# Patient Record
Sex: MALE | Race: BLACK OR AFRICAN AMERICAN | NOT HISPANIC OR LATINO | URBAN - METROPOLITAN AREA
[De-identification: names, ages, dates, MRNs, and addresses within clinical notes are randomized per-mention and may not be internally consistent; named-entity substitution may affect disease eponyms.]

---

## 2019-05-08 ENCOUNTER — INPATIENT (INPATIENT)
Facility: HOSPITAL | Age: 54
LOS: 22 days | Discharge: LEFT BEFORE DISCHARGE | End: 2019-05-31
Attending: INTERNAL MEDICINE | Admitting: INTERNAL MEDICINE
Payer: MEDICAID

## 2019-05-08 VITALS
RESPIRATION RATE: 18 BRPM | SYSTOLIC BLOOD PRESSURE: 116 MMHG | DIASTOLIC BLOOD PRESSURE: 50 MMHG | WEIGHT: 225.09 LBS | HEIGHT: 73 IN | HEART RATE: 106 BPM

## 2019-05-08 DIAGNOSIS — D68.4 ACQUIRED COAGULATION FACTOR DEFICIENCY: ICD-10-CM

## 2019-05-08 DIAGNOSIS — D69.59 OTHER SECONDARY THROMBOCYTOPENIA: ICD-10-CM
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
## 2019-05-08 DIAGNOSIS — R09.89 OTHER SPECIFIED SYMPTOMS AND SIGNS INVOLVING THE CIRCULATORY AND RESPIRATORY SYSTEMS: ICD-10-CM

## 2019-05-08 DIAGNOSIS — G31.2 DEGENERATION OF NERVOUS SYSTEM DUE TO ALCOHOL: ICD-10-CM
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
## 2019-05-08 LAB
ABO RH CONFIRMATION: SIGNIFICANT CHANGE UP
ALBUMIN SERPL ELPH-MCNC: 1.9 G/DL — LOW (ref 3.3–5)
ALP SERPL-CCNC: 55 U/L — SIGNIFICANT CHANGE UP (ref 40–120)
ALT FLD-CCNC: 13 U/L — SIGNIFICANT CHANGE UP (ref 12–78)
AMMONIA BLD-MCNC: 36 UMOL/L — HIGH (ref 11–32)
ANION GAP SERPL CALC-SCNC: 18 MMOL/L — HIGH (ref 5–17)
ANISOCYTOSIS BLD QL: SLIGHT — SIGNIFICANT CHANGE UP
APAP SERPL-MCNC: < 2 UG/ML (ref 10–30)
APTT BLD: 31.4 SEC — SIGNIFICANT CHANGE UP (ref 28.5–37)
AST SERPL-CCNC: 77 U/L — HIGH (ref 15–37)
BASOPHILS # BLD AUTO: 0.12 K/UL — SIGNIFICANT CHANGE UP (ref 0–0.2)
BASOPHILS NFR BLD AUTO: 1 % — SIGNIFICANT CHANGE UP (ref 0–2)
BILIRUB SERPL-MCNC: 6.3 MG/DL — HIGH (ref 0.2–1.2)
BLD GP AB SCN SERPL QL: SIGNIFICANT CHANGE UP
BUN SERPL-MCNC: 25 MG/DL — HIGH (ref 7–23)
BURR CELLS BLD QL SMEAR: SIGNIFICANT CHANGE UP
CALCIUM SERPL-MCNC: 8.2 MG/DL — LOW (ref 8.5–10.1)
CHLORIDE SERPL-SCNC: 105 MMOL/L — SIGNIFICANT CHANGE UP (ref 96–108)
CK SERPL-CCNC: 56 U/L — SIGNIFICANT CHANGE UP (ref 26–308)
CO2 SERPL-SCNC: 15 MMOL/L — LOW (ref 22–31)
CREAT SERPL-MCNC: 2.57 MG/DL — HIGH (ref 0.5–1.3)
EOSINOPHIL # BLD AUTO: 0 K/UL — SIGNIFICANT CHANGE UP (ref 0–0.5)
EOSINOPHIL NFR BLD AUTO: 0 % — SIGNIFICANT CHANGE UP (ref 0–6)
ETHANOL SERPL-MCNC: <10 MG/DL — SIGNIFICANT CHANGE UP (ref 0–10)
GLUCOSE SERPL-MCNC: 118 MG/DL — HIGH (ref 70–99)
HCT VFR BLD CALC: 10.1 % — CRITICAL LOW (ref 39–50)
HGB BLD-MCNC: 2.9 G/DL — CRITICAL LOW (ref 13–17)
HYPOCHROMIA BLD QL: SIGNIFICANT CHANGE UP
INR BLD: 1.97 RATIO — HIGH (ref 0.88–1.16)
LACTATE SERPL-SCNC: 8.8 MMOL/L — CRITICAL HIGH (ref 0.7–2)
LIDOCAIN IGE QN: 180 U/L — SIGNIFICANT CHANGE UP (ref 73–393)
LYMPHOCYTES # BLD AUTO: 2.97 K/UL — SIGNIFICANT CHANGE UP (ref 1–3.3)
LYMPHOCYTES # BLD AUTO: 24 % — SIGNIFICANT CHANGE UP (ref 13–44)
MACROCYTES BLD QL: SIGNIFICANT CHANGE UP
MANUAL SMEAR VERIFICATION: SIGNIFICANT CHANGE UP
MCHC RBC-ENTMCNC: 28.7 GM/DL — LOW (ref 32–36)
MCHC RBC-ENTMCNC: 36.3 PG — HIGH (ref 27–34)
MCV RBC AUTO: 126.3 FL — HIGH (ref 80–100)
MICROCYTES BLD QL: SLIGHT — SIGNIFICANT CHANGE UP
MONOCYTES # BLD AUTO: 0.25 K/UL — SIGNIFICANT CHANGE UP (ref 0–0.9)
MONOCYTES NFR BLD AUTO: 2 % — SIGNIFICANT CHANGE UP (ref 2–14)
MYELOCYTES NFR BLD: 1 % — HIGH (ref 0–0)
NEUTROPHILS # BLD AUTO: 8.91 K/UL — HIGH (ref 1.8–7.4)
NEUTROPHILS NFR BLD AUTO: 72 % — SIGNIFICANT CHANGE UP (ref 43–77)
NRBC # BLD: 1 /100 — HIGH (ref 0–0)
NRBC # BLD: SIGNIFICANT CHANGE UP /100 WBCS (ref 0–0)
PLAT MORPH BLD: NORMAL — SIGNIFICANT CHANGE UP
PLATELET # BLD AUTO: 115 K/UL — LOW (ref 150–400)
POLYCHROMASIA BLD QL SMEAR: SLIGHT — SIGNIFICANT CHANGE UP
POTASSIUM SERPL-MCNC: 3.8 MMOL/L — SIGNIFICANT CHANGE UP (ref 3.5–5.3)
POTASSIUM SERPL-SCNC: 3.8 MMOL/L — SIGNIFICANT CHANGE UP (ref 3.5–5.3)
PROT SERPL-MCNC: 7.4 GM/DL — SIGNIFICANT CHANGE UP (ref 6–8.3)
PROTHROM AB SERPL-ACNC: 22.5 SEC — HIGH (ref 10–12.9)
RBC # BLD: 0.8 M/UL — LOW (ref 4.2–5.8)
RBC # FLD: 20.3 % — HIGH (ref 10.3–14.5)
RBC BLD AUTO: ABNORMAL
SALICYLATES SERPL-MCNC: <1.7 MG/DL (ref 2.8–20)
SODIUM SERPL-SCNC: 138 MMOL/L — SIGNIFICANT CHANGE UP (ref 135–145)
TROPONIN I SERPL-MCNC: <.015 NG/ML — SIGNIFICANT CHANGE UP (ref 0.01–0.04)
WBC # BLD: 12.38 K/UL — HIGH (ref 3.8–10.5)
WBC # FLD AUTO: 12.38 K/UL — HIGH (ref 3.8–10.5)

## 2019-05-08 PROCEDURE — 93010 ELECTROCARDIOGRAM REPORT: CPT

## 2019-05-08 PROCEDURE — 71045 X-RAY EXAM CHEST 1 VIEW: CPT | Mod: 26

## 2019-05-08 PROCEDURE — 99291 CRITICAL CARE FIRST HOUR: CPT

## 2019-05-08 RX ORDER — VANCOMYCIN HCL 1 G
1000 VIAL (EA) INTRAVENOUS ONCE
Qty: 0 | Refills: 0 | Status: COMPLETED | OUTPATIENT
Start: 2019-05-08 | End: 2019-05-08

## 2019-05-08 RX ORDER — SODIUM CHLORIDE 9 MG/ML
1000 INJECTION INTRAMUSCULAR; INTRAVENOUS; SUBCUTANEOUS ONCE
Qty: 0 | Refills: 0 | Status: COMPLETED | OUTPATIENT
Start: 2019-05-08 | End: 2019-05-08

## 2019-05-08 RX ORDER — CEFTRIAXONE 500 MG/1
1 INJECTION, POWDER, FOR SOLUTION INTRAMUSCULAR; INTRAVENOUS ONCE
Qty: 0 | Refills: 0 | Status: COMPLETED | OUTPATIENT
Start: 2019-05-08 | End: 2019-05-08

## 2019-05-08 RX ADMIN — Medication 250 MILLIGRAM(S): at 22:14

## 2019-05-08 RX ADMIN — CEFTRIAXONE 100 GRAM(S): 500 INJECTION, POWDER, FOR SOLUTION INTRAMUSCULAR; INTRAVENOUS at 21:54

## 2019-05-08 RX ADMIN — SODIUM CHLORIDE 500 MILLILITER(S): 9 INJECTION INTRAMUSCULAR; INTRAVENOUS; SUBCUTANEOUS at 21:54

## 2019-05-08 RX ADMIN — CEFTRIAXONE 1 GRAM(S): 500 INJECTION, POWDER, FOR SOLUTION INTRAMUSCULAR; INTRAVENOUS at 22:15

## 2019-05-08 NOTE — ED PROVIDER NOTE - CARE PLAN
Principal Discharge DX:	Anemia, unspecified type  Secondary Diagnosis:	Acute renal failure, unspecified acute renal failure type

## 2019-05-08 NOTE — H&P ADULT - ATTENDING COMMENTS
54 year old M HTN and ETOH abuse brought in by wife for generalized weakness and dark urine found to have lactic acidosis with h/h 2.9/10.1 with Tbili 6.3, possibly hemolytic anemia vs nutritional deficiency as  in the setting of ETOH abuse.    GEN - NAD; well appearing; A+O x3; non-toxic appearing, diffusely jaundiced; HEENT: noted to have multiple teeth missing, no palatal petechiae  CARD -s1s2, RRR, no M,G,R; PULM - CTA b/l, symmetric breath sounds; ABD:  +BS, ND, NT, soft, no guarding, no rebound, no masses;   BACK: no CVA tenderness, Normal  spine; EXT: symmetric pulses, 2+ dp, capillary refill < 2 seconds, no clubbing, no cyanosis, no edema   NEURO: alert, CN 2-12 intact, reflexes nl, sensation nl, coordination nl, finger to nose nl, romberg negative, motor 5/5 RUE/LUE/RLE/LLE/EHL/Plantar flexion, no pronator drift, gait nl; slight upper extremity tremor; SKIN: Bruise to right anterior chest and left posterior thigh and calf.    Plan  Neuro: No acute neuro deficits, noted to have slight tremor, concern for possible ETOH withdrawal given history of abuse.  Will start CIWA, with trigger dose ativan.  Monitor for signs of withdrawal  CV: Grossly hemodynamically stable, will check trops, ck and BNP to evaluate for signs of heart failure, possibly high output given anemia  Pulm: No acute issues  GI: Possible gastritis, will start protonix, concern for possible cirrhosis, abdomen slightly distended, soft, possible ascites, will check hepatitis panel.  Noted to have MELD score of 30, however given unknown etiology of bili, could be related to hemolysis rather than hepatic dysfunction.  Follow up CT for acute abnormalities  Renal/Metabolic: RICHAR on ?CKD unknown, last seen by PMD in 9/2018 according to wife. Will trend creatinine closely.  Will obtain nephrology consult in AM.  ID: Afebrile, with lactic acidosis, unclear if sepsis related or related to hypoperfusion 2/2 anemia.  Received ceftriaxone and vancomycin empirically in the ED.  Will continue to monitor, trend lactate after 1st PRBC  Heme: Anemia likely chronic, unclear etiology noted to be macrocytic, with associated protein gap of 5.5.  No schistocytes noted on differential, awaiting for official blood smear, to rule out TTP given anemia, acute renal failure, with mild thrombocytopenia.  Follow up LDH, haptoglobin, iron studies, ferritin.  Heme consult in AM.  No petechiae noted, but bruising noted over chest and posterior lower extremity.  Follow up lower extremity US of LLE.    Endo: Check TSH  Dispo: Admit to ICU for transfusion, fluid management.    Attending Critical Care Time Spent: 60 Minutes 54 year old M HTN and ETOH abuse brought in by wife for generalized weakness and dark urine found to have lactic acidosis with h/h 2.9/10.1 with Tbili 6.3, possibly hemolytic anemia vs nutritional deficiency as  in the setting of ETOH abuse.    GEN - NAD; well appearing; A+O x3; non-toxic appearing, diffusely jaundiced; HEENT: noted to have multiple teeth missing, no palatal petechiae  CARD -s1s2, RRR, no M,G,R; PULM - CTA b/l, symmetric breath sounds; ABD:  +BS, soft distended with fluid wave, no guarding, no rebound, no masses; BACK: no CVA tenderness, Normal  spine; EXT: symmetric pulses, 2+ dp, capillary refill < 2 seconds, no clubbing, no cyanosis, no edema   NEURO: alert, CN 2-12 intact, reflexes nl, sensation nl, coordination nl, finger to nose nl, romberg negative, motor 5/5 RUE/LUE/RLE/LLE/EHL/Plantar flexion, no pronator drift, gait nl; slight upper extremity tremor; SKIN: Bruise to right anterior chest and left posterior thigh and calf.    Plan  Neuro: No acute neuro deficits, noted to have slight tremor, concern for possible ETOH withdrawal given history of abuse.  Will start CIWA, with trigger dose ativan.  Monitor for signs of withdrawal  CV: Grossly hemodynamically stable, will check trops, ck and BNP to evaluate for signs of heart failure, possibly high output given anemia  Pulm: No acute issues  GI: Possible gastritis, will start protonix, concern for possible cirrhosis, abdomen slightly distended, soft, possible ascites, will check hepatitis panel.  Noted to have MELD score of 30, however given unknown etiology of bili, could be related to hemolysis rather than hepatic dysfunction.  Follow up CT for acute abnormalities  Renal/Metabolic: RICHAR on ?CKD unknown, last seen by PMD in 9/2018 according to wife. Will trend creatinine closely.  Will obtain nephrology consult in AM.  ID: Afebrile, with lactic acidosis, unclear if sepsis related or related to hypoperfusion 2/2 anemia.  Received ceftriaxone and vancomycin empirically in the ED.  Will continue to monitor, trend lactate after 1st PRBC  Heme: Anemia likely chronic, unclear etiology noted to be macrocytic, with associated protein gap of 5.5.  No schistocytes noted on differential, awaiting for official blood smear, to rule out TTP given anemia, acute renal failure, with mild thrombocytopenia.  Follow up LDH, haptoglobin, iron studies, ferritin.  Heme consult in AM.  No petechiae noted, but bruising noted over chest and posterior lower extremity.  Follow up lower extremity US of LLE.    Endo: Check TSH  Dispo: Admit to ICU for transfusion, fluid management.    Attending Critical Care Time Spent: 60 Minutes

## 2019-05-08 NOTE — ED ADULT NURSE NOTE - NSIMPLEMENTINTERV_GEN_ALL_ED
Implemented All Universal Safety Interventions:  [place] to call system. Call bell, personal items and telephone within reach. Instruct patient to call for assistance. Room bathroom lighting operational. Non-slip footwear when patient is off stretcher. Physically safe environment: no spills, clutter or unnecessary equipment. Stretcher in lowest position, wheels locked, appropriate side rails in place.

## 2019-05-08 NOTE — ED CLERICAL - BED REQUESTED
08-May-2019 23:56 Crisis worker spoke to pt about placement and transfer time      Kaera Wilkins  08/12/18 [id number]

## 2019-05-08 NOTE — ED ADULT NURSE NOTE - OBJECTIVE STATEMENT
54M aaox3 ambulatory with assistance,  bibems from home activated by wife secondary to generalized weakness, inabuility to ambulate steadily and tremors. Patient with h/o HTN, and alcohol abuse. Notes paleness all over the body, bruises on the rt shoulder, rt chest wall and left thigh, denies any trauma or fall. Last drink was few days ago, denies any h/o alcohol withdrawal. 2 large bore IV access inserted, labss ent pending reslts.

## 2019-05-08 NOTE — H&P ADULT - NSHPREVIEWOFSYSTEMS_GEN_ALL_CORE
REVIEW OF SYSTEMS:    CONSTITUTIONAL:+ fatigue, weakness  EYES: No eye pain, visual disturbances, or discharge  ENMT:  No difficulty hearing, tinnitus, vertigo; No sinus or throat pain  NECK: No pain or stiffness  BREASTS: No pain, masses, or nipple discharge  RESPIRATORY: No cough, wheezing, chills or hemoptysis; No shortness of breath  CARDIOVASCULAR: No chest pain, palpitations, dizziness, or leg swelling  GASTROINTESTINAL: No abdominal or epigastric pain. No nausea, vomiting, or hematemesis; No diarrhea or constipation. No melena or hematochezia.  GENITOURINARY: No dysuria, frequency, hematuria, or incontinence  NEUROLOGICAL: No headaches, memory loss, loss of strength, numbness, or tremors  SKIN: No itching, burning, rashes, or lesions   LYMPH NODES: No enlarged glands  ENDOCRINE: No heat or cold intolerance; No hair loss  MUSCULOSKELETAL: +left lower extremity pain  PSYCHIATRIC: No depression, anxiety, mood swings, or difficulty sleeping  HEME/LYMPH: + easy bruising, + bleeding gums  ALLERGY AND IMMUNOLOGIC: No hives or eczema REVIEW OF SYSTEMS:    CONSTITUTIONAL:+ fatigue, weakness  EYES: No eye pain, visual disturbances, or discharge  ENMT:  No difficulty hearing, tinnitus, vertigo; No sinus or throat pain  NECK: No pain or stiffness  BREASTS: No pain, masses, or nipple discharge  RESPIRATORY: No cough, wheezing, chills or hemoptysis; No shortness of breath  CARDIOVASCULAR: No chest pain, palpitations, dizziness, or leg swelling  GASTROINTESTINAL: No abdominal or epigastric pain. No nausea, vomiting, or hematemesis; No diarrhea or constipation. No melena or hematochezia.  GENITOURINARY: No dysuria, frequency, hematuria, or incontinence  NEUROLOGICAL: No headaches, memory loss, loss of strength, numbness, or tremors  SKIN: + Jaundice; + bruisingNo itching, burning, rashes, or lesions   LYMPH NODES: No enlarged glands  ENDOCRINE: No heat or cold intolerance; No hair loss  MUSCULOSKELETAL: +left lower extremity pain  PSYCHIATRIC: No depression, anxiety, mood swings, or difficulty sleeping  HEME/LYMPH: + easy bruising, + bleeding gums  ALLERGY AND IMMUNOLOGIC: No hives or eczema

## 2019-05-08 NOTE — ED PROVIDER NOTE - CONSTITUTIONAL, MLM
normal... jaundice, awake, alert, oriented to person, place, time/situation and in no apparent distress.

## 2019-05-08 NOTE — H&P ADULT - HISTORY OF PRESENT ILLNESS
Patient is a 53 yo m with pmh of alcohol abuse and HTN BIB wife for generalized weakness for the last week associated with dark urine today. Patient reports nbnb vomitus last week and states his last drink (alcohol) was Friday. He also c/o bilateral leg swelling and easy bruising. No aggravating or relieving factors 54 year old M HTN and ETOH abuse brought in by wife for generalized weakness and dark urine.  Wife reports patient has been having non-bloody non-bilious vomiting last week which stopped his drinking on Friday 5/3.  Wife also notes he has been having easy bruising and L leg swelling over the same amount of time.  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  Wife subsequently added that he has been having weakness and fatigue since early March with episodes of gum bleeding going back to early January.  Of note, wife noted that his skin color has changed over the past week.  Pt denies fevers, chills, eye pain vision changes,

## 2019-05-08 NOTE — ED PROVIDER NOTE - OBJECTIVE STATEMENT
Pertinent PMH/PSH/FHx/SHx and Review of Systems contained within:  55 yo m with pmh of alcohol abuse and htn BIB wife for generalized weakness for the last week associated with dark urine today. patient reports nbnb vomitus last week and states his last drink was friday. he also c/o bilateral leg swelling and easy bruising. No aggravating or relieving factors, No fever/chills, No photophobia/eye pain/changes in vision, No ear pain/sore throat/dysphagia, No chest pain/palpitations, no SOB/cough/wheeze/stridor, No abdominal pain, No D, no dysuria/frequency/discharge, No neck/back pain, no rash, no changes in neurological status/function.

## 2019-05-08 NOTE — H&P ADULT - ASSESSMENT
54 year old M HTN and ETOH abuse brought in by wife for generalized weakness and dark urine found to have lactic acidosis with h/h 2.9/10.1 with Tbili 6.3, possibly hemolytic anemia vs nutritional deficiency as  in the setting of ETOH abuse.      Neuro: No acute neuro deficits, noted to have slight tremor, concern for possible ETOH withdrawal given history of abuse.  Will start CIWA, with trigger dose ativan.  Monitor for signs of withdrawal  CV: Grossly hemodynamically stable, will check trops, ck and BNP to evaluate for signs of heart failure, possibly high output given anemia  Pulm: No acute issues  GI: Possible gastritis, will start protonix, concern for possible cirrhosis, abdomen slightly distended, soft, possible ascites, will check hepatitis panel.  Noted to have MELD score of 30, however given unknown etiology of bili, could be related to hemolysis rather than hepatic dysfunction.  Follow up CT for acute abnormalities  Renal/Metabolic: RICHAR on ?CKD unknown, last seen by PMD in 9/2018 according to wife. Will trend creatinine closely.  Will obtain nephrology consult in AM.  ID: Afebrile, with lactic acidosis, unclear if sepsis related or related to hypoperfusion 2/2 anemia.  Received ceftriaxone and vancomycin empirically in the ED.  Will continue to monitor, trend lactate after 1st PRBC  Heme: Anemia likely chronic, unclear etiology noted to be macrocytic, with associated protein gap of 5.5.  No schistocytes noted on differential, awaiting for official blood smear, to rule out TTP given anemia, acute renal failure, with mild thrombocytopenia.  Follow up LDH, haptoglobin, iron studies, ferritin.  Heme consult in AM.  No petechiae noted, but bruising noted over chest and posterior lower extremity.  Follow up lower extremity US of LLE.    Endo: Check TSH  Dispo: Admit to ICU for transfusion, fluid management.    Attending Critical Care Time Spent: 60 Minutes

## 2019-05-08 NOTE — H&P ADULT - NSHPPHYSICALEXAM_GEN_ALL_CORE
GEN - NAD; well appearing; A+O x3; non-toxic appearing, diffusely jaundiced  HEENT: noted to have multiple teeth missing, no palatal petechiae  CARD -s1s2, RRR, no M,G,R;   PULM - CTA b/l, symmetric breath sounds;   ABD:  +BS, ND, NT, soft, no guarding, no rebound, no masses;   BACK: no CVA tenderness, Normal  spine;   EXT: symmetric pulses, 2+ dp, capillary refill < 2 seconds, no clubbing, no cyanosis, no edema   NEURO: alert, CN 2-12 intact, reflexes nl, sensation nl, coordination nl, finger to nose nl, romberg negative, motor 5/5 RUE/LUE/RLE/LLE/EHL/Plantar flexion, no pronator drift, gait nl; slight upper extremity tremor  SKIN: Bruise to right anterior chest and left posterior thigh and calf. GEN - NAD; well appearing; A+O x3; non-toxic appearing, diffusely jaundiced  HEENT: noted to have multiple teeth missing, no palatal petechiae  CARD -s1s2, RRR, no M,G,R;   PULM - CTA b/l, symmetric breath sounds;   ABD:  +BS, NT, distended with fluid wave; soft, no guarding, no rebound, no masses;   BACK: no CVA tenderness, Normal  spine;   EXT: symmetric pulses, 2+ dp, capillary refill < 2 seconds, no clubbing, no cyanosis, no edema   NEURO: alert, CN 2-12 intact, reflexes nl, sensation nl, coordination nl, finger to nose nl, romberg negative, motor 5/5 RUE/LUE/RLE/LLE/EHL/Plantar flexion, no pronator drift, gait nl; slight upper extremity tremor  SKIN: Bruise to right anterior chest and left posterior thigh and calf.

## 2019-05-08 NOTE — ED PROVIDER NOTE - CRITICAL CARE PROVIDED
interpretation of diagnostic studies/direct patient care (not related to procedure)/documentation/additional history taking/consult w/ pt's family directly relating to pts condition/consultation with other physicians

## 2019-05-08 NOTE — ED ADULT NURSE REASSESSMENT NOTE - NS ED NURSE REASSESS COMMENT FT1
Patient started on first unit PRBC transfusion as ordered. Seen and evaluated by MICU. VS wnl, will continue to monitor.

## 2019-05-08 NOTE — H&P ADULT - NSHPLABSRESULTS_GEN_ALL_CORE
.  LABS:                         2.9    12.38 )-----------( 115      ( 08 May 2019 21:32 )             10.1     05-08    138  |  105  |  25<H>  ----------------------------<  118<H>  3.8   |  15<L>  |  2.57<H>    Ca    8.2<L>      08 May 2019 21:32    TPro  7.4  /  Alb  1.9<L>  /  TBili  6.3<H>  /  DBili  x   /  AST  77<H>  /  ALT  13  /  AlkPhos  55  05-08    PT/INR - ( 08 May 2019 21:32 )   PT: 22.5 sec;   INR: 1.97 ratio         PTT - ( 08 May 2019 21:32 )  PTT:31.4 sec      Lactate, Blood: 8.8 mmol/L (05-08 @ 21:32)      RADIOLOGY, EKG & ADDITIONAL TESTS: Reviewed. .  LABS:                         2.9    12.38 )-----------( 115      ( 08 May 2019 21:32 )             10.1     05-08  138  |  105  |  25<H>  ----------------------------<  118<H>  3.8   |  15<L>  |  2.57<H>    Ca    8.2<L>      08 May 2019 21:32    TPro  7.4  /  Alb  1.9<L>  /  TBili  6.3<H>  /  DBili  x   /  AST  77<H>  /  ALT  13  /  AlkPhos  55  05-08    PT/INR - ( 08 May 2019 21:32 )   PT: 22.5 sec;   INR: 1.97 ratio       PTT - ( 08 May 2019 21:32 )  PTT:31.4 sec    Lactate, Blood: 8.8 mmol/L (05-08 @ 21:32)    RADIOLOGY, EKG & ADDITIONAL TESTS: Reviewed.

## 2019-05-08 NOTE — ED ADULT TRIAGE NOTE - CHIEF COMPLAINT QUOTE
Pt BIBA for generalized weakness and muscle cramping x 3 days. Of note, pt appears jaundiced and has a large abd and dark urine. Used to drink EtOH excessively, last drank three days ago. No known liver hx. Denies pain. Pt is slow to answer questions.

## 2019-05-09 LAB
% ALBUMIN: 33.6 % — SIGNIFICANT CHANGE UP
% ALPHA 1: 3.3 % — SIGNIFICANT CHANGE UP
% ALPHA 2: 3.6 % — SIGNIFICANT CHANGE UP
% BETA: 35.6 % — SIGNIFICANT CHANGE UP
% GAMMA: 23.9 % — SIGNIFICANT CHANGE UP
% M SPIKE: SIGNIFICANT CHANGE UP
ALBUMIN SERPL ELPH-MCNC: 1.6 G/DL — LOW (ref 3.3–5)
ALBUMIN SERPL ELPH-MCNC: 2.1 G/DL — LOW (ref 3.6–5.5)
ALBUMIN/GLOB SERPL ELPH: 0.5 RATIO — SIGNIFICANT CHANGE UP
ALP SERPL-CCNC: 42 U/L — SIGNIFICANT CHANGE UP (ref 40–120)
ALPHA1 GLOB SERPL ELPH-MCNC: 0.2 G/DL — SIGNIFICANT CHANGE UP (ref 0.1–0.4)
ALPHA2 GLOB SERPL ELPH-MCNC: 0.2 G/DL — LOW (ref 0.5–1)
ALT FLD-CCNC: 13 U/L — SIGNIFICANT CHANGE UP (ref 12–78)
AMPHET UR-MCNC: NEGATIVE — SIGNIFICANT CHANGE UP
ANION GAP SERPL CALC-SCNC: 9 MMOL/L — SIGNIFICANT CHANGE UP (ref 5–17)
APPEARANCE UR: CLEAR — SIGNIFICANT CHANGE UP
APTT BLD: 32 SEC — SIGNIFICANT CHANGE UP (ref 28.5–37)
AST SERPL-CCNC: 65 U/L — HIGH (ref 15–37)
B-GLOBULIN SERPL ELPH-MCNC: 2.2 G/DL — HIGH (ref 0.5–1)
BACTERIA # UR AUTO: ABNORMAL
BARBITURATES UR SCN-MCNC: NEGATIVE — SIGNIFICANT CHANGE UP
BASOPHILS # BLD AUTO: 0 K/UL — SIGNIFICANT CHANGE UP (ref 0–0.2)
BASOPHILS # BLD AUTO: 0 K/UL — SIGNIFICANT CHANGE UP (ref 0–0.2)
BASOPHILS NFR BLD AUTO: 0 % — SIGNIFICANT CHANGE UP (ref 0–2)
BASOPHILS NFR BLD AUTO: 0 % — SIGNIFICANT CHANGE UP (ref 0–2)
BENZODIAZ UR-MCNC: NEGATIVE — SIGNIFICANT CHANGE UP
BILIRUB SERPL-MCNC: 5.8 MG/DL — HIGH (ref 0.2–1.2)
BILIRUB UR-MCNC: ABNORMAL
BUN SERPL-MCNC: 28 MG/DL — HIGH (ref 7–23)
CALCIUM SERPL-MCNC: 7.6 MG/DL — LOW (ref 8.5–10.1)
CHLORIDE SERPL-SCNC: 107 MMOL/L — SIGNIFICANT CHANGE UP (ref 96–108)
CK MB BLD-MCNC: 2.9 % — SIGNIFICANT CHANGE UP (ref 0–3.5)
CK MB CFR SERPL CALC: 1.8 NG/ML — SIGNIFICANT CHANGE UP (ref 0.5–3.6)
CK SERPL-CCNC: 63 U/L — SIGNIFICANT CHANGE UP (ref 26–308)
CO2 SERPL-SCNC: 21 MMOL/L — LOW (ref 22–31)
COCAINE METAB.OTHER UR-MCNC: NEGATIVE — SIGNIFICANT CHANGE UP
COLOR SPEC: YELLOW — SIGNIFICANT CHANGE UP
CREAT SERPL-MCNC: 2.34 MG/DL — HIGH (ref 0.5–1.3)
DIFF PNL FLD: ABNORMAL
EOSINOPHIL # BLD AUTO: 0.01 K/UL — SIGNIFICANT CHANGE UP (ref 0–0.5)
EOSINOPHIL # BLD AUTO: 0.02 K/UL — SIGNIFICANT CHANGE UP (ref 0–0.5)
EOSINOPHIL NFR BLD AUTO: 0.1 % — SIGNIFICANT CHANGE UP (ref 0–6)
EOSINOPHIL NFR BLD AUTO: 0.2 % — SIGNIFICANT CHANGE UP (ref 0–6)
EPI CELLS # UR: ABNORMAL
FERRITIN SERPL-MCNC: 112 NG/ML — SIGNIFICANT CHANGE UP (ref 30–400)
FOLATE SERPL-MCNC: 4.7 NG/ML — SIGNIFICANT CHANGE UP
GAMMA GLOBULIN: 1.5 G/DL — SIGNIFICANT CHANGE UP (ref 0.6–1.6)
GLUCOSE SERPL-MCNC: 118 MG/DL — HIGH (ref 70–99)
GLUCOSE UR QL: NEGATIVE MG/DL — SIGNIFICANT CHANGE UP
GRAN CASTS # UR COMP ASSIST: ABNORMAL /LPF
HAPTOGLOB SERPL-MCNC: <20 MG/DL — LOW (ref 34–200)
HAV IGM SER-ACNC: SIGNIFICANT CHANGE UP
HAV IGM SER-ACNC: SIGNIFICANT CHANGE UP
HBV CORE IGM SER-ACNC: SIGNIFICANT CHANGE UP
HBV CORE IGM SER-ACNC: SIGNIFICANT CHANGE UP
HBV SURFACE AG SER-ACNC: SIGNIFICANT CHANGE UP
HBV SURFACE AG SER-ACNC: SIGNIFICANT CHANGE UP
HCT VFR BLD CALC: 13.4 % — CRITICAL LOW (ref 39–50)
HCT VFR BLD CALC: 18.5 % — CRITICAL LOW (ref 39–50)
HCT VFR BLD CALC: 8.6 % — CRITICAL LOW (ref 39–50)
HCV AB S/CO SERPL IA: 0.19 S/CO — SIGNIFICANT CHANGE UP (ref 0–0.99)
HCV AB S/CO SERPL IA: 0.25 S/CO — SIGNIFICANT CHANGE UP (ref 0–0.99)
HCV AB SERPL-IMP: SIGNIFICANT CHANGE UP
HCV AB SERPL-IMP: SIGNIFICANT CHANGE UP
HGB BLD-MCNC: 2.4 G/DL — CRITICAL LOW (ref 13–17)
HGB BLD-MCNC: 4.3 G/DL — CRITICAL LOW (ref 13–17)
HGB BLD-MCNC: 6.3 G/DL — CRITICAL LOW (ref 13–17)
HIV 1+2 AB+HIV1 P24 AG SERPL QL IA: SIGNIFICANT CHANGE UP
HYALINE CASTS # UR AUTO: ABNORMAL /LPF
IMM GRANULOCYTES NFR BLD AUTO: 1.3 % — SIGNIFICANT CHANGE UP (ref 0–1.5)
IMM GRANULOCYTES NFR BLD AUTO: 1.8 % — HIGH (ref 0–1.5)
INR BLD: 2.15 RATIO — HIGH (ref 0.88–1.16)
IRON SATN MFR SERPL: 16 % — SIGNIFICANT CHANGE UP (ref 16–55)
IRON SATN MFR SERPL: 20 UG/DL — LOW (ref 45–165)
KETONES UR-MCNC: ABNORMAL
LACTATE SERPL-SCNC: 2.3 MMOL/L — HIGH (ref 0.7–2)
LDH SERPL L TO P-CCNC: 363 U/L — HIGH (ref 50–242)
LEUKOCYTE ESTERASE UR-ACNC: ABNORMAL
LYMPHOCYTES # BLD AUTO: 1.86 K/UL — SIGNIFICANT CHANGE UP (ref 1–3.3)
LYMPHOCYTES # BLD AUTO: 1.89 K/UL — SIGNIFICANT CHANGE UP (ref 1–3.3)
LYMPHOCYTES # BLD AUTO: 16.7 % — SIGNIFICANT CHANGE UP (ref 13–44)
LYMPHOCYTES # BLD AUTO: 21.3 % — SIGNIFICANT CHANGE UP (ref 13–44)
M-SPIKE: SIGNIFICANT CHANGE UP (ref 0–0)
MAGNESIUM SERPL-MCNC: 1.7 MG/DL — SIGNIFICANT CHANGE UP (ref 1.6–2.6)
MCHC RBC-ENTMCNC: 27.9 GM/DL — LOW (ref 32–36)
MCHC RBC-ENTMCNC: 32.1 GM/DL — SIGNIFICANT CHANGE UP (ref 32–36)
MCHC RBC-ENTMCNC: 33.3 PG — SIGNIFICANT CHANGE UP (ref 27–34)
MCHC RBC-ENTMCNC: 34.1 GM/DL — SIGNIFICANT CHANGE UP (ref 32–36)
MCHC RBC-ENTMCNC: 34.1 PG — HIGH (ref 27–34)
MCHC RBC-ENTMCNC: 35.3 PG — HIGH (ref 27–34)
MCV RBC AUTO: 106.3 FL — HIGH (ref 80–100)
MCV RBC AUTO: 126.5 FL — HIGH (ref 80–100)
MCV RBC AUTO: 97.9 FL — SIGNIFICANT CHANGE UP (ref 80–100)
METHADONE UR-MCNC: NEGATIVE — SIGNIFICANT CHANGE UP
MONOCYTES # BLD AUTO: 1.51 K/UL — HIGH (ref 0–0.9)
MONOCYTES # BLD AUTO: 1.73 K/UL — HIGH (ref 0–0.9)
MONOCYTES NFR BLD AUTO: 15.5 % — HIGH (ref 2–14)
MONOCYTES NFR BLD AUTO: 17 % — HIGH (ref 2–14)
NEUTROPHILS # BLD AUTO: 5.35 K/UL — SIGNIFICANT CHANGE UP (ref 1.8–7.4)
NEUTROPHILS # BLD AUTO: 7.36 K/UL — SIGNIFICANT CHANGE UP (ref 1.8–7.4)
NEUTROPHILS NFR BLD AUTO: 60.2 % — SIGNIFICANT CHANGE UP (ref 43–77)
NEUTROPHILS NFR BLD AUTO: 65.9 % — SIGNIFICANT CHANGE UP (ref 43–77)
NITRITE UR-MCNC: NEGATIVE — SIGNIFICANT CHANGE UP
NRBC # BLD: 5 /100 WBCS — HIGH (ref 0–0)
NRBC # BLD: 6 /100 WBCS — HIGH (ref 0–0)
NRBC # BLD: 9 /100 WBCS — HIGH (ref 0–0)
NT-PROBNP SERPL-SCNC: 1035 PG/ML — HIGH (ref 0–125)
OPIATES UR-MCNC: NEGATIVE — SIGNIFICANT CHANGE UP
PCP SPEC-MCNC: SIGNIFICANT CHANGE UP
PCP UR-MCNC: NEGATIVE — SIGNIFICANT CHANGE UP
PH UR: 5 — SIGNIFICANT CHANGE UP (ref 5–8)
PHOSPHATE SERPL-MCNC: 3.4 MG/DL — SIGNIFICANT CHANGE UP (ref 2.5–4.5)
PLATELET # BLD AUTO: 77 K/UL — LOW (ref 150–400)
PLATELET # BLD AUTO: 88 K/UL — LOW (ref 150–400)
PLATELET # BLD AUTO: 89 K/UL — LOW (ref 150–400)
POTASSIUM SERPL-MCNC: 4.2 MMOL/L — SIGNIFICANT CHANGE UP (ref 3.5–5.3)
POTASSIUM SERPL-SCNC: 4.2 MMOL/L — SIGNIFICANT CHANGE UP (ref 3.5–5.3)
PROT PATTERN SERPL ELPH-IMP: SIGNIFICANT CHANGE UP
PROT SERPL-MCNC: 6.2 G/DL — SIGNIFICANT CHANGE UP (ref 6–8.3)
PROT SERPL-MCNC: 6.2 G/DL — SIGNIFICANT CHANGE UP (ref 6–8.3)
PROT SERPL-MCNC: 6.4 GM/DL — SIGNIFICANT CHANGE UP (ref 6–8.3)
PROT UR-MCNC: 15 MG/DL
PROTHROM AB SERPL-ACNC: 24.7 SEC — HIGH (ref 10–12.9)
RBC # BLD: 0.68 M/UL — LOW (ref 4.2–5.8)
RBC # BLD: 0.68 M/UL — LOW (ref 4.2–5.8)
RBC # BLD: 1.26 M/UL — LOW (ref 4.2–5.8)
RBC # BLD: 1.89 M/UL — LOW (ref 4.2–5.8)
RBC # FLD: 19.9 % — HIGH (ref 10.3–14.5)
RBC # FLD: 21.5 % — HIGH (ref 10.3–14.5)
RBC # FLD: 21.9 % — HIGH (ref 10.3–14.5)
RBC CASTS # UR COMP ASSIST: SIGNIFICANT CHANGE UP /HPF (ref 0–4)
RETICS #: 106.4 K/UL — SIGNIFICANT CHANGE UP (ref 25–125)
RETICS/RBC NFR: 15.6 % — HIGH (ref 0.5–2.5)
SODIUM SERPL-SCNC: 137 MMOL/L — SIGNIFICANT CHANGE UP (ref 135–145)
SP GR SPEC: 1.01 — SIGNIFICANT CHANGE UP (ref 1.01–1.02)
THC UR QL: NEGATIVE — SIGNIFICANT CHANGE UP
TIBC SERPL-MCNC: 128 UG/DL — LOW (ref 220–430)
TROPONIN I SERPL-MCNC: 0.02 NG/ML — SIGNIFICANT CHANGE UP (ref 0.01–0.04)
TSH SERPL-MCNC: 0.97 UIU/ML — SIGNIFICANT CHANGE UP (ref 0.36–3.74)
UIBC SERPL-MCNC: 108 UG/DL — LOW (ref 110–370)
UROBILINOGEN FLD QL: 8 MG/DL
VANCOMYCIN FLD-MCNC: 12.1 UG/ML — SIGNIFICANT CHANGE UP
VIT B12 SERPL-MCNC: 943 PG/ML — SIGNIFICANT CHANGE UP (ref 232–1245)
WBC # BLD: 11.16 K/UL — HIGH (ref 3.8–10.5)
WBC # BLD: 8.89 K/UL — SIGNIFICANT CHANGE UP (ref 3.8–10.5)
WBC # BLD: 9.4 K/UL — SIGNIFICANT CHANGE UP (ref 3.8–10.5)
WBC # FLD AUTO: 11.16 K/UL — HIGH (ref 3.8–10.5)
WBC # FLD AUTO: 8.89 K/UL — SIGNIFICANT CHANGE UP (ref 3.8–10.5)
WBC # FLD AUTO: 9.4 K/UL — SIGNIFICANT CHANGE UP (ref 3.8–10.5)
WBC UR QL: SIGNIFICANT CHANGE UP

## 2019-05-09 PROCEDURE — 74176 CT ABD & PELVIS W/O CONTRAST: CPT | Mod: 26

## 2019-05-09 PROCEDURE — 93970 EXTREMITY STUDY: CPT | Mod: 26

## 2019-05-09 PROCEDURE — 93306 TTE W/DOPPLER COMPLETE: CPT | Mod: 26

## 2019-05-09 PROCEDURE — 99233 SBSQ HOSP IP/OBS HIGH 50: CPT

## 2019-05-09 PROCEDURE — 76700 US EXAM ABDOM COMPLETE: CPT | Mod: 26

## 2019-05-09 PROCEDURE — 73700 CT LOWER EXTREMITY W/O DYE: CPT | Mod: 26,LT

## 2019-05-09 RX ORDER — CEFTRIAXONE 500 MG/1
1 INJECTION, POWDER, FOR SOLUTION INTRAMUSCULAR; INTRAVENOUS EVERY 24 HOURS
Refills: 0 | Status: DISCONTINUED | OUTPATIENT
Start: 2019-05-09 | End: 2019-05-10

## 2019-05-09 RX ORDER — CHLORHEXIDINE GLUCONATE 213 G/1000ML
1 SOLUTION TOPICAL
Refills: 0 | Status: DISCONTINUED | OUTPATIENT
Start: 2019-05-09 | End: 2019-05-31

## 2019-05-09 RX ORDER — FOLIC ACID 0.8 MG
1 TABLET ORAL DAILY
Refills: 0 | Status: DISCONTINUED | OUTPATIENT
Start: 2019-05-09 | End: 2019-05-31

## 2019-05-09 RX ORDER — CHLORHEXIDINE GLUCONATE 213 G/1000ML
1 SOLUTION TOPICAL DAILY
Refills: 0 | Status: DISCONTINUED | OUTPATIENT
Start: 2019-05-09 | End: 2019-05-28

## 2019-05-09 RX ORDER — THIAMINE MONONITRATE (VIT B1) 100 MG
100 TABLET ORAL DAILY
Refills: 0 | Status: COMPLETED | OUTPATIENT
Start: 2019-05-09 | End: 2019-05-11

## 2019-05-09 RX ORDER — PANTOPRAZOLE SODIUM 20 MG/1
40 TABLET, DELAYED RELEASE ORAL EVERY 12 HOURS
Refills: 0 | Status: DISCONTINUED | OUTPATIENT
Start: 2019-05-09 | End: 2019-05-15

## 2019-05-09 RX ORDER — SODIUM CHLORIDE 9 MG/ML
1000 INJECTION, SOLUTION INTRAVENOUS
Refills: 0 | Status: DISCONTINUED | OUTPATIENT
Start: 2019-05-09 | End: 2019-05-09

## 2019-05-09 RX ORDER — MAGNESIUM SULFATE 500 MG/ML
1 VIAL (ML) INJECTION ONCE
Refills: 0 | Status: COMPLETED | OUTPATIENT
Start: 2019-05-09 | End: 2019-05-09

## 2019-05-09 RX ADMIN — Medication 25 MILLIGRAM(S): at 13:28

## 2019-05-09 RX ADMIN — Medication 1 TABLET(S): at 13:28

## 2019-05-09 RX ADMIN — Medication 1 MILLIGRAM(S): at 13:28

## 2019-05-09 RX ADMIN — CEFTRIAXONE 100 GRAM(S): 500 INJECTION, POWDER, FOR SOLUTION INTRAMUSCULAR; INTRAVENOUS at 03:50

## 2019-05-09 RX ADMIN — CHLORHEXIDINE GLUCONATE 1 APPLICATION(S): 213 SOLUTION TOPICAL at 05:46

## 2019-05-09 RX ADMIN — Medication 100 MILLIGRAM(S): at 13:28

## 2019-05-09 RX ADMIN — Medication 100 GRAM(S): at 08:44

## 2019-05-09 RX ADMIN — PANTOPRAZOLE SODIUM 40 MILLIGRAM(S): 20 TABLET, DELAYED RELEASE ORAL at 05:45

## 2019-05-09 RX ADMIN — Medication 25 MILLIGRAM(S): at 17:37

## 2019-05-09 RX ADMIN — PANTOPRAZOLE SODIUM 40 MILLIGRAM(S): 20 TABLET, DELAYED RELEASE ORAL at 17:38

## 2019-05-09 NOTE — PROGRESS NOTE ADULT - SUBJECTIVE AND OBJECTIVE BOX
HPI:  Pt is 55 yo BM with h/o HTN and ETOH abuse brought in by wife for generalized weakness and dark urine; pt  found to have lactic acidosis (8.8), possible RICHAR with Hb 2.9 and repeat 2.4. In the ER pt was hemodynamically stable:      ## Labs:  CBC:                        6.3    9.40  )-----------( 89       ( 09 May 2019 18:10 )             18.5     Chem:      137  |  107  |  28<H>  ----------------------------<  118<H>  4.2   |  21<L>  |  2.34<H>    Ca    7.6<L>      09 May 2019 06:11  Phos  3.4       Mg     1.7         TPro  6.2  /  Alb  2.1<L>  /  TBili  x   /  DBili  x   /  AST  x   /  ALT  x   /  AlkPhos  x       Coags:  PT/INR - ( 09 May 2019 06:05 )   PT: 24.7 sec;   INR: 2.15 ratio         PTT - ( 09 May 2019 06:05 )  PTT:32.0 sec        ## Imaging:    ## Medications:  cefTRIAXone   IVPB 1 Gram(s) IV Intermittent every 24 hours            pantoprazole  Injectable 40 milliGRAM(s) IV Push every 12 hours    chlordiazePOXIDE 25 milliGRAM(s) Oral every 6 hours  LORazepam   Injectable 2 milliGRAM(s) IV Push every 2 hours PRN      ## Vitals:  T(C): 36.8 (19 @ 16:00), Max: 37.1 (19 @ 11:12)  HR: 90 (19 @ 18:00) (81 - 106)  BP: 128/65 (19 @ 18:00) (89/53 - 132/52)  BP(mean): 80 (19 @ 18:00) (57 - 82)  RR: 20 (19 @ 18:00) (15 - 29)  SpO2: 100% (19 @ 18:00) (91% - 100%)  Wt(kg): --  Vent:   AB-08 @ 07:01  -   @ 07:00  --------------------------------------------------------  IN: 334 mL / OUT: 200 mL / NET: 134 mL     @ 07:01   @ 19:09  --------------------------------------------------------  IN: 1308 mL / OUT: 400 mL / NET: 908 mL          ## P/E:  Gen: lying comfortably in bed in no apparent distress  Lungs: CTA  Heart: RRR  Abd: Soft/+BS  Ext: L LE edema with L thigh hematoma  Neuro: Awake/alert    CENTRAL LINE: [ ] YES [ ] NO  LOCATION:   DATE INSERTED:  REMOVE: [ ] YES [ ] NO      HANNAH: [ ] YES [ ] NO    DATE INSERTED:  REMOVE:  [ ] YES [ ] NO      A-LINE:  [ ] YES [ ] NO  LOCATION:   DATE INSERTED:  REMOVE:  [ ] YES [ ] NO  EXPLAIN:    CODE STATUS: [x ] full code  [ ] DNR  [ ] DNI  [ ] JOHANNE  Goals of care discussion: [ ] yes

## 2019-05-09 NOTE — CHART NOTE - NSCHARTNOTEFT_GEN_A_CORE
Pt is a 54yr old man with PMhx including HTN and heavy alcohol use who presented to the ER on 5/8 with chief complaint of weakness/fatigue and was found to be anemic with a hemoglobin of 2.4. Pt now s/p 4 units of packed RBC with appropriate hemoglobin increase to 6.3 without any obvious sign of active bleed. SBP in 120s, HR 80s. Discussed with Dr. Hook -- pt stable for transfer to the floor but will require a hematology consult. Endorsed to Dr. Garcia.

## 2019-05-09 NOTE — PROGRESS NOTE ADULT - ASSESSMENT
Pt is 53 yo BM with h/o HTN and ETOH abuse brought in by wife for generalized weakness and dark urine; pt  found to have lactic acidosis (8.8), possible RICHAR with Hb 2.9 and repeat 2.4. In the ER pt was hemodynamically stable    ID: If Cx remain neg, would dc Abx  CVS: Lactate has normalized  HEME: Transfuse a total of 4 units PRBC/ Will need anemia work up  FEN: Po diet/ Daily Thiamine, MVI and Folate   GI: Eventual GI work up for anemia  Renal: Follow BUN/Cr and UO  Neuro/Psych: Standing Librium with CIWA Benzo coverage  L LE: CT findings: 1. Moderate soft tissue swelling about the left thigh and calf is   nonspecific and may represent an inflammatory or infectious process in the   appropriate clinical setting.   2. Heterogeneous ovoid mass within the posterolateral musculature the   distal thigh at the level of the knee. Limited without intravenous contrast.   Differential considerations include hemorrhagic/necrotic mass, complex   hematoma, and infection.  Need to follow L LE exam  Social:  Plan discussed with the pt/ If pt has an appropriate  increase in Hb (6 range) may transfer out of the ICU

## 2019-05-10 DIAGNOSIS — D64.9 ANEMIA, UNSPECIFIED: ICD-10-CM

## 2019-05-10 LAB
ALBUMIN SERPL ELPH-MCNC: 1.6 G/DL — LOW (ref 3.3–5)
ALP SERPL-CCNC: 72 U/L — SIGNIFICANT CHANGE UP (ref 40–120)
ALT FLD-CCNC: 13 U/L — SIGNIFICANT CHANGE UP (ref 12–78)
ANION GAP SERPL CALC-SCNC: 11 MMOL/L — SIGNIFICANT CHANGE UP (ref 5–17)
AST SERPL-CCNC: 67 U/L — HIGH (ref 15–37)
BILIRUB DIRECT SERPL-MCNC: 3.17 MG/DL — HIGH (ref 0.05–0.2)
BILIRUB INDIRECT FLD-MCNC: 2.9 MG/DL — HIGH (ref 0.2–1)
BILIRUB SERPL-MCNC: 6.1 MG/DL — HIGH (ref 0.2–1.2)
BUN SERPL-MCNC: 36 MG/DL — HIGH (ref 7–23)
CALCIUM SERPL-MCNC: 7.5 MG/DL — LOW (ref 8.5–10.1)
CHLORIDE SERPL-SCNC: 108 MMOL/L — SIGNIFICANT CHANGE UP (ref 96–108)
CO2 SERPL-SCNC: 22 MMOL/L — SIGNIFICANT CHANGE UP (ref 22–31)
CREAT SERPL-MCNC: 2.45 MG/DL — HIGH (ref 0.5–1.3)
CREATININE, URINE RESULT: 298 MG/DL — SIGNIFICANT CHANGE UP
CULTURE RESULTS: NO GROWTH — SIGNIFICANT CHANGE UP
GLUCOSE SERPL-MCNC: 107 MG/DL — HIGH (ref 70–99)
HCT VFR BLD CALC: 15.1 % — CRITICAL LOW (ref 39–50)
HGB BLD-MCNC: 5.1 G/DL — CRITICAL LOW (ref 13–17)
MAGNESIUM SERPL-MCNC: 1.8 MG/DL — SIGNIFICANT CHANGE UP (ref 1.6–2.6)
MCHC RBC-ENTMCNC: 32.7 PG — SIGNIFICANT CHANGE UP (ref 27–34)
MCHC RBC-ENTMCNC: 33.8 GM/DL — SIGNIFICANT CHANGE UP (ref 32–36)
MCV RBC AUTO: 96.8 FL — SIGNIFICANT CHANGE UP (ref 80–100)
NRBC # BLD: 6 /100 WBCS — HIGH (ref 0–0)
PHOSPHATE SERPL-MCNC: 2.9 MG/DL — SIGNIFICANT CHANGE UP (ref 2.5–4.5)
PLATELET # BLD AUTO: 80 K/UL — LOW (ref 150–400)
POTASSIUM SERPL-MCNC: 3.6 MMOL/L — SIGNIFICANT CHANGE UP (ref 3.5–5.3)
POTASSIUM SERPL-SCNC: 3.6 MMOL/L — SIGNIFICANT CHANGE UP (ref 3.5–5.3)
PROT ?TM UR-MCNC: 28 MG/DL — HIGH (ref 0–12)
PROT SERPL-MCNC: 6.1 GM/DL — SIGNIFICANT CHANGE UP (ref 6–8.3)
RBC # BLD: 1.56 M/UL — LOW (ref 4.2–5.8)
RBC # FLD: 23 % — HIGH (ref 10.3–14.5)
SODIUM SERPL-SCNC: 141 MMOL/L — SIGNIFICANT CHANGE UP (ref 135–145)
SPECIMEN SOURCE: SIGNIFICANT CHANGE UP
WBC # BLD: 7.25 K/UL — SIGNIFICANT CHANGE UP (ref 3.8–10.5)
WBC # FLD AUTO: 7.25 K/UL — SIGNIFICANT CHANGE UP (ref 3.8–10.5)

## 2019-05-10 PROCEDURE — 99233 SBSQ HOSP IP/OBS HIGH 50: CPT

## 2019-05-10 RX ORDER — POLYETHYLENE GLYCOL 3350 17 G/17G
17 POWDER, FOR SOLUTION ORAL DAILY
Refills: 0 | Status: DISCONTINUED | OUTPATIENT
Start: 2019-05-10 | End: 2019-05-31

## 2019-05-10 RX ADMIN — Medication 100 MILLIGRAM(S): at 18:14

## 2019-05-10 RX ADMIN — Medication 100 MILLIGRAM(S): at 12:15

## 2019-05-10 RX ADMIN — Medication 25 MILLIGRAM(S): at 00:25

## 2019-05-10 RX ADMIN — PANTOPRAZOLE SODIUM 40 MILLIGRAM(S): 20 TABLET, DELAYED RELEASE ORAL at 06:42

## 2019-05-10 RX ADMIN — Medication 1 MILLIGRAM(S): at 12:14

## 2019-05-10 RX ADMIN — Medication 25 MILLIGRAM(S): at 12:19

## 2019-05-10 RX ADMIN — Medication 25 MILLIGRAM(S): at 06:42

## 2019-05-10 RX ADMIN — Medication 1 TABLET(S): at 12:15

## 2019-05-10 RX ADMIN — PANTOPRAZOLE SODIUM 40 MILLIGRAM(S): 20 TABLET, DELAYED RELEASE ORAL at 18:14

## 2019-05-10 RX ADMIN — CEFTRIAXONE 100 GRAM(S): 500 INJECTION, POWDER, FOR SOLUTION INTRAMUSCULAR; INTRAVENOUS at 03:50

## 2019-05-10 RX ADMIN — Medication 25 MILLIGRAM(S): at 18:14

## 2019-05-10 RX ADMIN — Medication 25 MILLIGRAM(S): at 23:42

## 2019-05-10 RX ADMIN — CHLORHEXIDINE GLUCONATE 1 APPLICATION(S): 213 SOLUTION TOPICAL at 08:43

## 2019-05-10 NOTE — PROGRESS NOTE ADULT - ASSESSMENT
5 yo BM with h/o HTN and ETOH abuse brought in by wife for generalized weakness and dark urine; pt  found to have lactic acidosis (8.8), possible RICHAR with Hb 2.9 and repeat 2.4. In the ER pt was hemodynamically stable    ID: No obvious infection, stop abx     CVS: Lactate has normalized    HEME: Transfuse a total of 4 units PRBC, PT with Anemia likely multifactorial, hemolysis, alcohol induced, will r/o gi cause.   Transfuse another 2unit today  Heme/onc Eval     FEN: Po diet/ Daily Thiamine, MVI and Folate      Renal: Follow BUN/Cr and UO  Suspect CKD 3-4, unable to r/o RICHAR yet as no baseline    Neuro/Psych: Standing Librium with CIWA Benzo coverage  L LE: CT findings: 1. Moderate soft tissue swelling about the left thigh and calf is   nonspecific and may represent an inflammatory or infectious process in the   appropriate clinical setting.   2. Heterogeneous ovoid mass within the posterolateral musculature the   distal thigh at the level of the knee. Limited without intravenous contrast.   Differential considerations include hemorrhagic/necrotic mass, complex   hematoma, and infection.  Need to follow L LE exam, no signs of compartment syndrome

## 2019-05-10 NOTE — CONSULT NOTE ADULT - PROBLEM SELECTOR RECOMMENDATION 9
- Periperhal Smear Reviewed - 5/10 - No Schistocytes, No Blasts; discussed with Hai Mckee for initial Slide from 5/8, spent 20 minutes with him searching for it not available   - Peripheral Smear ordered for tomorrow  - Direct Simon Test - Screen was negative  - Will start Emperic Steroids Solumedrol IV   - Folic Acid  - Follow Retic count, LDH, Bilirubin  - Cold Aggluttin Titre - Periperhal Smear Reviewed - 5/10 - No Schistocytes, No Blasts; discussed with Hai Mckee for initial Slide from 5/8, spent 20 minutes with him searching for it not available   - Peripheral Smear ordered for tomorrow  - Direct Simon Test - Screen was negative  - Will start Emperic Steroids Solumedrol IV   - Folic Acid  - Follow Retic count, LDH, Bilirubin  - Cold Aggluttin Titre  - PT/PTT/Fibrinogen  - AdamTS 13

## 2019-05-10 NOTE — CONSULT NOTE ADULT - SUBJECTIVE AND OBJECTIVE BOX
HPI:  54 year old M HTN and ETOH abuse brought in by wife for generalized weakness and dark urine.  Wife reports patient has been having non-bloody non-bilious vomiting last week which stopped his drinking on Friday 5/3.  Wife also notes he has been having easy bruising and L leg swelling over the same amount of time.  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  Wife subsequently added that he has been having weakness and fatigue since early March with episodes of gum bleeding going back to early January.  Of note, wife noted that his skin color has changed over the past week.  Pt denies fevers, chills, eye pain vision changes, (08 May 2019 23:01)  -------------------- As Above ----------------------------------------------------------------------------  The patient presented with L leg pain and weakness. Patient does state that over the past few weeks he was passing dark colored urin.  IN ER, HGB was 2.4  The patient denies melena, hematochezia, hematemesis, nausea, vomiting, abdominal pain, constipation, diarrhea, or change in bowel movements No new meds. Never had a colonoscopy.       PAST MEDICAL & SURGICAL HISTORY:  Benign essential HTN      MEDICATIONS  (STANDING):  chlordiazePOXIDE 25 milliGRAM(s) Oral every 6 hours  chlorhexidine 2% Cloths 1 Application(s) Topical daily  chlorhexidine 4% Liquid 1 Application(s) Topical <User Schedule>  folic acid 1 milliGRAM(s) Oral daily  methylPREDNISolone sodium succinate Injectable 100 milliGRAM(s) IV Push once  multivitamin 1 Tablet(s) Oral daily  pantoprazole  Injectable 40 milliGRAM(s) IV Push every 12 hours  thiamine 100 milliGRAM(s) Oral daily    MEDICATIONS  (PRN):  LORazepam   Injectable 2 milliGRAM(s) IV Push every 2 hours PRN CIWA-Ar score 8 or greater  polyethylene glycol 3350 17 Gram(s) Oral daily PRN Constipation      Allergies    No Known Allergies    Intolerances        FAMILY HISTORY:      REVIEW OF SYSTEMS:    CONSTITUTIONAL: No fever, weight loss, or fatigue  EYES: No eye pain, visual disturbances, or discharge  ENMT:  No difficulty hearing, tinnitus, vertigo; No sinus or throat pain  NECK: No pain or stiffness  BREASTS: No pain, masses, or nipple discharge  RESPIRATORY: No cough, wheezing, chills or hemoptysis; No shortness of breath  CARDIOVASCULAR: No chest pain, palpitations, dizziness, or leg swelling  GASTROINTESTINAL: See above  GENITOURINARY: No dysuria, frequency, hematuria, or incontinence  NEUROLOGICAL: No headaches, memory loss, loss of strength, numbness, or tremors  SKIN: No itching, burning, rashes, or lesions   LYMPH NODES: No enlarged glands  ENDOCRINE: No heat or cold intolerance; No hair loss  MUSCULOSKELETAL: No joint pain or swelling; No muscle, back, or extremity pain  PSYCHIATRIC: No depression, anxiety, mood swings, or difficulty sleeping  HEME/LYMPH: No easy bruising, or bleeding gums  ALLERGY AND IMMUNOLOGIC: No hives or eczema          SOCIAL HISTORY:    FAMILY HISTORY:      Vital Signs Last 24 Hrs  T(C): 36.6 (10 May 2019 11:05), Max: 36.9 (09 May 2019 19:12)  T(F): 97.9 (10 May 2019 11:05), Max: 98.4 (09 May 2019 19:12)  HR: 85 (10 May 2019 11:05) (83 - 90)  BP: 109/71 (10 May 2019 11:05) (90/53 - 128/65)  BP(mean): 76 (09 May 2019 22:30) (58 - 80)  RR: 18 (10 May 2019 11:05) (10 - 33)  SpO2: 100% (10 May 2019 11:05) (98% - 100%)    PHYSICAL EXAM:    GENERAL: NAD, well-groomed, well-developed  HEAD:  Atraumatic, Normocephalic  EYES: EOMI, PERRLA, conjunctiva and sclera clear  NECK: Supple, No JVD, Normal thyroid  NERVOUS SYSTEM:  Alert & Oriented X3, Good concentration;   CHEST/LUNG: Clear to percussion bilaterally; No rales, rhonchi, wheezing, or rubs  HEART: Regular rate and rhythm; No murmurs, rubs, or gallops  ABDOMEN: Soft, Nontender, Nondistended; Bowel sounds present  EXTREMITIES:  2+ Peripheral Pulses, No clubbing, cyanosis, or edema  LYMPH: No lymphadenopathy noted   RECTAL: deferred  SKIN: No rashes or lesions    LABS:                        5.1    7.25  )-----------( 80       ( 10 May 2019 07:17 )             15.1       CBC:  05-10 @ 07:17  WBC  7.25  HGB 5.1  HCT 15.1 Plate 80  MCV 96.8   @ 18:10  WBC  9.40  HGB 6.3  HCT 18.5 Plate 89  MCV 97.9   @ 06:11  WBC  8.89  HGB 4.3  HCT 13.4 Plate 77  .3   @ 22:33  WBC  11.16  HGB 2.4  HCT 8.6 Plate 88  .5   @ 21:32  WBC  12.38  HGB 2.9  HCT 10.1 Plate 115  .3           10 May 2019 07:17    141    |  108    |  36     ----------------------------<  107    3.6     |  22     |  2.45   09 May 2019 06:11    137    |  107    |  28     ----------------------------<  118    4.2     |  21     |  2.34   08 May 2019 21:32    138    |  105    |  25     ----------------------------<  118    3.8     |  15     |  2.57     Ca    7.5        10 May 2019 07:17  Ca    7.6        09 May 2019 06:11  Ca    8.2        08 May 2019 21:32  Phos  2.9       10 May 2019 07:17  Phos  3.4       09 May 2019 06:11  Mg     1.8       10 May 2019 07:17  Mg     1.7       09 May 2019 06:11    TPro  6.1    /  Alb  1.6    /  TBili  6.1    /  DBili  3.17   /  AST  67     /  ALT  13     /  AlkPhos  72     10 May 2019 07:17  TPro  6.2    /  Alb  2.1    /  TBili  x      /  DBili  x      /  AST  x      /  ALT  x      /  AlkPhos  x      09 May 2019 09:16  TPro  6.4    /  Alb  1.6    /  TBili  5.8    /  DBili  x      /  AST  65     /  ALT  13     /  AlkPhos  42     09 May 2019 06:11  TPro  7.4    /  Alb  1.9    /  TBili  6.3    /  DBili  x      /  AST  77     /  ALT  13     /  AlkPhos  55     08 May 2019 21:32    PT/INR - ( 09 May 2019 06:05 )   PT: 24.7 sec;   INR: 2.15 ratio         PTT - ( 09 May 2019 06:05 )  PTT:32.0 sec  Urinalysis Basic - ( 09 May 2019 05:13 )    Color: Yellow / Appearance: Clear / S.015 / pH: x  Gluc: x / Ketone: Trace  / Bili: Moderate / Urobili: 8 mg/dL   Blood: x / Protein: 15 mg/dL / Nitrite: Negative   Leuk Esterase: Trace / RBC: 0-2 /HPF / WBC 3-5   Sq Epi: x / Non Sq Epi: Moderate / Bacteria: Occasional          RADIOLOGY & ADDITIONAL STUDIES:  < from: CT Abdomen and Pelvis No Cont (19 @ 00:36) >    EXAM:  CT ABDOMEN AND PELVIS                            PROCEDURE DATE:  2019          INTERPRETATION:    ---------------------------------------------------------------------------  ---------------------------------------------    PRELIMINARY REPORT:    [facility] RADIOLOGIST PRELIMINARY REPORT    EXAM:    CT Abdomen and Pelvis Without Contrast     EXAM DATE/TIME:    2019 12:07 AM     CLINICAL HISTORY:    54 years old, male; Signs and symptoms; Other: Swelling to left lower   ext     TECHNIQUE:    Imaging protocol: Axial computed tomography images of the abdomen and   pelvis   without contrast. Coronal and sagittal reformatted images were created   and   reviewed.    Radiation optimization: All CT scans at this facility use at least one   of   these dose optimization techniques: automated exposure control; mA and/or   kV   adjustment per patient size (includes targeted exams where dose is   matched to   clinical indication); or iterative reconstruction.     COMPARISON:    No relevant prior studies available.     FINDINGS:   ABDOMEN:    Liver: Normal. No mass.    Gallbladder and bile ducts: Mild layering hyperdensity within distended   gallbladder lumen.    Pancreas: Pancreas appears normal.    Spleen: Spleen appears normal.    Adrenals: Right adrenal gland appears normal, let not seen well.    Kidneys and ureters: Bilateral kidneys appear lobulated and heterogenous   density.    Stomach and bowel: Small and large bowel loops appear normal in caliber.    Appendix: Appendix -not identified. No pericecal inflammatory changes.     PELVIS:    Bladder: Bladder appears within normal limits.    Reproductive: Prostate appears within normal limits.     ABDOMEN and PELVIS:    Intraperitoneal space: Moderate -marked diffuse intraabdominal and   pelvic free   fluid.    Bones/joints: Chronic degenerative changes of the lumbar spine.    Soft tissues: Unremarkable.    Vasculature: Suspected upper abdominal collateral vessels. Chronic   atherosclerotic calcification of the vasculature.    Lymph nodes: Normal. No enlarged lymph nodes.     IMPRESSION:   1. Moderate -marked diffuse intraabdominal and pelvic free fluid.   2. Gallstones in distended gallbladder.   3. Bilateral kidneys appear lobulated and heterogenous density.   Underlying   cysts or lesions cannot be excluded.      TIA MILLIGAN M.D.;[facility] RADIOLOGIST       ---------------------------------------------------------------------------  ---------------------------------------------    FINAL REPORT:    CLINICAL HISTORY: 54 years  Male with distension/jaundice.    COMPARISON: None.    PROCEDURE:   CT of the Abdomen and Pelvis was performed without intravenous contrast.   Intravenous contrast: None.  Oral contrast: None.  Sagittal and coronal reformats were performed.    LIMITATIONS: Evaluation of the solid organs and bowels limited without   oral and IV contrast.    FINDINGS:    LOWER CHEST: Within normal limits.    LIVER: Within normal limits.  BILE DUCTS: Normal caliber.  GALLBLADDER: Small calcified gallstones layering in the gallbladder.  SPLEEN: Within normal limits.  PANCREAS: Within normal limits.  ADRENALS: The adrenal glands are thickened bilaterally, greater on the   left.  KIDNEYS/URETERS: Lobulated heterogeneous. No hydronephrosis or calculi..    BLADDER: Within normal limits.  REPRODUCTIVE ORGANS: Small prostate. Symmetrical seminal vesicles.    BOWEL: Thickened ascending and transverse colon. No bowel obstruction.   The appendix is not visualized and cannot be assessed.diffuse nonspecific   gastric wall thickening.  PERITONEUM: Moderate perihepatic and perisplenic ascites tracking down   the paracolic gutters and into the pelvis. No pneumoperitoneum.  VESSELS:  Within normal limits.  RETROPERITONEUM: No lymphadenopathy.    ABDOMINAL WALL: Within normal limits.  BONES: Mild degenerative changes of the lumbar spine.    IMPRESSION:    Evaluation of the solid organs and bowel is limited without oral and IV   contrast. For better evaluation of jaundice, recommend contrast-enhanced   CT or MRI.    Thickened ascending and transverse colon consistent with colitis.    Diffuse nonspecific gastric wall thickening. Consider endoscopy.    Moderate ascites.    Thickened adrenal glands.    Cholelithiasis.    Lobulated heterogeneous kidneys. Cannot rule out underlying mass.                      AMARA WHITE M.D., ATTENDING RADIOLOGIST  This document has been electronically signed. May  9 2019 10:15AM                < end of copied text >

## 2019-05-10 NOTE — PROGRESS NOTE ADULT - SUBJECTIVE AND OBJECTIVE BOX
Patient is a 54y old  Male who presents with a chief complaint of anemia/ (09 May 2019 19:08)      INTERVAL HPI/OVERNIGHT EVENTS: no events     MEDICATIONS  (STANDING):  cefTRIAXone   IVPB 1 Gram(s) IV Intermittent every 24 hours  chlordiazePOXIDE 25 milliGRAM(s) Oral every 6 hours  chlorhexidine 2% Cloths 1 Application(s) Topical daily  chlorhexidine 4% Liquid 1 Application(s) Topical <User Schedule>  folic acid 1 milliGRAM(s) Oral daily  multivitamin 1 Tablet(s) Oral daily  pantoprazole  Injectable 40 milliGRAM(s) IV Push every 12 hours  thiamine 100 milliGRAM(s) Oral daily    MEDICATIONS  (PRN):  LORazepam   Injectable 2 milliGRAM(s) IV Push every 2 hours PRN CIWA-Ar score 8 or greater  polyethylene glycol 3350 17 Gram(s) Oral daily PRN Constipation      Allergies    No Known Allergies    Intolerances       Vital Signs Last 24 Hrs  T(C): 36.6 (10 May 2019 11:05), Max: 36.9 (09 May 2019 19:12)  T(F): 97.9 (10 May 2019 11:05), Max: 98.4 (09 May 2019 19:12)  HR: 85 (10 May 2019 11:05) (83 - 93)  BP: 109/71 (10 May 2019 11:05) (90/53 - 128/65)  BP(mean): 76 (09 May 2019 22:30) (58 - 82)  RR: 18 (10 May 2019 11:05) (10 - 33)  SpO2: 100% (10 May 2019 11:05) (98% - 100%)    PHYSICAL EXAM:  GENERAL: NAD, well-groomed, well-developed  HEAD:  Atraumatic, Normocephalic  EYES: EOMI, PERRLA, conjunctiva and sclera clear  ENMT: No tonsillar erythema, exudates, or enlargement; Moist mucous membranes, Good dentition, No lesions  NECK: Supple, No JVD, Normal thyroid  NERVOUS SYSTEM:  Alert & Oriented X3, Good concentration; Motor Strength 5/5 B/L upper and lower extremities; DTRs 2+ intact and symmetric  CHEST/LUNG: Clear to percussion bilaterally; No rales, rhonchi, wheezing, or rubs  HEART: Regular rate and rhythm; No murmurs, rubs, or gallops  ABDOMEN: Soft, Nontender, Nondistended; Bowel sounds present  EXTREMITIES:  2+ Peripheral Pulses, No clubbing, cyanosis, or edema, L leg bruising   LYMPH: No lymphadenopathy noted  SKIN: No rashes or lesions    LABS:                        5.1    7.25  )-----------( 80       ( 10 May 2019 07:17 )             15.1     05-10    141  |  108  |  36<H>  ----------------------------<  107<H>  3.6   |  22  |  2.45<H>    Ca    7.5<L>      10 May 2019 07:17  Phos  2.9     05-10  Mg     1.8     -10    TPro  6.1  /  Alb  1.6<L>  /  TBili  6.1<H>  /  DBili  3.17<H>  /  AST  67<H>  /  ALT  13  /  AlkPhos  72  05-10    PT/INR - ( 09 May 2019 06:05 )   PT: 24.7 sec;   INR: 2.15 ratio         PTT - ( 09 May 2019 06:05 )  PTT:32.0 sec  Urinalysis Basic - ( 09 May 2019 05:13 )    Color: Yellow / Appearance: Clear / S.015 / pH: x  Gluc: x / Ketone: Trace  / Bili: Moderate / Urobili: 8 mg/dL   Blood: x / Protein: 15 mg/dL / Nitrite: Negative   Leuk Esterase: Trace / RBC: 0-2 /HPF / WBC 3-5   Sq Epi: x / Non Sq Epi: Moderate / Bacteria: Occasional      CAPILLARY BLOOD GLUCOSE          RADIOLOGY & ADDITIONAL TESTS:    Imaging Personally Reviewed:  [ X] YES  [ ] NO    Consultant(s) Notes Reviewed:  [ X] YES  [ ] NO    Care Discussed with Consultants/Other Providers [X ] YES  [ ] NO

## 2019-05-10 NOTE — CONSULT NOTE ADULT - ASSESSMENT
HPI:  54 year old M HTN and ETOH abuse brought in by wife for generalized weakness and dark urine.  Wife reports patient has been having non-bloody non-bilious vomiting last week which stopped his drinking on Friday 5/3.  Wife also notes he has been having easy bruising and L leg swelling over the same amount of time.  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  Wife subsequently added that he has been having weakness and fatigue since early March with episodes of gum bleeding going back to early January.  Of note, wife noted that his skin color has changed over the past week.  Pt denies fevers, chills, eye pain vision changes, (08 May 2019 23:01)  -------------------- As Above ----------------------------------------------------------------------------  The patient presented with L leg pain and weakness. Patient does state that over the past few weeks he was passing dark colored urin.  IN ER, HGB was 2.4  The patient denies melena, hematochezia, hematemesis, nausea, vomiting, abdominal pain, constipation, diarrhea, or change in bowel movements No new meds. Never had a colonoscopy.     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 Will hold off on EGD / colonoscopy at present time. Patient on steroids.  Continue as per hematology  2) Elevated LFTs - bilirubin out of proportion to other results. Probably secondary to hemolysis. Patient may have underlying liver disease ( ETOH abuse ). Will need paracentesis  3) Abnormal CT scan  - stomach / colon - will need EGD / colon in near future

## 2019-05-10 NOTE — CONSULT NOTE ADULT - SUBJECTIVE AND OBJECTIVE BOX
Reason for Consultation: Anemia    HPI: Patient is a 54y Male seen on consultatioin for the evaluation and management of Anemia    54 year old M HTN and ETOH abuse brought in by wife for generalized weakness and dark urine.  Wife reports patient has been having non-bloody non-bilious vomiting last week which stopped his drinking on Friday 5/3.  Wife also notes he has been having easy bruising and L leg swelling over the same amount of time.  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  Wife subsequently added that he has been having weakness and fatigue since early March  No new Medications, Not happened in past    PAST MEDICAL & SURGICAL HISTORY:  Benign essential HTN      MEDICATIONS  (STANDING):  chlordiazePOXIDE 25 milliGRAM(s) Oral every 6 hours  chlorhexidine 2% Cloths 1 Application(s) Topical daily  chlorhexidine 4% Liquid 1 Application(s) Topical <User Schedule>  folic acid 1 milliGRAM(s) Oral daily  methylPREDNISolone sodium succinate Injectable 100 milliGRAM(s) IV Push once  multivitamin 1 Tablet(s) Oral daily  pantoprazole  Injectable 40 milliGRAM(s) IV Push every 12 hours  thiamine 100 milliGRAM(s) Oral daily    MEDICATIONS  (PRN):  LORazepam   Injectable 2 milliGRAM(s) IV Push every 2 hours PRN CIWA-Ar score 8 or greater  polyethylene glycol 3350 17 Gram(s) Oral daily PRN Constipation      Allergies    No Known Allergies    Intolerances        SOCIAL HISTORY:    Smoking Status: denies  Alcohol: Yes  Marital Status: yes  Occupation: yes    FAMILY HISTORY:      Vital Signs Last 24 Hrs  T(C): 36.6 (10 May 2019 11:05), Max: 36.9 (09 May 2019 19:12)  T(F): 97.9 (10 May 2019 11:05), Max: 98.4 (09 May 2019 19:12)  HR: 85 (10 May 2019 11:05) (83 - 90)  BP: 109/71 (10 May 2019 11:05) (90/53 - 128/65)  BP(mean): 76 (09 May 2019 22:30) (58 - 80)  RR: 18 (10 May 2019 11:05) (10 - 33)  SpO2: 100% (10 May 2019 11:05) (98% - 100%)    PHYSICAL EXAM:    general - AAO x 3  HEENT - Icterus +  CVS - RRR  RS - AE B/L  Abd - soft, NT  Ext - Pulses +        LABS:                        5.1    7.25  )-----------( 80       ( 10 May 2019 07:17 )             15.1     05-10    141  |  108  |  36<H>  ----------------------------<  107<H>  3.6   |  22  |  2.45<H>    Ca    7.5<L>      10 May 2019 07:17  Phos  2.9     0510  Mg     1.8     05-10    TPro  6.1  /  Alb  1.6<L>  /  TBili  6.1<H>  /  DBili  3.17<H>  /  AST  67<H>  /  ALT  13  /  AlkPhos  72  05-10    PT/INR - ( 09 May 2019 06:05 )   PT: 24.7 sec;   INR: 2.15 ratio         PTT - ( 09 May 2019 06:05 )  PTT:32.0 sec  Urinalysis Basic - ( 09 May 2019 05:13 )    Color: Yellow / Appearance: Clear / S.015 / pH: x  Gluc: x / Ketone: Trace  / Bili: Moderate / Urobili: 8 mg/dL   Blood: x / Protein: 15 mg/dL / Nitrite: Negative   Leuk Esterase: Trace / RBC: 0-2 /HPF / WBC 3-5   Sq Epi: x / Non Sq Epi: Moderate / Bacteria: Occasional        Culture - Urine (collected 09 May 2019 10:52)  Source: .Urine  Final Report (10 May 2019 15:26):    No growth    Culture - Blood (collected 09 May 2019 01:03)  Source: .Blood  Preliminary Report (10 May 2019 02:01):    No growth to date.    Culture - Blood (collected 09 May 2019 01:03)  Source: .Blood  Preliminary Report (10 May 2019 02:01):    No growth to date.        RADIOLOGY & ADDITIONAL STUDIES:

## 2019-05-11 LAB
ALBUMIN SERPL ELPH-MCNC: 1.8 G/DL — LOW (ref 3.3–5)
ALP SERPL-CCNC: 96 U/L — SIGNIFICANT CHANGE UP (ref 40–120)
ALT FLD-CCNC: 12 U/L — SIGNIFICANT CHANGE UP (ref 12–78)
ANION GAP SERPL CALC-SCNC: 12 MMOL/L — SIGNIFICANT CHANGE UP (ref 5–17)
APTT BLD: 32.4 SEC — SIGNIFICANT CHANGE UP (ref 27.5–36.3)
AST SERPL-CCNC: 76 U/L — HIGH (ref 15–37)
BILIRUB SERPL-MCNC: 8.8 MG/DL — HIGH (ref 0.2–1.2)
BLD GP AB SCN SERPL QL: SIGNIFICANT CHANGE UP
BUN SERPL-MCNC: 35 MG/DL — HIGH (ref 7–23)
CALCIUM SERPL-MCNC: 7.9 MG/DL — LOW (ref 8.5–10.1)
CHLORIDE SERPL-SCNC: 104 MMOL/L — SIGNIFICANT CHANGE UP (ref 96–108)
CO2 SERPL-SCNC: 21 MMOL/L — LOW (ref 22–31)
CREAT SERPL-MCNC: 2.08 MG/DL — HIGH (ref 0.5–1.3)
FERRITIN SERPL-MCNC: 129 NG/ML — SIGNIFICANT CHANGE UP (ref 30–400)
FIBRINOGEN PPP-MCNC: 153 MG/DL — LOW (ref 350–510)
FOLATE SERPL-MCNC: 11.4 NG/ML — SIGNIFICANT CHANGE UP
GLUCOSE SERPL-MCNC: 170 MG/DL — HIGH (ref 70–99)
HAV IGM SER-ACNC: SIGNIFICANT CHANGE UP
HBV CORE IGM SER-ACNC: SIGNIFICANT CHANGE UP
HBV SURFACE AG SER-ACNC: SIGNIFICANT CHANGE UP
HCT VFR BLD CALC: 19.5 % — CRITICAL LOW (ref 39–50)
HCT VFR BLD CALC: 21.8 % — LOW (ref 39–50)
HGB BLD-MCNC: 6.6 G/DL — CRITICAL LOW (ref 13–17)
HGB BLD-MCNC: 7.3 G/DL — LOW (ref 13–17)
INR BLD: 1.99 RATIO — HIGH (ref 0.88–1.16)
MCHC RBC-ENTMCNC: 30.9 PG — SIGNIFICANT CHANGE UP (ref 27–34)
MCHC RBC-ENTMCNC: 31.7 PG — SIGNIFICANT CHANGE UP (ref 27–34)
MCHC RBC-ENTMCNC: 33.5 GM/DL — SIGNIFICANT CHANGE UP (ref 32–36)
MCHC RBC-ENTMCNC: 33.8 GM/DL — SIGNIFICANT CHANGE UP (ref 32–36)
MCV RBC AUTO: 92.4 FL — SIGNIFICANT CHANGE UP (ref 80–100)
MCV RBC AUTO: 93.8 FL — SIGNIFICANT CHANGE UP (ref 80–100)
NRBC # BLD: 3 /100 WBCS — HIGH (ref 0–0)
NRBC # BLD: 4 /100 WBCS — HIGH (ref 0–0)
PLATELET # BLD AUTO: 82 K/UL — LOW (ref 150–400)
PLATELET # BLD AUTO: 90 K/UL — LOW (ref 150–400)
POTASSIUM SERPL-MCNC: 3.8 MMOL/L — SIGNIFICANT CHANGE UP (ref 3.5–5.3)
POTASSIUM SERPL-SCNC: 3.8 MMOL/L — SIGNIFICANT CHANGE UP (ref 3.5–5.3)
PROT SERPL-MCNC: 6.9 GM/DL — SIGNIFICANT CHANGE UP (ref 6–8.3)
PROTHROM AB SERPL-ACNC: 22.8 SEC — HIGH (ref 10–12.9)
RBC # BLD: 2.08 M/UL — LOW (ref 4.2–5.8)
RBC # BLD: 2.36 M/UL — LOW (ref 4.2–5.8)
RBC # BLD: 2.36 M/UL — LOW (ref 4.2–5.8)
RBC # FLD: 20.8 % — HIGH (ref 10.3–14.5)
RBC # FLD: 21.3 % — HIGH (ref 10.3–14.5)
RETICS #: 160.7 K/UL — HIGH (ref 25–125)
RETICS/RBC NFR: 6.8 % — HIGH (ref 0.5–2.5)
SODIUM SERPL-SCNC: 137 MMOL/L — SIGNIFICANT CHANGE UP (ref 135–145)
VIT B12 SERPL-MCNC: 882 PG/ML — SIGNIFICANT CHANGE UP (ref 232–1245)
WBC # BLD: 4.81 K/UL — SIGNIFICANT CHANGE UP (ref 3.8–10.5)
WBC # BLD: 6.45 K/UL — SIGNIFICANT CHANGE UP (ref 3.8–10.5)
WBC # FLD AUTO: 4.81 K/UL — SIGNIFICANT CHANGE UP (ref 3.8–10.5)
WBC # FLD AUTO: 6.45 K/UL — SIGNIFICANT CHANGE UP (ref 3.8–10.5)

## 2019-05-11 PROCEDURE — 73552 X-RAY EXAM OF FEMUR 2/>: CPT | Mod: 26,LT

## 2019-05-11 PROCEDURE — 99221 1ST HOSP IP/OBS SF/LOW 40: CPT

## 2019-05-11 PROCEDURE — 99233 SBSQ HOSP IP/OBS HIGH 50: CPT

## 2019-05-11 PROCEDURE — 73590 X-RAY EXAM OF LOWER LEG: CPT | Mod: 26,LT

## 2019-05-11 PROCEDURE — 73562 X-RAY EXAM OF KNEE 3: CPT | Mod: 26,LT

## 2019-05-11 RX ORDER — SODIUM CHLORIDE 9 MG/ML
1000 INJECTION INTRAMUSCULAR; INTRAVENOUS; SUBCUTANEOUS
Refills: 0 | Status: DISCONTINUED | OUTPATIENT
Start: 2019-05-11 | End: 2019-05-12

## 2019-05-11 RX ADMIN — CHLORHEXIDINE GLUCONATE 1 APPLICATION(S): 213 SOLUTION TOPICAL at 07:00

## 2019-05-11 RX ADMIN — Medication 25 MILLIGRAM(S): at 23:39

## 2019-05-11 RX ADMIN — Medication 100 MILLIGRAM(S): at 05:04

## 2019-05-11 RX ADMIN — Medication 1 TABLET(S): at 11:24

## 2019-05-11 RX ADMIN — Medication 25 MILLIGRAM(S): at 17:48

## 2019-05-11 RX ADMIN — Medication 100 MILLIGRAM(S): at 11:24

## 2019-05-11 RX ADMIN — PANTOPRAZOLE SODIUM 40 MILLIGRAM(S): 20 TABLET, DELAYED RELEASE ORAL at 05:04

## 2019-05-11 RX ADMIN — SODIUM CHLORIDE 75 MILLILITER(S): 9 INJECTION INTRAMUSCULAR; INTRAVENOUS; SUBCUTANEOUS at 11:20

## 2019-05-11 RX ADMIN — Medication 25 MILLIGRAM(S): at 11:27

## 2019-05-11 RX ADMIN — PANTOPRAZOLE SODIUM 40 MILLIGRAM(S): 20 TABLET, DELAYED RELEASE ORAL at 17:50

## 2019-05-11 RX ADMIN — Medication 25 MILLIGRAM(S): at 05:04

## 2019-05-11 RX ADMIN — Medication 1 MILLIGRAM(S): at 11:24

## 2019-05-11 NOTE — PROGRESS NOTE ADULT - SUBJECTIVE AND OBJECTIVE BOX
Patient feel better    Vital Signs Last 24 Hrs  T(C): 37 (11 May 2019 08:25), Max: 37.3 (10 May 2019 23:10)  T(F): 98.6 (11 May 2019 08:25), Max: 99.2 (10 May 2019 23:10)  HR: 82 (11 May 2019 08:25) (82 - 91)  BP: 100/55 (11 May 2019 08:25) (100/55 - 130/62)  BP(mean): --  RR: 18 (11 May 2019 08:25) (17 - 18)  SpO2: 99% (11 May 2019 08:25) (99% - 100%)    PHYSICAL EXAM:    general - AAO x 3  HEENT - Icterus +  CVS - RRR  RS - AE B/L  Abd - soft, NT  Ext - Pulses +        LABS:                        6.6    6.45  )-----------( 82       ( 11 May 2019 00:28 )             19.5     05-10    141  |  108  |  36<H>  ----------------------------<  107<H>  3.6   |  22  |  2.45<H>    Ca    7.5<L>      10 May 2019 07:17  Phos  2.9     05-10  Mg     1.8     05-10    TPro  6.1  /  Alb  1.6<L>  /  TBili  6.1<H>  /  DBili  3.17<H>  /  AST  67<H>  /  ALT  13  /  AlkPhos  72  05-10          Culture - Urine (collected 09 May 2019 10:52)  Source: .Urine  Final Report (10 May 2019 15:26):    No growth    Culture - Blood (collected 09 May 2019 01:03)  Source: .Blood  Preliminary Report (10 May 2019 02:01):    No growth to date.    Culture - Blood (collected 09 May 2019 01:03)  Source: .Blood  Preliminary Report (10 May 2019 02:01):    No growth to date.        RADIOLOGY & ADDITIONAL STUDIES:

## 2019-05-11 NOTE — CONSULT NOTE ADULT - SUBJECTIVE AND OBJECTIVE BOX
NEPHROLOGY CONSULTATION    CHIEF COMPLAINT:  Azotemia      HPI:  Admitted after a fall with profound weakness and dark urine.  Diagnosed and treated for hemolytic anemia;  he was transfused 6 units PRBC and is on steroids as well.  His serum Cr has improved from 2.6 to 2.1 and he has no baseline Cr or knowledge of CKD.    ROS:  denies SOB, nausea, vomiting, abdominal pain    PAST MEDICAL & SURGICAL HISTORY:  Benign essential HTN      SOCIAL HISTORY:  non smoker;  drinks 1 pint EtOH every 4 days    FAMILY HISTORY:  no CKD      MEDICATIONS  (STANDING):  chlordiazePOXIDE 25 milliGRAM(s) Oral every 6 hours  chlorhexidine 2% Cloths 1 Application(s) Topical daily  chlorhexidine 4% Liquid 1 Application(s) Topical <User Schedule>  folic acid 1 milliGRAM(s) Oral daily  methylPREDNISolone sodium succinate Injectable 100 milliGRAM(s) IV Push daily  multivitamin 1 Tablet(s) Oral daily  pantoprazole  Injectable 40 milliGRAM(s) IV Push every 12 hours  sodium chloride 0.9%. 1000 milliLiter(s) (75 mL/Hr) IV Continuous <Continuous>      PHYSICAL EXAMINATION:  T(F): 98.6 (05-11-19 @ 08:25)  HR: 82 (05-11-19 @ 08:25)  BP: 100/55 (05-11-19 @ 08:25)  RR: 18 (05-11-19 @ 08:25)  SpO2: 99% (05-11-19 @ 08:25)  Conversant, no apparent distress  PERRLA, pink conjunctivae, no ptosis  Good dentition, no pharyngeal erythema  Neck non tender, no mass, no thyromegaly or nodules  Normal respiratory effort, lungs clear to auscultation  Heart with RRR, no murmurs or rubs, 2+ LLE edema  Abdomen distended softly, no masses, no organomegaly  Skin no rashes, ulcers or lesions, normal turgor and temperature  Appropriate affect, AO x 3    LABS:                        7.3    4.81  )-----------( 90       ( 11 May 2019 10:36 )             21.8     05-11    137  |  104  |  35<H>  ----------------------------<  170<H>  3.8   |  21<L>  |  2.08<H>    Ca    7.9<L>      11 May 2019 10:36  Phos  2.9     05-10  Mg     1.8     05-10    TPro  6.9  /  Alb  1.8<L>  /  TBili  8.8<H>  /  DBili  4.47<H>  /  AST  76<H>  /  ALT  12  /  AlkPhos  96  05-11    RENAL SONO shows 11-12 cm kidneys    CXR NAPD    CT A/P shows moderate ascites among other findings    ASSESSMENT:  1.  Renal failure, presumption is acute given preserved renal sizes, working dx is hemoglobinuric ATN  2.  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

## 2019-05-11 NOTE — PHYSICAL THERAPY INITIAL EVALUATION ADULT - GAIT DEVIATIONS NOTED, PT EVAL
increased time in double stance/decreased stride length/decreased step length/decreased weight-shifting ability

## 2019-05-11 NOTE — CONSULT NOTE ADULT - SUBJECTIVE AND OBJECTIVE BOX
HPI:  53 yo M w/ PMHx of HTN who was admitted for severe anemia, in the ED pt was found to have severely low H/H and was admitted for further workup, orthopedics consulted for left lower extremity swelling and hematoma. Pt states he banged his left leg against the railing of his stairwell as he was trying to put out the trash. He states he was able to walk but noticed bruising and swelling around his entire left lower leg. Denies any other injuries, head trauma, LOC. Pt has been able to ambulate while in the hospital, but says he's been limping since two weeks ago. States he does not have pain but feels stiff. Denies numbness or tingling. No fevers, chills, sob, cp, n/v. No prior ortho surgeries. No AC.     PAST MEDICAL & SURGICAL HISTORY:  Benign essential HTN    Home Medications:  See Med Rec    Allergies    No Known Allergies    Intolerances    Vital Signs Last 24 Hrs  T(C): 36.3 (11 May 2019 12:53), Max: 37.3 (10 May 2019 23:10)  T(F): 97.3 (11 May 2019 12:53), Max: 99.2 (10 May 2019 23:10)  HR: 86 (11 May 2019 12:53) (82 - 91)  BP: 116/70 (11 May 2019 12:53) (100/55 - 130/62)  BP(mean): --  RR: 18 (11 May 2019 12:53) (17 - 18)  SpO2: 97% (11 May 2019 12:53) (97% - 100%)    PE:  Gen: NAD  LLE:  Skin intact, no erythema  Moderate to severe swelling of thigh/calf/foot  Compartments soft and compressible  No calf ttp  AROM knee intact 0-90, without tenderness  PROM knee intact 0-90, without tenderness  Able to SLR  Neg log roll/axial loading  +EHL/FHL/Gsc/TA  SILT L2-S1  2+ DP  Brisk cap refill    Secondary Survey:  No head trauma  No ttp along c/t/l/s spine  No ttp along bony prominences diffusely, other than mentioned above  +A/PROM intact in all joints diffusely, other than mentioned above  SILT/NVI diffusely  Compartments soft and compressible diffusely    CT LLE: demonstrating large hematoma in posterior distal thigh of LLE HPI:  55 yo M w/ PMHx of HTN who was admitted for severe anemia, in the ED pt was found to have severely low H/H and was admitted for further workup, orthopedics consulted for left lower extremity swelling and hematoma. Pt states he banged his left leg against the railing of his stairwell as he was trying to put out the trash. He states he was able to walk but noticed bruising and swelling around his entire left lower leg. Denies any other injuries, head trauma, LOC. Pt has been able to ambulate while in the hospital, but says he's been limping since two weeks ago. States he does not have pain but feels stiff. Of note, patient has history of EtOH abuse. Denies numbness or tingling. No fevers, chills, sob, cp, n/v. No prior ortho surgeries. No AC.     PAST MEDICAL & SURGICAL HISTORY:  Benign essential HTN    Home Medications:  See Med Rec    Allergies    No Known Allergies    Intolerances    Vital Signs Last 24 Hrs  T(C): 36.3 (11 May 2019 12:53), Max: 37.3 (10 May 2019 23:10)  T(F): 97.3 (11 May 2019 12:53), Max: 99.2 (10 May 2019 23:10)  HR: 86 (11 May 2019 12:53) (82 - 91)  BP: 116/70 (11 May 2019 12:53) (100/55 - 130/62)  BP(mean): --  RR: 18 (11 May 2019 12:53) (17 - 18)  SpO2: 97% (11 May 2019 12:53) (97% - 100%)    PE:  Gen: NAD, jaundiced  LLE:  Skin intact, no erythema  Moderate to severe swelling of thigh/calf/foot  Compartments soft and compressible  No calf ttp  AROM knee intact 0-90, without tenderness  PROM knee intact 0-90, without tenderness  Able to SLR  Neg log roll/axial loading  +EHL/FHL/Gsc/TA  SILT L2-S1  2+ DP  Brisk cap refill    Secondary Survey:  No head trauma  No ttp along c/t/l/s spine  No ttp along bony prominences diffusely, other than mentioned above  +A/PROM intact in all joints diffusely, other than mentioned above  SILT/NVI diffusely  Compartments soft and compressible diffusely    CT LLE: demonstrating large hematoma in posterior distal thigh of LLE HPI:  53 yo M w/ PMHx of HTN who was admitted for severe anemia, in the ED pt was found to have severely low H/H and was admitted for further workup, orthopedics consulted for left lower extremity swelling and hematoma. Pt states he banged his left leg against the railing of his stairwell as he was trying to put out the trash. He states he was able to walk but noticed bruising and swelling around his entire left lower leg. Denies any other injuries, head trauma, LOC. Pt has been able to ambulate while in the hospital, but says he's been limping since two weeks ago. States he does not have pain but feels stiff. Of note, patient has history of heavy EtOH abuse. Denies numbness or tingling. No fevers, chills, sob, cp, n/v. No prior ortho surgeries. No AC.     PAST MEDICAL & SURGICAL HISTORY:  Benign essential HTN    Home Medications:  See Med Rec    Allergies    No Known Allergies    Intolerances    Vital Signs Last 24 Hrs  T(C): 36.3 (11 May 2019 12:53), Max: 37.3 (10 May 2019 23:10)  T(F): 97.3 (11 May 2019 12:53), Max: 99.2 (10 May 2019 23:10)  HR: 86 (11 May 2019 12:53) (82 - 91)  BP: 116/70 (11 May 2019 12:53) (100/55 - 130/62)  BP(mean): --  RR: 18 (11 May 2019 12:53) (17 - 18)  SpO2: 97% (11 May 2019 12:53) (97% - 100%)    PE:  Gen: NAD, jaundiced  LLE:  Skin intact, no erythema, chronic skin changes noted  Moderate swelling of thigh/calf/foot pitting  Compartments soft and compressible  No calf ttp  AROM knee intact 0-90, without tenderness  PROM knee intact 0-110, without tenderness  Able to SLR  Neg log roll/axial loading  +EHL/FHL/Gsc/TA  SILT L2-S1  2+ DP  Brisk cap refill    Secondary Survey:  No head trauma  No ttp along c/t/l/s spine  No ttp along bony prominences diffusely, other than mentioned above  +A/PROM intact in all joints diffusely, other than mentioned above  SILT/NVI diffusely  Compartments soft and compressible diffusely    CT LLE: demonstrating large hematoma versus mass in posterior distal thigh of LLE

## 2019-05-11 NOTE — PHYSICAL THERAPY INITIAL EVALUATION ADULT - DISCHARGE DISPOSITION, PT EVAL
rehabilitation facility/Subacute rehab for supervised functional strengthening and mobility training.

## 2019-05-11 NOTE — PROGRESS NOTE ADULT - PROBLEM SELECTOR PLAN 1
- Morning Labs not drawn this morning yet, d/w nurse and phlebotomy aware to send bloods as soon as possible  - would avoid excess blood transfusions, and transfuse if patient symptomatic  - continue steroids, Folic Acid  - watch renal function - consider Renal Evaluation  - Suggest IVF - Morning Labs not drawn this morning yet, d/w nurse and phlebotomy aware to send bloods as soon as possible  - would avoid excess blood transfusions, and transfuse if patient symptomatic  - continue steroids, Folic Acid  - watch renal function - consider Renal Evaluation  - Suggest IVF    Addendum 5/11/2019 12:54 pm Labs reviewed, Platelets have come up, Hb better, low Fibringogen possible DIC, Simon Pending  - repeat coags and fibrinogen tomorrow    - Mass seen on CT of the Leg, Suggest Orthopeadic Evaluation ? infection, tumor, hemorrhage

## 2019-05-11 NOTE — PHYSICAL THERAPY INITIAL EVALUATION ADULT - PLANNED THERAPY INTERVENTIONS, PT EVAL
neuromuscular re-education/postural re-education/stretching/balance training/bed mobility training/gait training/strengthening/transfer training/ROM

## 2019-05-11 NOTE — PROGRESS NOTE ADULT - ASSESSMENT
5 yo BM with h/o HTN and ETOH abuse brought in by wife for generalized weakness and dark urine; pt  found to have lactic acidosis (8.8), possible RICHAR with Hb 2.9 and repeat 2.4. In the ER pt was hemodynamically stable    ID: No obvious infection, stop abx     CVS: Lactate has normalized    HEME: Transfuse a total of 6 units PRBC, PT with Anemia likely multifactorial, hemolysis, alcohol induced, will r/o gi cause.   Transfuse another unit today  Heme/onc Eval appreciated, on steroids     FEN: Po diet/ Daily Thiamine, MVI and Folate      Renal: Follow BUN/Cr and UO  Suspect CKD 3-4, unable to r/o RCIHAR yet as no baseline    Neuro/Psych: Standing Librium with CIWA Benzo coverage  L LE: CT findings: 1. Moderate soft tissue swelling about the left thigh and calf is   nonspecific and may represent an inflammatory or infectious process in the   appropriate clinical setting.   2. Heterogeneous ovoid mass within the posterolateral musculature the   distal thigh at the level of the knee. Limited without intravenous contrast.   Differential considerations include hemorrhagic/necrotic mass, complex   hematoma, and infection.  Need to follow L LE exam, no signs of compartment syndrome , seems to be improving, will have PT evaluate as patient feels unsteady

## 2019-05-11 NOTE — PHYSICAL THERAPY INITIAL EVALUATION ADULT - LEVEL OF INDEPENDENCE: STAIR NEGOTIATION, REHAB EVAL
no
Pt does not display sufficient hip strength or foot clearance required for safe stair negotiation at this time

## 2019-05-11 NOTE — PROGRESS NOTE ADULT - SUBJECTIVE AND OBJECTIVE BOX
Patient is a 54y old  Male who presents with a chief complaint of anemia/ (10 May 2019 17:27)      INTERVAL HPI/OVERNIGHT EVENTS: no events     MEDICATIONS  (STANDING):  chlordiazePOXIDE 25 milliGRAM(s) Oral every 6 hours  chlorhexidine 2% Cloths 1 Application(s) Topical daily  chlorhexidine 4% Liquid 1 Application(s) Topical <User Schedule>  folic acid 1 milliGRAM(s) Oral daily  methylPREDNISolone sodium succinate Injectable 100 milliGRAM(s) IV Push daily  multivitamin 1 Tablet(s) Oral daily  pantoprazole  Injectable 40 milliGRAM(s) IV Push every 12 hours  thiamine 100 milliGRAM(s) Oral daily    MEDICATIONS  (PRN):  LORazepam   Injectable 2 milliGRAM(s) IV Push every 2 hours PRN CIWA-Ar score 8 or greater  polyethylene glycol 3350 17 Gram(s) Oral daily PRN Constipation      Allergies    No Known Allergies    Intolerances           Vital Signs Last 24 Hrs  T(C): 37 (11 May 2019 08:25), Max: 37.3 (10 May 2019 23:10)  T(F): 98.6 (11 May 2019 08:25), Max: 99.2 (10 May 2019 23:10)  HR: 82 (11 May 2019 08:25) (82 - 91)  BP: 100/55 (11 May 2019 08:25) (100/55 - 130/62)  BP(mean): --  RR: 18 (11 May 2019 08:25) (17 - 18)  SpO2: 99% (11 May 2019 08:25) (99% - 100%)    PHYSICAL EXAM:  GENERAL: NAD, well-groomed, well-developed  HEAD:  Atraumatic, Normocephalic  EYES: EOMI, PERRLA, conjunctiva and sclera clear  ENMT: No tonsillar erythema, exudates, or enlargement; Moist mucous membranes, Good dentition, No lesions  NECK: Supple, No JVD, Normal thyroid  NERVOUS SYSTEM:  Alert & Oriented X3, Good concentration; Motor Strength 5/5 B/L upper and lower extremities; DTRs 2+ intact and symmetric  CHEST/LUNG: Clear to percussion bilaterally; No rales, rhonchi, wheezing, or rubs  HEART: Regular rate and rhythm; No murmurs, rubs, or gallops  ABDOMEN: Soft, Nontender, Nondistended; Bowel sounds present  EXTREMITIES:  2+ Peripheral Pulses, No clubbing, cyanosis, or edema, L leg bruising improving   LYMPH: No lymphadenopathy noted  SKIN: No rashes or lesions      LABS:                        6.6    6.45  )-----------( 82       ( 11 May 2019 00:28 )             19.5     05-10    141  |  108  |  36<H>  ----------------------------<  107<H>  3.6   |  22  |  2.45<H>    Ca    7.5<L>      10 May 2019 07:17  Phos  2.9     05-10  Mg     1.8     05-10    TPro  6.1  /  Alb  1.6<L>  /  TBili  6.1<H>  /  DBili  3.17<H>  /  AST  67<H>  /  ALT  13  /  AlkPhos  72  05-10        CAPILLARY BLOOD GLUCOSE          RADIOLOGY & ADDITIONAL TESTS:    Imaging Personally Reviewed:  [ X] YES  [ ] NO    Consultant(s) Notes Reviewed:  [ X] YES  [ ] NO    Care Discussed with Consultants/Other Providers [X ] YES  [ ] NO

## 2019-05-11 NOTE — PHYSICAL THERAPY INITIAL EVALUATION ADULT - ADDITIONAL COMMENTS
Pt lives in 2nd floor apartment of private house with his wife and son who is wheel-chair bound, 1 step to front door (-) railing, 5 steps to 1st floor (+) railing, and about 13 steps to 2nd floor (+) railing. Pt reports he takes the city bus to appointments as needed. Pt works part-time as superintendant for a landlord, and spends a lot of time taking care of his son who is wheel-chair bound. Pt reports he was independent with all functional mobility prior to admission. Bathroom (+) for tub/shower combo, no grab bars but pt was planning to install some to assist his son.

## 2019-05-11 NOTE — PHYSICAL THERAPY INITIAL EVALUATION ADULT - IMPAIRMENTS FOUND, PT EVAL
joint integrity and mobility/neuromotor development and sensory integration/poor safety awareness/gait, locomotion, and balance/muscle strength/gross motor/aerobic capacity/endurance

## 2019-05-11 NOTE — CONSULT NOTE ADULT - ASSESSMENT
A/P:  53 yo M w/ LLE hematoma in distal posterior thigh:  -compressive ace  -WBAT/PT/OT  -recommend gen surg consult   -FU Heme Onc recs for workup of patient coagulopathy  -medical management  -SCDs/Ice  -no acute orthopedic surgical intervention indicated at this time  -ortho stable  -dispo planning  -will discuss with attending and update plan if any changes A/P:  53 yo M w/ LLE hematoma in distal posterior thigh:  -Recommend MRI w/w/o contrast for further workup, if not able to tolerate contrast, MRI w/o contrast  -compressive ace  -WBAT/PT/OT  -recommend gen surg consult   -FU Heme Onc recs for workup of patient coagulopathy  -medical management  -SCDs/Ice  -no acute orthopedic surgical intervention indicated at this time  -ortho stable  -dispo planning  -will discuss with attending and update plan if any changes A/P:  53 yo M w/ LLE hematoma versus mass in distal posterior thigh:  -Recommend MRI w/w/o contrast for further workup, if not able to tolerate contrast, MRI w/o contrast  -compressive ace  -WBAT/PT/OT  -FU Heme Onc recs for workup of patient coagulopathy  -medical management  -SCDs/Ice  -no acute orthopedic surgical intervention indicated at this time  - discussed with Dr. Drummond who agrees with above

## 2019-05-11 NOTE — PHYSICAL THERAPY INITIAL EVALUATION ADULT - RANGE OF MOTION EXAMINATION, REHAB EVAL
bilateral upper extremity ROM was WFL (within functional limits)/bilateral lower extremity ROM was WFL (within functional limits)/except L knee restricted by ace bandage

## 2019-05-11 NOTE — PHYSICAL THERAPY INITIAL EVALUATION ADULT - CRITERIA FOR SKILLED THERAPEUTIC INTERVENTIONS
anticipated discharge recommendation/rehab potential/risk reduction/prevention/predicted duration of therapy intervention/therapy frequency/impairments found/functional limitations in following categories

## 2019-05-11 NOTE — PHYSICAL THERAPY INITIAL EVALUATION ADULT - PERTINENT HX OF CURRENT PROBLEM, REHAB EVAL
Pt is a 54y.o. M admitted to [facility] for general weakness and dark urine found to have severe anemia, now s/p 7 units PRBC since admission, (+) LLE swelling and hematoma near L knee. Per EMR 5/11/19 @ 10:36, HgB = 7.3, Hct = 21/8, Plt = 90.

## 2019-05-12 LAB
ALBUMIN SERPL ELPH-MCNC: 1.8 G/DL — LOW (ref 3.3–5)
ALP SERPL-CCNC: 79 U/L — SIGNIFICANT CHANGE UP (ref 40–120)
ALT FLD-CCNC: 13 U/L — SIGNIFICANT CHANGE UP (ref 12–78)
ANION GAP SERPL CALC-SCNC: 10 MMOL/L — SIGNIFICANT CHANGE UP (ref 5–17)
ANISOCYTOSIS BLD QL: SIGNIFICANT CHANGE UP
APTT BLD: 32.2 SEC — SIGNIFICANT CHANGE UP (ref 28.5–37)
AST SERPL-CCNC: 63 U/L — HIGH (ref 15–37)
BASOPHILS # BLD AUTO: 0 K/UL — SIGNIFICANT CHANGE UP (ref 0–0.2)
BASOPHILS NFR BLD AUTO: 0 % — SIGNIFICANT CHANGE UP (ref 0–2)
BILIRUB DIRECT SERPL-MCNC: 4.11 MG/DL — HIGH (ref 0.05–0.2)
BILIRUB INDIRECT FLD-MCNC: 3.9 MG/DL — HIGH (ref 0.2–1)
BILIRUB SERPL-MCNC: 8 MG/DL — HIGH (ref 0.2–1.2)
BUN SERPL-MCNC: 39 MG/DL — HIGH (ref 7–23)
CALCIUM SERPL-MCNC: 8.2 MG/DL — LOW (ref 8.5–10.1)
CHLORIDE SERPL-SCNC: 105 MMOL/L — SIGNIFICANT CHANGE UP (ref 96–108)
CO2 SERPL-SCNC: 23 MMOL/L — SIGNIFICANT CHANGE UP (ref 22–31)
CREAT SERPL-MCNC: 1.71 MG/DL — HIGH (ref 0.5–1.3)
DIR ANTIGLOB POLYSPECIFIC INTERPRETATION: SIGNIFICANT CHANGE UP
EOSINOPHIL # BLD AUTO: 0 K/UL — SIGNIFICANT CHANGE UP (ref 0–0.5)
EOSINOPHIL NFR BLD AUTO: 0 % — SIGNIFICANT CHANGE UP (ref 0–6)
FIBRINOGEN PPP-MCNC: 162 MG/DL — LOW (ref 350–510)
GLUCOSE SERPL-MCNC: 108 MG/DL — HIGH (ref 70–99)
HCT VFR BLD CALC: 18.7 % — CRITICAL LOW (ref 39–50)
HGB BLD-MCNC: 6.2 G/DL — CRITICAL LOW (ref 13–17)
HYPOCHROMIA BLD QL: SIGNIFICANT CHANGE UP
INR BLD: 2.04 RATIO — HIGH (ref 0.88–1.16)
IRON SATN MFR SERPL: 42 % — SIGNIFICANT CHANGE UP (ref 16–55)
IRON SATN MFR SERPL: 62 UG/DL — SIGNIFICANT CHANGE UP (ref 45–165)
LDH SERPL L TO P-CCNC: 435 U/L — HIGH (ref 50–242)
LDH SERPL L TO P-CCNC: 443 U/L — HIGH (ref 50–242)
LYMPHOCYTES # BLD AUTO: 0.59 K/UL — LOW (ref 1–3.3)
LYMPHOCYTES # BLD AUTO: 8 % — LOW (ref 13–44)
MACROCYTES BLD QL: SIGNIFICANT CHANGE UP
MANUAL SMEAR VERIFICATION: SIGNIFICANT CHANGE UP
MCHC RBC-ENTMCNC: 31.2 PG — SIGNIFICANT CHANGE UP (ref 27–34)
MCHC RBC-ENTMCNC: 33.2 GM/DL — SIGNIFICANT CHANGE UP (ref 32–36)
MCV RBC AUTO: 94 FL — SIGNIFICANT CHANGE UP (ref 80–100)
MONOCYTES # BLD AUTO: 0.88 K/UL — SIGNIFICANT CHANGE UP (ref 0–0.9)
MONOCYTES NFR BLD AUTO: 12 % — SIGNIFICANT CHANGE UP (ref 2–14)
NEUTROPHILS # BLD AUTO: 5.86 K/UL — SIGNIFICANT CHANGE UP (ref 1.8–7.4)
NEUTROPHILS NFR BLD AUTO: 80 % — HIGH (ref 43–77)
NRBC # BLD: 2 /100 — HIGH (ref 0–0)
NRBC # BLD: SIGNIFICANT CHANGE UP /100 WBCS (ref 0–0)
PLAT MORPH BLD: NORMAL — SIGNIFICANT CHANGE UP
PLATELET # BLD AUTO: 101 K/UL — LOW (ref 150–400)
POLYCHROMASIA BLD QL SMEAR: SLIGHT — SIGNIFICANT CHANGE UP
POTASSIUM SERPL-MCNC: 3.8 MMOL/L — SIGNIFICANT CHANGE UP (ref 3.5–5.3)
POTASSIUM SERPL-SCNC: 3.8 MMOL/L — SIGNIFICANT CHANGE UP (ref 3.5–5.3)
PROT SERPL-MCNC: 6.7 GM/DL — SIGNIFICANT CHANGE UP (ref 6–8.3)
PROTHROM AB SERPL-ACNC: 23.4 SEC — HIGH (ref 10–12.9)
RBC # BLD: 1.99 M/UL — LOW (ref 4.2–5.8)
RBC # BLD: 1.99 M/UL — LOW (ref 4.2–5.8)
RBC # FLD: 23.9 % — HIGH (ref 10.3–14.5)
RBC BLD AUTO: ABNORMAL
RETICS #: 167.2 K/UL — HIGH (ref 25–125)
RETICS/RBC NFR: 8.4 % — HIGH (ref 0.5–2.5)
SODIUM SERPL-SCNC: 138 MMOL/L — SIGNIFICANT CHANGE UP (ref 135–145)
TIBC SERPL-MCNC: 148 UG/DL — LOW (ref 220–430)
UIBC SERPL-MCNC: 86 UG/DL — LOW (ref 110–370)
WBC # BLD: 7.32 K/UL — SIGNIFICANT CHANGE UP (ref 3.8–10.5)
WBC # FLD AUTO: 7.32 K/UL — SIGNIFICANT CHANGE UP (ref 3.8–10.5)

## 2019-05-12 PROCEDURE — 99233 SBSQ HOSP IP/OBS HIGH 50: CPT

## 2019-05-12 RX ORDER — PHYTONADIONE (VIT K1) 5 MG
5 TABLET ORAL ONCE
Refills: 0 | Status: COMPLETED | OUTPATIENT
Start: 2019-05-12 | End: 2019-05-12

## 2019-05-12 RX ADMIN — Medication 25 MILLIGRAM(S): at 17:19

## 2019-05-12 RX ADMIN — Medication 5 MILLIGRAM(S): at 13:36

## 2019-05-12 RX ADMIN — Medication 100 MILLIGRAM(S): at 06:28

## 2019-05-12 RX ADMIN — PANTOPRAZOLE SODIUM 40 MILLIGRAM(S): 20 TABLET, DELAYED RELEASE ORAL at 17:20

## 2019-05-12 RX ADMIN — SODIUM CHLORIDE 75 MILLILITER(S): 9 INJECTION INTRAMUSCULAR; INTRAVENOUS; SUBCUTANEOUS at 06:29

## 2019-05-12 RX ADMIN — Medication 25 MILLIGRAM(S): at 06:29

## 2019-05-12 RX ADMIN — Medication 25 MILLIGRAM(S): at 11:30

## 2019-05-12 RX ADMIN — Medication 25 MILLIGRAM(S): at 23:53

## 2019-05-12 RX ADMIN — Medication 1 MILLIGRAM(S): at 11:32

## 2019-05-12 RX ADMIN — PANTOPRAZOLE SODIUM 40 MILLIGRAM(S): 20 TABLET, DELAYED RELEASE ORAL at 06:28

## 2019-05-12 RX ADMIN — Medication 1 TABLET(S): at 11:30

## 2019-05-12 NOTE — PROGRESS NOTE ADULT - ASSESSMENT
5 yo BM with h/o HTN and ETOH abuse brought in by wife for generalized weakness and dark urine; pt  found to have lactic acidosis (8.8), possible RICHAR with Hb 2.9 and repeat 2.4. In the ER pt was hemodynamically stable    ID: No obvious infection, stop abx     CVS: Lactate has normalized    HEME: Transfused a total of 7 units PRBC, PT with Anemia likely multifactorial, hemolysis, alcohol induced, will r/o gi cause.   Transfuse another unit today  Heme/onc Eval appreciated, on steroids . Possible DIC . Will hold off transfusion today.     FEN: Po diet/ Daily Thiamine, MVI and Folate      Renal: Follow BUN/Cr and UO  Suspect CKD 3-4, Pt with ATN     Neuro/Psych: Standing Librium with CIWA Benzo coverage  L LE: CT findings: 1. Moderate soft tissue swelling about the left thigh and calf is   nonspecific and may represent an inflammatory or infectious process in the   appropriate clinical setting.   2. Heterogeneous ovoid mass within the posterolateral musculature the   distal thigh at the level of the knee. Limited without intravenous contrast.   Differential considerations include hemorrhagic/necrotic mass, complex   hematoma, and infection.  Need to follow L LE exam, no signs of compartment syndrome , seems to be improving, will have PT evaluate as patient feels unsteady   Ortho On board, MRI ordered

## 2019-05-12 NOTE — PROGRESS NOTE ADULT - SUBJECTIVE AND OBJECTIVE BOX
Lying in bed, feeling better    Vital Signs Last 24 Hrs  T(C): 36 (12 May 2019 12:00), Max: 36.3 (11 May 2019 12:53)  T(F): 96.8 (12 May 2019 12:00), Max: 97.3 (11 May 2019 12:53)  HR: 79 (12 May 2019 12:00) (79 - 86)  BP: 111/60 (12 May 2019 12:00) (101/52 - 125/74)  BP(mean): --  RR: 18 (12 May 2019 12:00) (16 - 18)  SpO2: 100% (12 May 2019 12:00) (95% - 100%)    PHYSICAL EXAM:    general - AAO x 3  HEENT - Icterus +  CVS - RRR  RS - AE B/L  Abd - soft, NT  Ext - Pulses +        LABS:                        6.2    7.32  )-----------( 101      ( 12 May 2019 08:31 )             18.7     05-12    138  |  105  |  39<H>  ----------------------------<  108<H>  3.8   |  23  |  1.71<H>    Ca    8.2<L>      12 May 2019 08:31    TPro  6.7  /  Alb  1.8<L>  /  TBili  8.0<H>  /  DBili  4.11<H>  /  AST  63<H>  /  ALT  13  /  AlkPhos  79  05-12    PT/INR - ( 12 May 2019 08:31 )   PT: 23.4 sec;   INR: 2.04 ratio         PTT - ( 12 May 2019 08:31 )  PTT:32.2 sec      Culture - Urine (collected 09 May 2019 10:52)  Source: .Urine  Final Report (10 May 2019 15:26):    No growth    Culture - Blood (collected 09 May 2019 01:03)  Source: .Blood  Preliminary Report (10 May 2019 02:01):    No growth to date.    Culture - Blood (collected 09 May 2019 01:03)  Source: .Blood  Preliminary Report (10 May 2019 02:01):    No growth to date.        RADIOLOGY & ADDITIONAL STUDIES:

## 2019-05-12 NOTE — PROGRESS NOTE ADULT - SUBJECTIVE AND OBJECTIVE BOX
Pt S/E at bedside, no acute events overnight, pain controlled, sleeping but arousable on exam    Vital Signs Last 24 Hrs  T(C): 36.2 (12 May 2019 05:30), Max: 37 (11 May 2019 08:25)  T(F): 97.1 (12 May 2019 05:30), Max: 98.6 (11 May 2019 08:25)  HR: 85 (12 May 2019 05:30) (79 - 86)  BP: 101/52 (12 May 2019 05:30) (100/55 - 125/74)  BP(mean): --  RR: 17 (12 May 2019 05:30) (16 - 18)  SpO2: 99% (12 May 2019 05:30) (95% - 100%)    Gen: NAD,    Left Lower Extremity:  Ace wrap in place on thigh,   2+ Pitting edema to entire leg with chronic skin changes and aching ecchymosis  +EHL/FHL/TA/GS  SILT L3-S1  +DP/PT Pulses  Compartments soft  No calf TTP B/L

## 2019-05-12 NOTE — PROGRESS NOTE ADULT - SUBJECTIVE AND OBJECTIVE BOX
NEPHROLOGY PROGRESS NOTE    CHIEF COMPLAINT:  RICHAR    HPI:  Kidney function improving.  Remains very anemic with Hg back down in 6-range.    ROS:  denies SOB    EXAM:  T(F): 97.1 (05-12-19 @ 05:30)  HR: 85 (05-12-19 @ 05:30)  BP: 101/52 (05-12-19 @ 05:30)  RR: 17 (05-12-19 @ 05:30)  SpO2: 99% (05-12-19 @ 05:30)    Conversant, in no apparent distress  Normal respiratory effort, lungs clear bilaterally  Heart RRR with no murmur, left leg very swollen         LABS                             6.2    7.32  )-----------( 101      ( 12 May 2019 08:31 )             18.7          05-12    138  |  105  |  39<H>  ----------------------------<  108<H>  3.8   |  23  |  1.71<H>    Ca    8.2<L>      12 May 2019 08:31    TPro  6.7  /  Alb  1.8<L>  /  TBili  8.0<H>  /  DBili  4.11<H>  /  AST  63<H>  /  ALT  13  /  AlkPhos  79  05-12    ASSESSMENT:  1.  Renal failure, presumption is acute given preserved renal sizes, working dx is hemoglobinuric ATN  2.  Anemia, hemolytic, hematology does not favor dx of TTP in absence of schistocytes on smear    PLAN:  Discontinue IV fluid  Monitor daily BMP

## 2019-05-12 NOTE — PROGRESS NOTE ADULT - SUBJECTIVE AND OBJECTIVE BOX
Patient is a 54y old  Male who presents with a chief complaint of anemia/ (12 May 2019 07:38)      INTERVAL HPI/OVERNIGHT EVENTS: no events     MEDICATIONS  (STANDING):  chlordiazePOXIDE 25 milliGRAM(s) Oral every 6 hours  chlorhexidine 2% Cloths 1 Application(s) Topical daily  chlorhexidine 4% Liquid 1 Application(s) Topical <User Schedule>  folic acid 1 milliGRAM(s) Oral daily  methylPREDNISolone sodium succinate Injectable 100 milliGRAM(s) IV Push daily  multivitamin 1 Tablet(s) Oral daily  pantoprazole  Injectable 40 milliGRAM(s) IV Push every 12 hours  sodium chloride 0.9%. 1000 milliLiter(s) (75 mL/Hr) IV Continuous <Continuous>    MEDICATIONS  (PRN):  LORazepam   Injectable 2 milliGRAM(s) IV Push every 2 hours PRN CIWA-Ar score 8 or greater  polyethylene glycol 3350 17 Gram(s) Oral daily PRN Constipation      Allergies    No Known Allergies    Intolerances         Vital Signs Last 24 Hrs  T(C): 36.2 (12 May 2019 05:30), Max: 36.3 (11 May 2019 12:53)  T(F): 97.1 (12 May 2019 05:30), Max: 97.3 (11 May 2019 12:53)  HR: 85 (12 May 2019 05:30) (79 - 86)  BP: 101/52 (12 May 2019 05:30) (101/52 - 125/74)  BP(mean): --  RR: 17 (12 May 2019 05:30) (16 - 18)  SpO2: 99% (12 May 2019 05:30) (95% - 100%)    PHYSICAL EXAM:  GENERAL: NAD, well-groomed, well-developed  HEAD:  Atraumatic, Normocephalic  EYES: EOMI, PERRLA, conjunctiva and sclera clear  ENMT: No tonsillar erythema, exudates, or enlargement; Moist mucous membranes, Good dentition, No lesions  NECK: Supple, No JVD, Normal thyroid  NERVOUS SYSTEM:  Alert & Oriented X3, Good concentration; Motor Strength 5/5 B/L upper and lower extremities; DTRs 2+ intact and symmetric  CHEST/LUNG: Clear to percussion bilaterally; No rales, rhonchi, wheezing, or rubs  HEART: Regular rate and rhythm; No murmurs, rubs, or gallops  ABDOMEN: Soft, Nontender, Nondistended; Bowel sounds present  EXTREMITIES:  2+ Peripheral Pulses, No clubbing, cyanosis, or edema, L leg bruising improving   LYMPH: No lymphadenopathy noted  SKIN: No rashes or lesions    LABS:                        6.2    7.32  )-----------( 101      ( 12 May 2019 08:31 )             18.7     05-12    138  |  105  |  39<H>  ----------------------------<  108<H>  3.8   |  23  |  1.71<H>    Ca    8.2<L>      12 May 2019 08:31    TPro  6.7  /  Alb  1.8<L>  /  TBili  8.0<H>  /  DBili  4.11<H>  /  AST  63<H>  /  ALT  13  /  AlkPhos  79  05-12    PT/INR - ( 12 May 2019 08:31 )   PT: 23.4 sec;   INR: 2.04 ratio         PTT - ( 12 May 2019 08:31 )  PTT:32.2 sec    CAPILLARY BLOOD GLUCOSE      POCT Blood Glucose.: 252 mg/dL (11 May 2019 11:26)      RADIOLOGY & ADDITIONAL TESTS:    Imaging Personally Reviewed:  [ X] YES  [ ] NO    Consultant(s) Notes Reviewed:  [ X] YES  [ ] NO    Care Discussed with Consultants/Other Providers [X ] YES  [ ] NO

## 2019-05-12 NOTE — PROGRESS NOTE ADULT - PROBLEM SELECTOR PLAN 1
- d/w Lab, Direct Simon test still not available, waiting for call back with preliminary result, was ordered yesterday, continue emperic steroids for now  - check Cold Agglutinin titre  - watch blood counts, daily cbc, ldh, retic count, Fibrinogen  - transfuse if patient symptomatic - d/w Lab, Direct Simon test still not available, waiting for call back with preliminary result, was ordered yesterday, continue emperic steroids for now  - check Cold Agglutinin titre  - watch blood counts, daily cbc, ldh, retic count, Fibrinogen  - transfuse if patient symptomatic  - check YARED, EBV, Mycoplasma titres, immunoglobulin panel

## 2019-05-12 NOTE — PROGRESS NOTE ADULT - ASSESSMENT
A/P:  55 yo M w/ LLE hematoma versus mass in distal posterior thigh:  -Recommend MRI w/w/o contrast for further workup, if not able to tolerate contrast, MRI w/o contrast, hopefully will be able to obtain today  -compressive ace  -WBAT/PT/OT  -FU Heme Onc recs for workup of patient coagulopathy  -medical management  -SCDs/Ice  -no acute orthopedic surgical intervention indicated at this time  - discussed with Dr. Drummond who agrees with above

## 2019-05-13 DIAGNOSIS — R74.8 ABNORMAL LEVELS OF OTHER SERUM ENZYMES: ICD-10-CM

## 2019-05-13 LAB
ALBUMIN SERPL ELPH-MCNC: 1.7 G/DL — LOW (ref 3.3–5)
ALP SERPL-CCNC: 114 U/L — SIGNIFICANT CHANGE UP (ref 40–120)
ALT FLD-CCNC: 11 U/L — LOW (ref 12–78)
ANION GAP SERPL CALC-SCNC: 10 MMOL/L — SIGNIFICANT CHANGE UP (ref 5–17)
ANISOCYTOSIS BLD QL: SLIGHT — SIGNIFICANT CHANGE UP
APTT BLD: 30.2 SEC — SIGNIFICANT CHANGE UP (ref 27.5–36.3)
AST SERPL-CCNC: 66 U/L — HIGH (ref 15–37)
BASOPHILS # BLD AUTO: 0 K/UL — SIGNIFICANT CHANGE UP (ref 0–0.2)
BASOPHILS NFR BLD AUTO: 0 % — SIGNIFICANT CHANGE UP (ref 0–2)
BILIRUB SERPL-MCNC: 8.2 MG/DL — HIGH (ref 0.2–1.2)
BUN SERPL-MCNC: 39 MG/DL — HIGH (ref 7–23)
CALCIUM SERPL-MCNC: 8.3 MG/DL — LOW (ref 8.5–10.1)
CHLORIDE SERPL-SCNC: 107 MMOL/L — SIGNIFICANT CHANGE UP (ref 96–108)
CO2 SERPL-SCNC: 22 MMOL/L — SIGNIFICANT CHANGE UP (ref 22–31)
CREAT SERPL-MCNC: 1.68 MG/DL — HIGH (ref 0.5–1.3)
DIR ANTIGLOB POLYSPECIFIC INTERPRETATION: SIGNIFICANT CHANGE UP
EBV EA AB SER IA-ACNC: <5 U/ML — SIGNIFICANT CHANGE UP
EBV EA AB TITR SER IF: POSITIVE
EBV EA IGG SER-ACNC: NEGATIVE — SIGNIFICANT CHANGE UP
EBV NA IGG SER IA-ACNC: >600 U/ML — HIGH
EBV PATRN SPEC IB-IMP: SIGNIFICANT CHANGE UP
EBV VCA IGG AVIDITY SER QL IA: POSITIVE
EBV VCA IGM SER IA-ACNC: <10 U/ML — SIGNIFICANT CHANGE UP
EBV VCA IGM SER IA-ACNC: >750 U/ML — HIGH
EBV VCA IGM TITR FLD: NEGATIVE — SIGNIFICANT CHANGE UP
EOSINOPHIL # BLD AUTO: 0.07 K/UL — SIGNIFICANT CHANGE UP (ref 0–0.5)
EOSINOPHIL NFR BLD AUTO: 1 % — SIGNIFICANT CHANGE UP (ref 0–6)
FIBRINOGEN PPP-MCNC: 165 MG/DL — LOW (ref 350–510)
GLUCOSE SERPL-MCNC: 113 MG/DL — HIGH (ref 70–99)
HCT VFR BLD CALC: 18 % — CRITICAL LOW (ref 39–50)
HGB BLD-MCNC: 5.9 G/DL — CRITICAL LOW (ref 13–17)
HYPOCHROMIA BLD QL: SLIGHT — SIGNIFICANT CHANGE UP
IGA FLD-MCNC: 1891 MG/DL — HIGH (ref 84–499)
IGG FLD-MCNC: 1576 MG/DL — SIGNIFICANT CHANGE UP (ref 610–1660)
IGM SERPL-MCNC: 188 MG/DL — SIGNIFICANT CHANGE UP (ref 35–242)
INR BLD: 1.85 RATIO — HIGH (ref 0.88–1.16)
KAPPA LC SER QL IFE: 14.11 MG/DL — HIGH (ref 0.33–1.94)
KAPPA/LAMBDA FREE LIGHT CHAIN RATIO, SERUM: 1.37 RATIO — SIGNIFICANT CHANGE UP (ref 0.26–1.65)
LAMBDA LC SER QL IFE: 10.33 MG/DL — HIGH (ref 0.57–2.63)
LDH SERPL L TO P-CCNC: 360 U/L — HIGH (ref 50–242)
LYMPHOCYTES # BLD AUTO: 0.83 K/UL — LOW (ref 1–3.3)
LYMPHOCYTES # BLD AUTO: 12 % — LOW (ref 13–44)
MANUAL SMEAR VERIFICATION: SIGNIFICANT CHANGE UP
MCHC RBC-ENTMCNC: 30.9 PG — SIGNIFICANT CHANGE UP (ref 27–34)
MCHC RBC-ENTMCNC: 32.8 GM/DL — SIGNIFICANT CHANGE UP (ref 32–36)
MCV RBC AUTO: 94.2 FL — SIGNIFICANT CHANGE UP (ref 80–100)
MICROCYTES BLD QL: SLIGHT — SIGNIFICANT CHANGE UP
MONOCYTES # BLD AUTO: 1.03 K/UL — HIGH (ref 0–0.9)
MONOCYTES NFR BLD AUTO: 15 % — HIGH (ref 2–14)
NEUTROPHILS # BLD AUTO: 4.88 K/UL — SIGNIFICANT CHANGE UP (ref 1.8–7.4)
NEUTROPHILS NFR BLD AUTO: 71 % — SIGNIFICANT CHANGE UP (ref 43–77)
NRBC # BLD: 0 /100 — SIGNIFICANT CHANGE UP (ref 0–0)
NRBC # BLD: SIGNIFICANT CHANGE UP /100 WBCS (ref 0–0)
PLAT MORPH BLD: NORMAL — SIGNIFICANT CHANGE UP
PLATELET # BLD AUTO: 132 K/UL — LOW (ref 150–400)
POLYCHROMASIA BLD QL SMEAR: SLIGHT — SIGNIFICANT CHANGE UP
POTASSIUM SERPL-MCNC: 3.9 MMOL/L — SIGNIFICANT CHANGE UP (ref 3.5–5.3)
POTASSIUM SERPL-SCNC: 3.9 MMOL/L — SIGNIFICANT CHANGE UP (ref 3.5–5.3)
PROT SERPL-MCNC: 6.5 GM/DL — SIGNIFICANT CHANGE UP (ref 6–8.3)
PROTHROM AB SERPL-ACNC: 21.1 SEC — HIGH (ref 10–12.9)
RBC # BLD: 1.91 M/UL — LOW (ref 4.2–5.8)
RBC # BLD: 1.91 M/UL — LOW (ref 4.2–5.8)
RBC # FLD: 24.5 % — HIGH (ref 10.3–14.5)
RBC BLD AUTO: ABNORMAL
RETICS #: 152.8 K/UL — HIGH (ref 25–125)
RETICS/RBC NFR: 8.4 % — HIGH (ref 0.5–2.5)
SODIUM SERPL-SCNC: 139 MMOL/L — SIGNIFICANT CHANGE UP (ref 135–145)
VARIANT LYMPHS # BLD: 1 % — SIGNIFICANT CHANGE UP (ref 0–6)
WBC # BLD: 6.88 K/UL — SIGNIFICANT CHANGE UP (ref 3.8–10.5)
WBC # FLD AUTO: 6.88 K/UL — SIGNIFICANT CHANGE UP (ref 3.8–10.5)

## 2019-05-13 PROCEDURE — 73718 MRI LOWER EXTREMITY W/O DYE: CPT | Mod: 26,LT

## 2019-05-13 PROCEDURE — 99233 SBSQ HOSP IP/OBS HIGH 50: CPT

## 2019-05-13 RX ORDER — OXYCODONE AND ACETAMINOPHEN 5; 325 MG/1; MG/1
2 TABLET ORAL EVERY 6 HOURS
Refills: 0 | Status: DISCONTINUED | OUTPATIENT
Start: 2019-05-13 | End: 2019-05-18

## 2019-05-13 RX ORDER — SODIUM CHLORIDE 9 MG/ML
1000 INJECTION INTRAMUSCULAR; INTRAVENOUS; SUBCUTANEOUS
Refills: 0 | Status: DISCONTINUED | OUTPATIENT
Start: 2019-05-13 | End: 2019-05-15

## 2019-05-13 RX ORDER — OXYCODONE AND ACETAMINOPHEN 5; 325 MG/1; MG/1
1 TABLET ORAL EVERY 4 HOURS
Refills: 0 | Status: DISCONTINUED | OUTPATIENT
Start: 2019-05-13 | End: 2019-05-13

## 2019-05-13 RX ORDER — FUROSEMIDE 40 MG
20 TABLET ORAL DAILY
Refills: 0 | Status: DISCONTINUED | OUTPATIENT
Start: 2019-05-13 | End: 2019-05-15

## 2019-05-13 RX ADMIN — Medication 1 MILLIGRAM(S): at 13:47

## 2019-05-13 RX ADMIN — Medication 100 MILLIGRAM(S): at 05:43

## 2019-05-13 RX ADMIN — CHLORHEXIDINE GLUCONATE 1 APPLICATION(S): 213 SOLUTION TOPICAL at 08:27

## 2019-05-13 RX ADMIN — Medication 25 MILLIGRAM(S): at 05:53

## 2019-05-13 RX ADMIN — PANTOPRAZOLE SODIUM 40 MILLIGRAM(S): 20 TABLET, DELAYED RELEASE ORAL at 17:36

## 2019-05-13 RX ADMIN — Medication 25 MILLIGRAM(S): at 17:36

## 2019-05-13 RX ADMIN — Medication 1 TABLET(S): at 13:46

## 2019-05-13 RX ADMIN — Medication 25 MILLIGRAM(S): at 13:47

## 2019-05-13 RX ADMIN — SODIUM CHLORIDE 150 MILLILITER(S): 9 INJECTION INTRAMUSCULAR; INTRAVENOUS; SUBCUTANEOUS at 17:36

## 2019-05-13 RX ADMIN — PANTOPRAZOLE SODIUM 40 MILLIGRAM(S): 20 TABLET, DELAYED RELEASE ORAL at 05:52

## 2019-05-13 NOTE — PROGRESS NOTE ADULT - SUBJECTIVE AND OBJECTIVE BOX
Patient is a 54y old  Male who presents with a chief complaint of anemia/ (13 May 2019 16:16)      HPI:  54 year old M HTN and ETOH abuse brought in by wife for generalized weakness and dark urine.  Wife reports patient has been having non-bloody non-bilious vomiting last week which stopped his drinking on Friday 5/3.  Wife also notes he has been having easy bruising and L leg swelling over the same amount of time.  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  Wife subsequently added that he has been having weakness and fatigue since early March with episodes of gum bleeding going back to early January.  Of note, wife noted that his skin color has changed over the past week.  Pt denies fevers, chills, eye pain vision changes, (08 May 2019 23:01)      INTERVAL HPI/OVERNIGHT EVENTS:  The patient denies melena, hematochezia, hematemesis, nausea, vomiting, abdominal pain, constipation, diarrhea, or change in bowel movements     MEDICATIONS  (STANDING):  chlordiazePOXIDE 25 milliGRAM(s) Oral every 6 hours  chlorhexidine 2% Cloths 1 Application(s) Topical daily  chlorhexidine 4% Liquid 1 Application(s) Topical <User Schedule>  folic acid 1 milliGRAM(s) Oral daily  furosemide   Injectable 20 milliGRAM(s) IV Push daily  multivitamin 1 Tablet(s) Oral daily  pantoprazole  Injectable 40 milliGRAM(s) IV Push every 12 hours  sodium chloride 0.9%. 1000 milliLiter(s) (150 mL/Hr) IV Continuous <Continuous>    MEDICATIONS  (PRN):  LORazepam   Injectable 2 milliGRAM(s) IV Push every 2 hours PRN CIWA-Ar score 8 or greater  polyethylene glycol 3350 17 Gram(s) Oral daily PRN Constipation      FAMILY HISTORY:      Allergies    No Known Allergies    Intolerances        PMH/PSH:  Benign essential HTN        REVIEW OF SYSTEMS:  CONSTITUTIONAL: No fever, weight loss, or fatigue  EYES: No eye pain, visual disturbances, or discharge  ENMT:  No difficulty hearing, tinnitus, vertigo; No sinus or throat pain  NECK: No pain or stiffness  BREASTS: No pain, masses, or nipple discharge  RESPIRATORY: No cough, wheezing, chills or hemoptysis; No shortness of breath  CARDIOVASCULAR: No chest pain, palpitations, dizziness, or leg swelling  GASTROINTESTINAL:  See above  GENITOURINARY: No dysuria, frequency, hematuria, or incontinence  NEUROLOGICAL: No headaches, memory loss, loss of strength, numbness, or tremors  SKIN: No itching, burning, rashes, . Icteric (+)  LYMPH NODES: No enlarged glands  ENDOCRINE: No heat or cold intolerance; No hair loss  MUSCULOSKELETAL: No joint pain or swelling; No muscle, back, or extremity pain  PSYCHIATRIC: No depression, anxiety, mood swings, or difficulty sleeping  HEME/LYMPH: No easy bruising, or bleeding gums  ALLERGY AND IMMUNOLOGIC: No hives or eczema    Vital Signs Last 24 Hrs  T(C): 37.4 (13 May 2019 05:42), Max: 37.4 (13 May 2019 05:42)  T(F): 99.3 (13 May 2019 05:42), Max: 99.3 (13 May 2019 05:42)  HR: 95 (13 May 2019 14:46) (78 - 95)  BP: 105/54 (13 May 2019 14:46) (90/49 - 109/50)  BP(mean): --  RR: 18 (13 May 2019 14:46) (16 - 18)  SpO2: 99% (13 May 2019 14:46) (96% - 100%)    PHYSICAL EXAM:  GENERAL: NAD, well-groomed, well-developed  HEAD:  Atraumatic, Normocephalic  EYES: EOMI, PERRLA, conjunctiva and sclera icteric  NECK: Supple, No JVD, Normal thyroid  NERVOUS SYSTEM:  Alert & Oriented X3, Fair concentration;   CHEST/LUNG: Clear to percussion bilaterally; No rales, rhonchi, wheezing, or rubs  HEART: Regular rate and rhythm; No murmurs, rubs, or gallops  ABDOMEN: Soft, Nontender, Nondistended; Bowel sounds present  EXTREMITIES:  2+ Peripheral Pulses, No clubbing, cyanosis, or edema  LYMPH: No lymphadenopathy noted  SKIN: No rashes or lesions    LAB  05-08 @ 21:53  amylase --   lipase 180                           5.9    6.88  )-----------( 132      ( 13 May 2019 08:01 )             18.0       CBC:  05-13 @ 08:01  WBC 6.88   Hgb 5.9   Hct 18.0   Plts 132  MCV 94.2  05-12 @ 08:31  WBC 7.32   Hgb 6.2   Hct 18.7   Plts 101  MCV 94.0  05-11 @ 10:36  WBC 4.81   Hgb 7.3   Hct 21.8   Plts 90  MCV 92.4  05-11 @ 00:28  WBC 6.45   Hgb 6.6   Hct 19.5   Plts 82  MCV 93.8  05-10 @ 07:17  WBC 7.25   Hgb 5.1   Hct 15.1   Plts 80  MCV 96.8  05-09 @ 18:10  WBC 9.40   Hgb 6.3   Hct 18.5   Plts 89  MCV 97.9  05-09 @ 06:11  WBC 8.89   Hgb 4.3   Hct 13.4   Plts 77  .3  05-08 @ 22:33  WBC 11.16   Hgb 2.4   Hct 8.6   Plts 88  .5  05-08 @ 21:32  WBC 12.38   Hgb 2.9   Hct 10.1   Plts 115  .3      Chemistry:  05-13 @ 08:01  Na+ 139  K+ 3.9  Cl- 107  CO2 22  BUN 39  Cr 1.68     05-12 @ 08:31  Na+ 138  K+ 3.8  Cl- 105  CO2 23  BUN 39  Cr 1.71     05-11 @ 10:36  Na+ 137  K+ 3.8  Cl- 104  CO2 21  BUN 35  Cr 2.08     05-10 @ 07:17  Na+ 141  K+ 3.6  Cl- 108  CO2 22  BUN 36  Cr 2.45     05-09 @ 06:11  Na+ 137  K+ 4.2  Cl- 107  CO2 21  BUN 28  Cr 2.34     05-08 @ 21:32  Na+ 138  K+ 3.8  Cl- 105  CO2 15  BUN 25  Cr 2.57         Glucose, Serum: 113 mg/dL (05-13 @ 08:01)  Glucose, Serum: 108 mg/dL (05-12 @ 08:31)  Glucose, Serum: 170 mg/dL (05-11 @ 10:36)  Glucose, Serum: 107 mg/dL (05-10 @ 07:17)  Glucose, Serum: 118 mg/dL (05-09 @ 06:11)  Glucose, Serum: 118 mg/dL (05-08 @ 21:32)      13 May 2019 08:01    139    |  107    |  39     ----------------------------<  113    3.9     |  22     |  1.68   12 May 2019 08:31    138    |  105    |  39     ----------------------------<  108    3.8     |  23     |  1.71   11 May 2019 10:36    137    |  104    |  35     ----------------------------<  170    3.8     |  21     |  2.08   10 May 2019 07:17    141    |  108    |  36     ----------------------------<  107    3.6     |  22     |  2.45   09 May 2019 06:11    137    |  107    |  28     ----------------------------<  118    4.2     |  21     |  2.34   08 May 2019 21:32    138    |  105    |  25     ----------------------------<  118    3.8     |  15     |  2.57     Ca    8.3        13 May 2019 08:01  Ca    8.2        12 May 2019 08:31  Ca    7.9        11 May 2019 10:36  Ca    7.5        10 May 2019 07:17  Ca    7.6        09 May 2019 06:11  Ca    8.2        08 May 2019 21:32  Phos  2.9       10 May 2019 07:17  Phos  3.4       09 May 2019 06:11  Mg     1.8       10 May 2019 07:17  Mg     1.7       09 May 2019 06:11    TPro  6.5    /  Alb  1.7    /  TBili  8.2    /  DBili  4.12   /  AST  66     /  ALT  11     /  AlkPhos  114    13 May 2019 08:01  TPro  6.7    /  Alb  1.8    /  TBili  8.0    /  DBili  4.11   /  AST  63     /  ALT  13     /  AlkPhos  79     12 May 2019 08:31  TPro  6.9    /  Alb  1.8    /  TBili  8.8    /  DBili  4.47   /  AST  76     /  ALT  12     /  AlkPhos  96     11 May 2019 10:36  TPro  6.1    /  Alb  1.6    /  TBili  6.1    /  DBili  3.17   /  AST  67     /  ALT  13     /  AlkPhos  72     10 May 2019 07:17  TPro  6.2    /  Alb  2.1    /  TBili  x      /  DBili  x      /  AST  x      /  ALT  x      /  AlkPhos  x      09 May 2019 09:16  TPro  6.4    /  Alb  1.6    /  TBili  5.8    /  DBili  x      /  AST  65     /  ALT  13     /  AlkPhos  42     09 May 2019 06:11  TPro  7.4    /  Alb  1.9    /  TBili  6.3    /  DBili  x      /  AST  77     /  ALT  13     /  AlkPhos  55     08 May 2019 21:32      PT/INR - ( 13 May 2019 08:01 )   PT: 21.1 sec;   INR: 1.85 ratio         PTT - ( 13 May 2019 08:01 )  PTT:30.2 sec        CAPILLARY BLOOD GLUCOSE              RADIOLOGY & ADDITIONAL TESTS:    Imaging Personally Reviewed:  [ ] YES  [ ] NO    Consultant(s) Notes Reviewed:  [ ] YES  [ ] NO    Care Discussed with Consultants/Other Providers [ ] YES  [ ] NO

## 2019-05-13 NOTE — PROGRESS NOTE ADULT - PROBLEM SELECTOR PLAN 1
- patient hemolysing, unsure if DIC, platelet count getting better, Simon negative  - ? possible related to mass in Leg - awaiting MRI  - Transfuse as needed  - Suggest Transfer to [facility] for higher level of care  - other work-up pending

## 2019-05-13 NOTE — PROGRESS NOTE ADULT - ASSESSMENT
A/P:  55 yo M w/ LLE hematoma versus mass in distal posterior thigh:  -Recommend MRI w/w/o contrast this morning for further workup, if not able to tolerate contrast, MRI w/o contrast, hopefully will be able to obtain today  -compressive ace  -WBAT/PT/OT  -FU Heme Onc recs for workup of patient coagulopathy  -medical management  -SCDs/Ice  -no acute orthopedic surgical intervention indicated at this time  - patient seen and evaluated with Dr. Drummond at bedside who agrees with above

## 2019-05-13 NOTE — PROGRESS NOTE ADULT - SUBJECTIVE AND OBJECTIVE BOX
Pt S/E at bedside, no acute events overnight, pain controlled, denies CP, SOB, Fevers, Chills.     Vital Signs Last 24 Hrs  T(C): 37.4 (13 May 2019 05:42), Max: 37.4 (13 May 2019 05:42)  T(F): 99.3 (13 May 2019 05:42), Max: 99.3 (13 May 2019 05:42)  HR: 88 (13 May 2019 05:42) (78 - 88)  BP: 90/49 (13 May 2019 05:42) (90/49 - 111/60)  BP(mean): --  RR: 17 (13 May 2019 05:42) (16 - 18)  SpO2: 96% (13 May 2019 05:42) (96% - 100%)    Gen: NAD,    Left Lower Extremity:  Skin intact, diffuse edema from hip to foot 2+ pitting  No pain to palpation of posterior thigh, aging ecchymosis across thigh and shin  +EHL/FHL/TA/GS  SILT L3-S1  +DP/PT Pulses  Compartments soft  No calf TTP B/L

## 2019-05-13 NOTE — PROGRESS NOTE ADULT - SUBJECTIVE AND OBJECTIVE BOX
Patient is a 54y old  Male who presents with a chief complaint of anemia/ (13 May 2019 16:49)    HPI:  54 year old M HTN and ETOH abuse brought in by wife for generalized weakness and dark urine.  Wife reports patient has been having non-bloody non-bilious vomiting last week which stopped his drinking on Friday 5/3.  Wife also notes he has been having easy bruising and L leg swelling over the same amount of time.  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  Wife subsequently added that he has been having weakness and fatigue since early March with episodes of gum bleeding going back to early January.  Of note, wife noted that his skin color has changed over the past week.  Pt denies fevers, chills, eye pain vision changes, (08 May 2019 23:01)    INTERVAL HPI/OVERNIGHT EVENTS: no acute events canidate for possible traansfer     MEDICATIONS  (STANDING):  chlordiazePOXIDE 25 milliGRAM(s) Oral every 6 hours  chlorhexidine 2% Cloths 1 Application(s) Topical daily  chlorhexidine 4% Liquid 1 Application(s) Topical <User Schedule>  folic acid 1 milliGRAM(s) Oral daily  furosemide   Injectable 20 milliGRAM(s) IV Push daily  multivitamin 1 Tablet(s) Oral daily  pantoprazole  Injectable 40 milliGRAM(s) IV Push every 12 hours  sodium chloride 0.9%. 1000 milliLiter(s) (150 mL/Hr) IV Continuous <Continuous>    MEDICATIONS  (PRN):  LORazepam   Injectable 2 milliGRAM(s) IV Push every 2 hours PRN CIWA-Ar score 8 or greater  polyethylene glycol 3350 17 Gram(s) Oral daily PRN Constipation      Allergies    No Known Allergies    Intolerances        REVIEW OF SYSTEMS:  CONSTITUTIONAL: No fever, weight loss, or fatigue  EYES: No eye pain, visual disturbances, or discharge  ENMT:  No difficulty hearing, tinnitus, vertigo; No sinus or throat pain  NECK: No pain or stiffness  BREASTS: No pain, masses, or nipple discharge  RESPIRATORY: No cough, wheezing, chills or hemoptysis; No shortness of breath  CARDIOVASCULAR: No chest pain, palpitations, dizziness, or leg swelling  GASTROINTESTINAL: No abdominal or epigastric pain. No nausea, vomiting, or hematemesis; No diarrhea or constipation. No melena or hematochezia.  GENITOURINARY: No dysuria, frequency, hematuria, or incontinence  NEUROLOGICAL: No headaches, memory loss, loss of strength, numbness, or tremors  SKIN: No itching, burning, rashes, or lesions   LYMPH NODES: No enlarged glands  ENDOCRINE: No heat or cold intolerance; No hair loss  MUSCULOSKELETAL: No joint pain or swelling; No muscle, back, or extremity pain  PSYCHIATRIC: No depression, anxiety, mood swings, or difficulty sleeping  HEME/LYMPH: No easy bruising, or bleeding gums  ALLERGY AND IMMUNOLOGIC: No hives or eczema    Vital Signs Last 24 Hrs  T(C): 37.4 (13 May 2019 05:42), Max: 37.4 (13 May 2019 05:42)  T(F): 99.3 (13 May 2019 05:42), Max: 99.3 (13 May 2019 05:42)  HR: 91 (13 May 2019 17:00) (86 - 95)  BP: 100/51 (13 May 2019 17:00) (90/49 - 105/54)  BP(mean): --  RR: 17 (13 May 2019 17:00) (16 - 18)  SpO2: 100% (13 May 2019 17:00) (96% - 100%)    PHYSICAL EXAM:  GENERAL: NAD, well-groomed, well-developed  HEAD:  Atraumatic, Normocephalic  EYES: EOMI, PERRLA, conjunctiva and sclera clear  ENMT: No tonsillar erythema, exudates, or enlargement; Moist mucous membranes, Good dentition, No lesions  NECK: Supple, No JVD, Normal thyroid  NERVOUS SYSTEM:  Alert & Oriented X3, Good concentration; Motor Strength 5/5 B/L upper and lower extremities; DTRs 2+ intact and symmetric  CHEST/LUNG: Clear to percussion bilaterally; No rales, rhonchi, wheezing, or rubs  HEART: Regular rate and rhythm; No murmurs, rubs, or gallops  ABDOMEN: Soft, Nontender, Nondistended; Bowel sounds present  EXTREMITIES:  large left leg   leg mass possible hematoma LYMPH: No lymphadenopathy noted  SKIN: No rashes or lesions    LABS:                        5.9    6.88  )-----------( 132      ( 13 May 2019 08:01 )             18.0     05-13    139  |  107  |  39<H>  ----------------------------<  113<H>  3.9   |  22  |  1.68<H>    Ca    8.3<L>      13 May 2019 08:01    TPro  6.5  /  Alb  1.7<L>  /  TBili  8.2<H>  /  DBili  4.12<H>  /  AST  66<H>  /  ALT  11<L>  /  AlkPhos  114  05-13    PT/INR - ( 13 May 2019 08:01 )   PT: 21.1 sec;   INR: 1.85 ratio         PTT - ( 13 May 2019 08:01 )  PTT:30.2 sec    CAPILLARY BLOOD GLUCOSE    < from: MR Femur No Cont, Left (05.13.19 @ 12:25) >  Nonspecific intermediate to hyperintense T1 and STIR focus along the   posterior lateral aspect of the left distal femur within the fascial   plane between the biceps femoris muscle and the   semimembranosus/semitendinosus musculature. This may be related to   complex fluid collection such as a hematoma. Superimposed infection   cannot excluded. An underlying mass is considered less likely given the   appearance of the lesion, however evaluation is limited by lack of   intravenous contrast. Follow-up imaging is recommended to ensure   resolution of this collection exclude any underlying lesion.    Nonspecific feathery edema throughout the imaged hamstring, adductor, and   vastus medialis muscles. This may be related to underlying muscle strain   or nonspecific myositis. Extensive subcutaneous edema throughout the   femur.    Hyperintense STIR signal within the femoral diaphyses which may be   related to mucinous atrophy. No focal T1 signal abnormality to suggest   focal osseous lesion.    < end of copied text >    Imaging Personally Reviewed:  [X ] YES  [ ] NO    Consultant(s) Notes Reviewed:  [X ] YES  [ ] NO    Care Discussed with Consultants/Other Providers X ] YES  [ ] NO

## 2019-05-13 NOTE — PROGRESS NOTE ADULT - SUBJECTIVE AND OBJECTIVE BOX
Lying in bed, feels well    Vital Signs Last 24 Hrs  T(C): 37.4 (13 May 2019 05:42), Max: 37.4 (13 May 2019 05:42)  T(F): 99.3 (13 May 2019 05:42), Max: 99.3 (13 May 2019 05:42)  HR: 88 (13 May 2019 05:42) (78 - 88)  BP: 90/49 (13 May 2019 05:42) (90/49 - 111/60)  BP(mean): --  RR: 17 (13 May 2019 05:42) (16 - 18)  SpO2: 96% (13 May 2019 05:42) (96% - 100%)    PHYSICAL EXAM:    general - AAO x 3  HEENT - Icterus +  CVS - RRR  RS - AE B/L  Abd - soft, NT  Ext - Pulses +        LABS:                        5.9    6.88  )-----------( 132      ( 13 May 2019 08:01 )             18.0     05-13    139  |  107  |  39<H>  ----------------------------<  113<H>  3.9   |  22  |  1.68<H>    Ca    8.3<L>      13 May 2019 08:01    TPro  6.5  /  Alb  1.7<L>  /  TBili  8.2<H>  /  DBili  4.12<H>  /  AST  66<H>  /  ALT  11<L>  /  AlkPhos  114  05-13    PT/INR - ( 13 May 2019 08:01 )   PT: 21.1 sec;   INR: 1.85 ratio         PTT - ( 13 May 2019 08:01 )  PTT:30.2 sec      Culture - Urine (collected 09 May 2019 10:52)  Source: .Urine  Final Report (10 May 2019 15:26):    No growth    Culture - Blood (collected 09 May 2019 01:03)  Source: .Blood  Preliminary Report (10 May 2019 02:01):    No growth to date.    Culture - Blood (collected 09 May 2019 01:03)  Source: .Blood  Preliminary Report (10 May 2019 02:01):    No growth to date.        RADIOLOGY & ADDITIONAL STUDIES:

## 2019-05-13 NOTE — PROGRESS NOTE ADULT - SUBJECTIVE AND OBJECTIVE BOX
Patient complains of dark urine       MEDICATIONS  (STANDING):  chlordiazePOXIDE 25 milliGRAM(s) Oral every 6 hours  chlorhexidine 2% Cloths 1 Application(s) Topical daily  chlorhexidine 4% Liquid 1 Application(s) Topical <User Schedule>  folic acid 1 milliGRAM(s) Oral daily  multivitamin 1 Tablet(s) Oral daily  pantoprazole  Injectable 40 milliGRAM(s) IV Push every 12 hours    MEDICATIONS  (PRN):  LORazepam   Injectable 2 milliGRAM(s) IV Push every 2 hours PRN CIWA-Ar score 8 or greater  polyethylene glycol 3350 17 Gram(s) Oral daily PRN Constipation      05-12-19 @ 07:01  -  05-13-19 @ 07:00  --------------------------------------------------------  IN: 480 mL / OUT: 0 mL / NET: 480 mL      PHYSICAL EXAM:      T(C): 37.4 (05-13-19 @ 05:42), Max: 37.4 (05-13-19 @ 05:42)  HR: 95 (05-13-19 @ 14:46) (78 - 95)  BP: 105/54 (05-13-19 @ 14:46) (90/49 - 109/50)  RR: 18 (05-13-19 @ 14:46) (16 - 18)  SpO2: 99% (05-13-19 @ 14:46) (96% - 100%)  Wt(kg): --  Respiratory: clear anteriorly, decreased BS at bases  Cardiovascular: S1 S2  Gastrointestinal: soft NT ND +BS  Extremities:  1 edema                                    5.9    6.88  )-----------( 132      ( 13 May 2019 08:01 )             18.0     05-13    139  |  107  |  39<H>  ----------------------------<  113<H>  3.9   |  22  |  1.68<H>    Ca    8.3<L>      13 May 2019 08:01    TPro  6.5  /  Alb  1.7<L>  /  TBili  8.2<H>  /  DBili  4.12<H>  /  AST  66<H>  /  ALT  11<L>  /  AlkPhos  114  05-13      LIVER FUNCTIONS - ( 13 May 2019 08:01 )  Alb: 1.7 g/dL / Pro: 6.5 gm/dL / ALK PHOS: 114 U/L / ALT: 11 U/L / AST: 66 U/L / GGT: x           Creatinine Trend: 1.68<--, 1.71<--, 2.08<--, 2.45<--, 2.34<--, 2.57<--  Assessment and Plan:    RICHAR risj for pigment ATN; LDH elevated with dark urine;   Will resume IVF NS at 150 ml hr; add loop diuretic to maintain Na balance;  Discussed with family.

## 2019-05-14 LAB
% GAMMA, URINE: 25.5 % — SIGNIFICANT CHANGE UP
ADAMTS13 ACTIVITY: 48 % — LOW
ADAMTS13-COMMENT: SIGNIFICANT CHANGE UP
ALBUMIN 24H MFR UR ELPH: 14.1 % — SIGNIFICANT CHANGE UP
ALBUMIN SERPL ELPH-MCNC: 1.8 G/DL — LOW (ref 3.3–5)
ALP SERPL-CCNC: 117 U/L — SIGNIFICANT CHANGE UP (ref 40–120)
ALPHA1 GLOB 24H MFR UR ELPH: 28.2 % — SIGNIFICANT CHANGE UP
ALPHA2 GLOB 24H MFR UR ELPH: 23.2 % — SIGNIFICANT CHANGE UP
ALT FLD-CCNC: 12 U/L — SIGNIFICANT CHANGE UP (ref 12–78)
ANION GAP SERPL CALC-SCNC: 12 MMOL/L — SIGNIFICANT CHANGE UP (ref 5–17)
APTT BLD: 30.8 SEC — SIGNIFICANT CHANGE UP (ref 28.5–37)
AST SERPL-CCNC: 64 U/L — HIGH (ref 15–37)
B-GLOBULIN 24H MFR UR ELPH: 9 % — SIGNIFICANT CHANGE UP
BILIRUB SERPL-MCNC: 8.2 MG/DL — HIGH (ref 0.2–1.2)
BUN SERPL-MCNC: 36 MG/DL — HIGH (ref 7–23)
CALCIUM SERPL-MCNC: 8.2 MG/DL — LOW (ref 8.5–10.1)
CHLORIDE SERPL-SCNC: 108 MMOL/L — SIGNIFICANT CHANGE UP (ref 96–108)
CO2 SERPL-SCNC: 21 MMOL/L — LOW (ref 22–31)
CREAT SERPL-MCNC: 1.48 MG/DL — HIGH (ref 0.5–1.3)
CULTURE RESULTS: SIGNIFICANT CHANGE UP
CULTURE RESULTS: SIGNIFICANT CHANGE UP
GLUCOSE SERPL-MCNC: 120 MG/DL — HIGH (ref 70–99)
HCT VFR BLD CALC: 21.9 % — LOW (ref 39–50)
HGB BLD-MCNC: 7.2 G/DL — LOW (ref 13–17)
INTERPRETATION 24H UR IFE-IMP: SIGNIFICANT CHANGE UP
INTERPRETATION 24H UR IFE-IMP: SIGNIFICANT CHANGE UP
M PROTEIN 24H UR ELPH-MRATE: SIGNIFICANT CHANGE UP
MAGNESIUM SERPL-MCNC: 1.7 MG/DL — SIGNIFICANT CHANGE UP (ref 1.6–2.6)
MCHC RBC-ENTMCNC: 30.9 PG — SIGNIFICANT CHANGE UP (ref 27–34)
MCHC RBC-ENTMCNC: 32.9 GM/DL — SIGNIFICANT CHANGE UP (ref 32–36)
MCV RBC AUTO: 94 FL — SIGNIFICANT CHANGE UP (ref 80–100)
NRBC # BLD: 0 /100 WBCS — SIGNIFICANT CHANGE UP (ref 0–0)
PHOSPHATE SERPL-MCNC: 2.9 MG/DL — SIGNIFICANT CHANGE UP (ref 2.5–4.5)
PLATELET # BLD AUTO: 122 K/UL — LOW (ref 150–400)
POTASSIUM SERPL-MCNC: 3.9 MMOL/L — SIGNIFICANT CHANGE UP (ref 3.5–5.3)
POTASSIUM SERPL-SCNC: 3.9 MMOL/L — SIGNIFICANT CHANGE UP (ref 3.5–5.3)
PROT ?TM UR-MCNC: 28 MG/DL — HIGH (ref 0–12)
PROT PATTERN 24H UR ELPH-IMP: SIGNIFICANT CHANGE UP
PROT SERPL-MCNC: 7 GM/DL — SIGNIFICANT CHANGE UP (ref 6–8.3)
RBC # BLD: 2.33 M/UL — LOW (ref 4.2–5.8)
RBC # FLD: 22.5 % — HIGH (ref 10.3–14.5)
SODIUM SERPL-SCNC: 141 MMOL/L — SIGNIFICANT CHANGE UP (ref 135–145)
SPECIMEN SOURCE: SIGNIFICANT CHANGE UP
SPECIMEN SOURCE: SIGNIFICANT CHANGE UP
TOTAL VOLUME - 24 HOUR: SIGNIFICANT CHANGE UP ML
URINE CREATININE CALCULATION: SIGNIFICANT CHANGE UP G/24 H (ref 1–2)
VISC SER: 2.1 — HIGH (ref 1.5–1.9)
WBC # BLD: 6.63 K/UL — SIGNIFICANT CHANGE UP (ref 3.8–10.5)
WBC # FLD AUTO: 6.63 K/UL — SIGNIFICANT CHANGE UP (ref 3.8–10.5)

## 2019-05-14 PROCEDURE — 99233 SBSQ HOSP IP/OBS HIGH 50: CPT

## 2019-05-14 RX ADMIN — Medication 1 TABLET(S): at 12:43

## 2019-05-14 RX ADMIN — Medication 1 MILLIGRAM(S): at 12:43

## 2019-05-14 RX ADMIN — CHLORHEXIDINE GLUCONATE 1 APPLICATION(S): 213 SOLUTION TOPICAL at 05:21

## 2019-05-14 RX ADMIN — PANTOPRAZOLE SODIUM 40 MILLIGRAM(S): 20 TABLET, DELAYED RELEASE ORAL at 05:22

## 2019-05-14 RX ADMIN — Medication 25 MILLIGRAM(S): at 17:01

## 2019-05-14 RX ADMIN — Medication 25 MILLIGRAM(S): at 12:48

## 2019-05-14 RX ADMIN — SODIUM CHLORIDE 150 MILLILITER(S): 9 INJECTION INTRAMUSCULAR; INTRAVENOUS; SUBCUTANEOUS at 17:21

## 2019-05-14 RX ADMIN — PANTOPRAZOLE SODIUM 40 MILLIGRAM(S): 20 TABLET, DELAYED RELEASE ORAL at 17:00

## 2019-05-14 RX ADMIN — Medication 25 MILLIGRAM(S): at 05:22

## 2019-05-14 RX ADMIN — SODIUM CHLORIDE 150 MILLILITER(S): 9 INJECTION INTRAMUSCULAR; INTRAVENOUS; SUBCUTANEOUS at 10:21

## 2019-05-14 RX ADMIN — Medication 25 MILLIGRAM(S): at 00:37

## 2019-05-14 RX ADMIN — SODIUM CHLORIDE 150 MILLILITER(S): 9 INJECTION INTRAMUSCULAR; INTRAVENOUS; SUBCUTANEOUS at 05:22

## 2019-05-14 RX ADMIN — Medication 20 MILLIGRAM(S): at 05:22

## 2019-05-14 RX ADMIN — CHLORHEXIDINE GLUCONATE 1 APPLICATION(S): 213 SOLUTION TOPICAL at 12:49

## 2019-05-14 NOTE — PROGRESS NOTE ADULT - PROBLEM SELECTOR PLAN 2
8.2  ( - ) / 64 / 12 /  117 . Probably secondary to hemolysis Viral studies negative. Check ammonia level

## 2019-05-14 NOTE — PROGRESS NOTE ADULT - SUBJECTIVE AND OBJECTIVE BOX
MRI shows possibility of Hematoma    Vital Signs Last 24 Hrs  T(C): 36.5 (14 May 2019 05:37), Max: 37.4 (13 May 2019 20:00)  T(F): 97.7 (14 May 2019 05:37), Max: 99.3 (13 May 2019 20:00)  HR: 78 (14 May 2019 05:37) (78 - 95)  BP: 110/58 (14 May 2019 05:37) (100/51 - 124/64)  BP(mean): --  RR: 18 (14 May 2019 05:37) (17 - 19)  SpO2: 97% (14 May 2019 05:37) (96% - 100%)    PHYSICAL EXAM:    general - AAO x 3  HEENT - Icterus +  CVS - RRR  RS - AE B/L  Abd - soft, NT  Ext - Pulses +        LABS:                        7.2    6.63  )-----------( 122      ( 14 May 2019 07:01 )             21.9     05-14    141  |  108  |  36<H>  ----------------------------<  120<H>  3.9   |  21<L>  |  1.48<H>    Ca    8.2<L>      14 May 2019 07:01  Phos  2.9     05-14  Mg     1.7     05-14    TPro  7.0  /  Alb  1.8<L>  /  TBili  8.2<H>  /  DBili  x   /  AST  64<H>  /  ALT  12  /  AlkPhos  117  05-14    PT/INR - ( 13 May 2019 08:01 )   PT: 21.1 sec;   INR: 1.85 ratio         PTT - ( 14 May 2019 07:01 )  PTT:30.8 sec      Culture - Urine (collected 09 May 2019 10:52)  Source: .Urine  Final Report (10 May 2019 15:26):    No growth    Culture - Blood (collected 09 May 2019 01:03)  Source: .Blood  Final Report (14 May 2019 02:00):    No growth at 5 days.    Culture - Blood (collected 09 May 2019 01:03)  Source: .Blood  Final Report (14 May 2019 02:00):    No growth at 5 days.        RADIOLOGY & ADDITIONAL STUDIES:

## 2019-05-14 NOTE — PROGRESS NOTE ADULT - ASSESSMENT
54M with possible LLE hematoma vs mass of L posterior thigh  MRI without contrast performed yesterday, however Pt was unable to tolerated contrast  Recommend repeat MRI with contrast to better delineate mass in posterior L thigh if kidney function allows  IF contrast MRI unable to be obtained, recommend repeat MRI with/without contrast in 4 weeks to re-evaluate posterior thigh mass  WBAT LLE  PT/OT  DVT ppx with SCDs 2/2 anemia  FU heme/onc recommendations for anemia/coagulopathy workup  Cont care per primary team  No orthopedic surgery intervention planned at this time  FU with Dr. Drummond in 2 weeks following d/c from hospital/rehab  Discussed with attending  If Patient LLE condition worsens or Pt develops neurologic deficits to LLE, please make orthopedic team aware

## 2019-05-14 NOTE — PROGRESS NOTE ADULT - PROBLEM SELECTOR PLAN 1
- MRI shows possibility of large Hematoma, patient may be bleeding into leg - orthopaedic follow up  - Follow CBC, Hepatic function panel, Coags, Fibrinogen  - Simon negative, will repeat - steroids held  - LDH , retic count - MRI shows possibility of large Hematoma, patient may be bleeding into leg - orthopaedic follow up  - Follow CBC, Hepatic function panel, Coags, Fibrinogen  - Simon negative, will repeat - steroids held  - LDH , retic count  - recommned transfer to [facility] - MRI shows possibility of large Hematoma, patient may be bleeding into leg - orthopaedic follow up  - Follow CBC, Hepatic function panel, Coags, Fibrinogen  - Simon negative, will repeat - steroids held  - LDH , retic count  - recommned transfer to [facility]  - consider repeat MRI if hematomoa getting bigger may need IR Intervention

## 2019-05-14 NOTE — PROGRESS NOTE ADULT - SUBJECTIVE AND OBJECTIVE BOX
HPI:  54 year old M HTN and ETOH abuse brought in by wife for generalized weakness and dark urine.  Wife reports patient has been having non-bloody non-bilious vomiting last week which stopped his drinking on Friday 5/3.  Wife also notes he has been having easy bruising and L leg swelling over the same amount of time.  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  Wife subsequently added that he has been having weakness and fatigue since early March with episodes of gum bleeding going back to early January.  Of note, wife noted that his skin color has changed over the past week.  Pt denies fevers, chills, eye pain vision changes, (08 May 2019 23:01)      Patient is a 54y old  Male who presents with a chief complaint of anemia/ (14 May 2019 15:19)      INTERVAL HPI/OVERNIGHT EVENTS: transfused two units feels better     MEDICATIONS  (STANDING):  chlordiazePOXIDE 5 milliGRAM(s) Oral every 12 hours  chlorhexidine 2% Cloths 1 Application(s) Topical daily  chlorhexidine 4% Liquid 1 Application(s) Topical <User Schedule>  folic acid 1 milliGRAM(s) Oral daily  furosemide   Injectable 20 milliGRAM(s) IV Push daily  multivitamin 1 Tablet(s) Oral daily  pantoprazole  Injectable 40 milliGRAM(s) IV Push every 12 hours  sodium chloride 0.9%. 1000 milliLiter(s) (150 mL/Hr) IV Continuous <Continuous>    MEDICATIONS  (PRN):  LORazepam   Injectable 2 milliGRAM(s) IV Push every 2 hours PRN CIWA-Ar score 8 or greater  oxyCODONE    5 mG/acetaminophen 325 mG 1 Tablet(s) Oral every 4 hours PRN Moderate Pain (4 - 6)  oxyCODONE    5 mG/acetaminophen 325 mG 2 Tablet(s) Oral every 6 hours PRN Severe Pain (7 - 10)  polyethylene glycol 3350 17 Gram(s) Oral daily PRN Constipation      Allergies    No Known Allergies    Intolerances        REVIEW OF SYSTEMS:  CONSTITUTIONAL: No fever, weight loss, or fatigue  EYES: No eye pain, visual disturbances, or discharge  ENMT:  No difficulty hearing, tinnitus, vertigo; No sinus or throat pain  NECK: No pain or stiffness  CARDIOVASCULAR: No chest pain, palpitations, dizziness, or leg swelling  GASTROINTESTINAL: No abdominal or epigastric pain. No nausea, vomiting, or hematemesis; No diarrhea or constipation. No melena or hematochezia.  GENITOURINARY: No dysuria, frequency, hematuria, or incontinence  NEUROLOGICAL: No headaches, memory loss, loss of strength, numbness, or tremors  SKIN: No itching, burning, rashes, or lesions   LYMPH NODES: No enlarged glands  ENDOCRINE: No heat or cold intolerance; No hair loss  MUSCULOSKELETAL: No joint pain or swelling; No muscle, back, or extremity pain  PSYCHIATRIC: No depression, anxiety, mood swings, or difficulty sleeping  HEME/LYMPH: No easy bruising, or bleeding gums  ALLERGY AND IMMUNOLOGIC: No hives or eczema    Vital Signs Last 24 Hrs  T(C): 36.1 (14 May 2019 12:02), Max: 37.4 (13 May 2019 20:00)  T(F): 97 (14 May 2019 12:02), Max: 99.3 (13 May 2019 20:00)  HR: 76 (14 May 2019 12:02) (76 - 90)  BP: 103/60 (14 May 2019 12:02) (103/60 - 124/64)  BP(mean): --  RR: 16 (14 May 2019 12:02) (16 - 19)  SpO2: 100% (14 May 2019 12:02) (96% - 100%)    PHYSICAL EXAM:  GENERAL: NAD, well-groomed, well-developed  HEAD:  Atraumatic, Normocephalic  EYES: EOMI, PERRLA, conjunctiva and sclera clear  ENMT: No tonsillar erythema, exudates, or enlargement; Moist mucous membranes, Good dentition, No lesions  NECK: Supple, No JVD, Normal thyroid  NERVOUS SYSTEM:  Alert & Oriented X3, Good concentration; Motor Strength 5/5 B/L upper and lower extremities; DTRs 2+ intact and symmetric  CHEST/LUNG: Clear to percussion bilaterally; No rales, rhonchi, wheezing, or rubs  HEART: Regular rate and rhythm; No murmurs, rubs, or gallops  ABDOMEN: Soft, Nontender, Nondistended; Bowel sounds present distended   EXTREMITIES:  2+ Peripheral Pulses, No clubbing, cyanosis, or edema  LYMPH: No lymphadenopathy noted  SKIN: No rashes or lesions    LABS:                        7.2    6.63  )-----------( 122      ( 14 May 2019 07:01 )             21.9     05-14    141  |  108  |  36<H>  ----------------------------<  120<H>  3.9   |  21<L>  |  1.48<H>    Ca    8.2<L>      14 May 2019 07:01  Phos  2.9     05-14  Mg     1.7     05-14    TPro  7.0  /  Alb  1.8<L>  /  TBili  8.2<H>  /  DBili  x   /  AST  64<H>  /  ALT  12  /  AlkPhos  117  05-14    PT/INR - ( 13 May 2019 08:01 )   PT: 21.1 sec;   INR: 1.85 ratio         PTT - ( 14 May 2019 07:01 )  PTT:30.8 sec    CAPILLARY BLOOD GLUCOSE          RADIOLOGY & ADDITIONAL TESTS:  < from: MR Femur No Cont, Left (05.13.19 @ 12:25) >  Impression:    Nonspecific intermediate to hyperintense T1 and STIR focus along the   posterior lateral aspect of the left distal femur within the fascial   plane between the biceps femoris muscle and the   semimembranosus/semitendinosus musculature. This may be related to   complex fluid collection such as a hematoma. Superimposed infection   cannot excluded. An underlying mass is considered less likely given the   appearance of the lesion, however evaluation is limited by lack of   intravenous contrast. Follow-up imaging is recommended to ensure   resolution of this collection exclude any underlying lesion.    Nonspecific feathery edema throughout the imaged hamstring, adductor, and   vastus medialis muscles. This may be related to underlying muscle strain   or nonspecific myositis. Extensive subcutaneous edema throughout the   femur.    Hyperintense STIR signal within the femoral diaphyses which may be   related to mucinous atrophy. No focal T1 signal abnormality to suggest   focal osseous lesion.    < end of copied text >  < from: MR Femur No Cont, Left (05.13.19 @ 12:25) >  Impression:    Nonspecific intermediate to hyperintense T1 and STIR focus along the   posterior lateral aspect of the left distal femur within the fascial   plane between the biceps femoris muscle and the   semimembranosus/semitendinosus musculature. This may be related to   complex fluid collection such as a hematoma. Superimposed infection   cannot excluded. An underlying mass is considered less likely given the   appearance of the lesion, however evaluation is limited by lack of   intravenous contrast. Follow-up imaging is recommended to ensure   resolution of this collection exclude any underlying lesion.    Nonspecific feathery edema throughout the imaged hamstring, adductor, and   vastus medialis muscles. This may be related to underlying muscle strain   or nonspecific myositis. Extensive subcutaneous edema throughout the   femur.    Hyperintense STIR signal within the femoral diaphyses which may be   related to mucinous atrophy. No focal T1 signal abnormality to suggest   focal osseous lesion.    < end of copied text >    Imaging Personally Reviewed:  [ ] YES  [ ] NO    Consultant(s) Notes Reviewed:  [ ] YES  [ ] NO    Care Discussed with Consultants/Other Providers [ ] YES  [ ] NO

## 2019-05-14 NOTE — PROGRESS NOTE ADULT - SUBJECTIVE AND OBJECTIVE BOX
Patient feels well no new complaints today. Urine not as dark;    MEDICATIONS  (STANDING):  chlordiazePOXIDE 25 milliGRAM(s) Oral every 6 hours  chlorhexidine 2% Cloths 1 Application(s) Topical daily  chlorhexidine 4% Liquid 1 Application(s) Topical <User Schedule>  folic acid 1 milliGRAM(s) Oral daily  furosemide   Injectable 20 milliGRAM(s) IV Push daily  multivitamin 1 Tablet(s) Oral daily  pantoprazole  Injectable 40 milliGRAM(s) IV Push every 12 hours  sodium chloride 0.9%. 1000 milliLiter(s) (150 mL/Hr) IV Continuous <Continuous>    MEDICATIONS  (PRN):  LORazepam   Injectable 2 milliGRAM(s) IV Push every 2 hours PRN CIWA-Ar score 8 or greater  oxyCODONE    5 mG/acetaminophen 325 mG 1 Tablet(s) Oral every 4 hours PRN Moderate Pain (4 - 6)  oxyCODONE    5 mG/acetaminophen 325 mG 2 Tablet(s) Oral every 6 hours PRN Severe Pain (7 - 10)  polyethylene glycol 3350 17 Gram(s) Oral daily PRN Constipation      05-13-19 @ 07:01  -  05-14-19 @ 07:00  --------------------------------------------------------  IN: 2673 mL / OUT: 600 mL / NET: 2073 mL      PHYSICAL EXAM:      T(C): 36.1 (05-14-19 @ 12:02), Max: 37.4 (05-13-19 @ 20:00)  HR: 76 (05-14-19 @ 12:02) (76 - 91)  BP: 103/60 (05-14-19 @ 12:02) (100/51 - 124/64)  RR: 16 (05-14-19 @ 12:02) (16 - 19)  SpO2: 100% (05-14-19 @ 12:02) (96% - 100%)  Wt(kg): --  Respiratory: clear anteriorly, decreased BS at bases  Cardiovascular: S1 S2  Gastrointestinal: soft NT ND +BS  Extremities:  1 edema                                    7.2    6.63  )-----------( 122      ( 14 May 2019 07:01 )             21.9     05-14    141  |  108  |  36<H>  ----------------------------<  120<H>  3.9   |  21<L>  |  1.48<H>    Ca    8.2<L>      14 May 2019 07:01  Phos  2.9     05-14  Mg     1.7     05-14    TPro  7.0  /  Alb  1.8<L>  /  TBili  8.2<H>  /  DBili  x   /  AST  64<H>  /  ALT  12  /  AlkPhos  117  05-14      LIVER FUNCTIONS - ( 14 May 2019 07:01 )  Alb: 1.8 g/dL / Pro: 7.0 gm/dL / ALK PHOS: 117 U/L / ALT: 12 U/L / AST: 64 U/L / GGT: x           Creatinine Trend: 1.48<--, 1.68<--, 1.71<--, 2.08<--, 2.45<--, 2.34<--    Assessment and Plan:    RICHAR risk  for pigment ATN; LDH elevated with dark urine;   Resumed IVF NS at 150 ml hr; add loop diuretic to maintain Na balance;  Discussed with patient.

## 2019-05-14 NOTE — PROGRESS NOTE ADULT - SUBJECTIVE AND OBJECTIVE BOX
Patient is a 54y old  Male who presents with a chief complaint of anemia/ (14 May 2019 10:29)      HPI:  54 year old M HTN and ETOH abuse brought in by wife for generalized weakness and dark urine.  Wife reports patient has been having non-bloody non-bilious vomiting last week which stopped his drinking on Friday 5/3.  Wife also notes he has been having easy bruising and L leg swelling over the same amount of time.  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  Wife subsequently added that he has been having weakness and fatigue since early March with episodes of gum bleeding going back to early January.  Of note, wife noted that his skin color has changed over the past week.  Pt denies fevers, chills, eye pain vision changes, (08 May 2019 23:01)      INTERVAL HPI/OVERNIGHT EVENTS:  Slightly more lethargic today. The patient denies melena, hematochezia, hematemesis, nausea, vomiting, abdominal pain, constipation, diarrhea, or change in bowel movements     MEDICATIONS  (STANDING):  chlordiazePOXIDE 25 milliGRAM(s) Oral every 6 hours  chlorhexidine 2% Cloths 1 Application(s) Topical daily  chlorhexidine 4% Liquid 1 Application(s) Topical <User Schedule>  folic acid 1 milliGRAM(s) Oral daily  furosemide   Injectable 20 milliGRAM(s) IV Push daily  multivitamin 1 Tablet(s) Oral daily  pantoprazole  Injectable 40 milliGRAM(s) IV Push every 12 hours  sodium chloride 0.9%. 1000 milliLiter(s) (150 mL/Hr) IV Continuous <Continuous>    MEDICATIONS  (PRN):  LORazepam   Injectable 2 milliGRAM(s) IV Push every 2 hours PRN CIWA-Ar score 8 or greater  oxyCODONE    5 mG/acetaminophen 325 mG 1 Tablet(s) Oral every 4 hours PRN Moderate Pain (4 - 6)  oxyCODONE    5 mG/acetaminophen 325 mG 2 Tablet(s) Oral every 6 hours PRN Severe Pain (7 - 10)  polyethylene glycol 3350 17 Gram(s) Oral daily PRN Constipation      FAMILY HISTORY:      Allergies    No Known Allergies    Intolerances        PMH/PSH:  Benign essential HTN        REVIEW OF SYSTEMS:  CONSTITUTIONAL: No fever, weight loss, or fatigue  EYES: No eye pain, visual disturbances, or discharge  ENMT:  No difficulty hearing, tinnitus, vertigo; No sinus or throat pain  NECK: No pain or stiffness  BREASTS: No pain, masses, or nipple discharge  RESPIRATORY: No cough, wheezing, chills or hemoptysis; No shortness of breath  CARDIOVASCULAR: No chest pain, palpitations, dizziness, or leg swelling  GASTROINTESTINAL: See above  GENITOURINARY: No dysuria, frequency, hematuria, or incontinence  NEUROLOGICAL: No headaches,  numbness, or tremors  SKIN: No itching, burning, rashes, or lesions   LYMPH NODES: No enlarged glands  ENDOCRINE: No heat or cold intolerance; No hair loss  MUSCULOSKELETAL: No joint pain or swelling; No muscle, back, or extremity pain  PSYCHIATRIC: No depression, anxiety, mood swings, or difficulty sleeping  HEME/LYMPH: No easy bruising, or bleeding gums  ALLERGY AND IMMUNOLOGIC: No hives or eczema    Vital Signs Last 24 Hrs  T(C): 36.1 (14 May 2019 12:02), Max: 37.4 (13 May 2019 20:00)  T(F): 97 (14 May 2019 12:02), Max: 99.3 (13 May 2019 20:00)  HR: 76 (14 May 2019 12:02) (76 - 95)  BP: 103/60 (14 May 2019 12:02) (100/51 - 124/64)  BP(mean): --  RR: 16 (14 May 2019 12:02) (16 - 19)  SpO2: 100% (14 May 2019 12:02) (96% - 100%)    PHYSICAL EXAM:  GENERAL: NAD, well-groomed, well-developed  HEAD:  Atraumatic, Normocephalic  EYES: EOMI, PERRLA, conjunctiva and sclera clear  ENMT: No tonsillar erythema, exudates, or enlargement; Moist mucous membranes, Good dentition, No lesions  NECK: Supple, No JVD, Normal thyroid  NERVOUS SYSTEM:  Alert & Oriented X 2, Fair concentration; No asterixis  CHEST/LUNG: Clear to percussion bilaterally; No rales, rhonchi, wheezing, or rubs  HEART: Regular rate and rhythm; No murmurs, rubs, or gallops  ABDOMEN: Soft, Nontender, Nondistended; Bowel sounds present  EXTREMITIES:  2+ Peripheral Pulses, No clubbing, cyanosis, or edema  LYMPH: No lymphadenopathy noted  SKIN: No rashes or lesions    LAB                          7.2    6.63  )-----------( 122      ( 14 May 2019 07:01 )             21.9       CBC:  05-14 @ 07:01  WBC 6.63   Hgb 7.2   Hct 21.9   Plts 122  MCV 94.0  05-13 @ 08:01  WBC 6.88   Hgb 5.9   Hct 18.0   Plts 132  MCV 94.2  05-12 @ 08:31  WBC 7.32   Hgb 6.2   Hct 18.7   Plts 101  MCV 94.0  05-11 @ 10:36  WBC 4.81   Hgb 7.3   Hct 21.8   Plts 90  MCV 92.4  05-11 @ 00:28  WBC 6.45   Hgb 6.6   Hct 19.5   Plts 82  MCV 93.8  05-10 @ 07:17  WBC 7.25   Hgb 5.1   Hct 15.1   Plts 80  MCV 96.8  05-09 @ 18:10  WBC 9.40   Hgb 6.3   Hct 18.5   Plts 89  MCV 97.9  05-09 @ 06:11  WBC 8.89   Hgb 4.3   Hct 13.4   Plts 77  .3  05-08 @ 22:33  WBC 11.16   Hgb 2.4   Hct 8.6   Plts 88  .5  05-08 @ 21:32  WBC 12.38   Hgb 2.9   Hct 10.1   Plts 115  .3      Chemistry:  05-14 @ 07:01  Na+ 141  K+ 3.9  Cl- 108  CO2 21  BUN 36  Cr 1.48     05-13 @ 08:01  Na+ 139  K+ 3.9  Cl- 107  CO2 22  BUN 39  Cr 1.68     05-12 @ 08:31  Na+ 138  K+ 3.8  Cl- 105  CO2 23  BUN 39  Cr 1.71     05-11 @ 10:36  Na+ 137  K+ 3.8  Cl- 104  CO2 21  BUN 35  Cr 2.08     05-10 @ 07:17  Na+ 141  K+ 3.6  Cl- 108  CO2 22  BUN 36  Cr 2.45     05-09 @ 06:11  Na+ 137  K+ 4.2  Cl- 107  CO2 21  BUN 28  Cr 2.34     05-08 @ 21:32  Na+ 138  K+ 3.8  Cl- 105  CO2 15  BUN 25  Cr 2.57         Glucose, Serum: 120 mg/dL (05-14 @ 07:01)  Glucose, Serum: 113 mg/dL (05-13 @ 08:01)  Glucose, Serum: 108 mg/dL (05-12 @ 08:31)  Glucose, Serum: 170 mg/dL (05-11 @ 10:36)  Glucose, Serum: 107 mg/dL (05-10 @ 07:17)  Glucose, Serum: 118 mg/dL (05-09 @ 06:11)  Glucose, Serum: 118 mg/dL (05-08 @ 21:32)      14 May 2019 07:01    141    |  108    |  36     ----------------------------<  120    3.9     |  21     |  1.48   13 May 2019 08:01    139    |  107    |  39     ----------------------------<  113    3.9     |  22     |  1.68   12 May 2019 08:31    138    |  105    |  39     ----------------------------<  108    3.8     |  23     |  1.71   11 May 2019 10:36    137    |  104    |  35     ----------------------------<  170    3.8     |  21     |  2.08   10 May 2019 07:17    141    |  108    |  36     ----------------------------<  107    3.6     |  22     |  2.45   09 May 2019 06:11    137    |  107    |  28     ----------------------------<  118    4.2     |  21     |  2.34   08 May 2019 21:32    138    |  105    |  25     ----------------------------<  118    3.8     |  15     |  2.57     Ca    8.2        14 May 2019 07:01  Ca    8.3        13 May 2019 08:01  Ca    8.2        12 May 2019 08:31  Ca    7.9        11 May 2019 10:36  Ca    7.5        10 May 2019 07:17  Ca    7.6        09 May 2019 06:11  Ca    8.2        08 May 2019 21:32  Phos  2.9       14 May 2019 07:01  Phos  2.9       10 May 2019 07:17  Phos  3.4       09 May 2019 06:11  Mg     1.7       14 May 2019 07:01  Mg     1.8       10 May 2019 07:17  Mg     1.7       09 May 2019 06:11    TPro  7.0    /  Alb  1.8    /  TBili  8.2    /  DBili  x      /  AST  64     /  ALT  12     /  AlkPhos  117    14 May 2019 07:01  TPro  6.5    /  Alb  1.7    /  TBili  8.2    /  DBili  4.12   /  AST  66     /  ALT  11     /  AlkPhos  114    13 May 2019 08:01  TPro  6.7    /  Alb  1.8    /  TBili  8.0    /  DBili  4.11   /  AST  63     /  ALT  13     /  AlkPhos  79     12 May 2019 08:31  TPro  6.9    /  Alb  1.8    /  TBili  8.8    /  DBili  4.47   /  AST  76     /  ALT  12     /  AlkPhos  96     11 May 2019 10:36  TPro  6.1    /  Alb  1.6    /  TBili  6.1    /  DBili  3.17   /  AST  67     /  ALT  13     /  AlkPhos  72     10 May 2019 07:17  TPro  6.2    /  Alb  2.1    /  TBili  x      /  DBili  x      /  AST  x      /  ALT  x      /  AlkPhos  x      09 May 2019 09:16  TPro  6.4    /  Alb  1.6    /  TBili  5.8    /  DBili  x      /  AST  65     /  ALT  13     /  AlkPhos  42     09 May 2019 06:11      PT/INR - ( 13 May 2019 08:01 )   PT: 21.1 sec;   INR: 1.85 ratio         PTT - ( 14 May 2019 07:01 )  PTT:30.8 sec        CAPILLARY BLOOD GLUCOSE              RADIOLOGY & ADDITIONAL TESTS:    Imaging Personally Reviewed:  [ ] YES  [ ] NO    Consultant(s) Notes Reviewed:  [ ] YES  [ ] NO    Care Discussed with Consultants/Other Providers [ ] YES  [ ] NO

## 2019-05-14 NOTE — PROGRESS NOTE ADULT - ASSESSMENT
3 yo BM with h/o HTN and ETOH abuse brought in by wife for generalized weakness and dark urine; pt  found to have lactic acidosis (8.8), possible RICHAR with Hb 2.9 and repeat 2.4. In the ER pt was hemodynamically stable    ID: No obvious infection, stop abx     CVS: Lactate has normalized    HEME: patient now in 9 units CBC holding concern for hemolysis heme on board but may just be      FEN: Po diet/ Daily Thiamine, MVI and Folate      Renal: Follow BUN/Cr and UO  Suspect CKD 3-4, Pt with ATN     Neuro/Psych:  Librium taper  with CIWA Benzo coverage    will get MRI with contrast may need IR intervention will discuss case with IR pending results

## 2019-05-14 NOTE — PROGRESS NOTE ADULT - SUBJECTIVE AND OBJECTIVE BOX
Pt S/E at bedside, no acute events overnight, pain controlled. No complaints this morning, Pt still feels he is improving. Has been ambulating with minimal pain in L leg.    Vital Signs Last 24 Hrs  T(C): 36.5 (14 May 2019 05:37), Max: 37.4 (13 May 2019 20:00)  T(F): 97.7 (14 May 2019 05:37), Max: 99.3 (13 May 2019 20:00)  HR: 78 (14 May 2019 05:37) (78 - 95)  BP: 110/58 (14 May 2019 05:37) (100/51 - 124/64)  BP(mean): --  RR: 18 (14 May 2019 05:37) (17 - 19)  SpO2: 97% (14 May 2019 05:37) (96% - 100%)    Gen: NAD, AAOx3    Left Lower Extremity:  Skin intact  Diffuse pitting edema from hip to foot   No pain to palpation of posterior thigh  +ecchymosis across thigh and shin  +EHL/FHL/TA/GS  SILT L3-S1  +DP/PT Pulses  Compartments soft  No calf TTP B/L

## 2019-05-15 LAB
ALBUMIN SERPL ELPH-MCNC: 1.9 G/DL — LOW (ref 3.3–5)
ALP SERPL-CCNC: 127 U/L — HIGH (ref 40–120)
ALT FLD-CCNC: 17 U/L — SIGNIFICANT CHANGE UP (ref 12–78)
ANA TITR SER: NEGATIVE — SIGNIFICANT CHANGE UP
ANION GAP SERPL CALC-SCNC: 9 MMOL/L — SIGNIFICANT CHANGE UP (ref 5–17)
ANISOCYTOSIS BLD QL: SLIGHT — SIGNIFICANT CHANGE UP
APTT BLD: 32 SEC — SIGNIFICANT CHANGE UP (ref 27.5–36.3)
AST SERPL-CCNC: 81 U/L — HIGH (ref 15–37)
BASOPHILS # BLD AUTO: 0 K/UL — SIGNIFICANT CHANGE UP (ref 0–0.2)
BASOPHILS NFR BLD AUTO: 0 % — SIGNIFICANT CHANGE UP (ref 0–2)
BILIRUB DIRECT SERPL-MCNC: 4.68 MG/DL — HIGH (ref 0.05–0.2)
BILIRUB INDIRECT FLD-MCNC: 3.7 MG/DL — HIGH (ref 0.2–1)
BILIRUB SERPL-MCNC: 8.4 MG/DL — HIGH (ref 0.2–1.2)
BILIRUB SERPL-MCNC: 8.4 MG/DL — HIGH (ref 0.2–1.2)
BLD GP AB SCN SERPL QL: SIGNIFICANT CHANGE UP
BUN SERPL-MCNC: 33 MG/DL — HIGH (ref 7–23)
CALCIUM SERPL-MCNC: 8.5 MG/DL — SIGNIFICANT CHANGE UP (ref 8.5–10.1)
CHLORIDE SERPL-SCNC: 110 MMOL/L — HIGH (ref 96–108)
CO2 SERPL-SCNC: 23 MMOL/L — SIGNIFICANT CHANGE UP (ref 22–31)
CREAT SERPL-MCNC: 1.25 MG/DL — SIGNIFICANT CHANGE UP (ref 0.5–1.3)
DAT IGG-SP REAG RBC-IMP: ABNORMAL
DIR ANTIGLOB POLYSPECIFIC INTERPRETATION: ABNORMAL
EOSINOPHIL # BLD AUTO: 0.17 K/UL — SIGNIFICANT CHANGE UP (ref 0–0.5)
EOSINOPHIL NFR BLD AUTO: 2 % — SIGNIFICANT CHANGE UP (ref 0–6)
FIBRINOGEN PPP-MCNC: 204 MG/DL — LOW (ref 350–510)
GLUCOSE SERPL-MCNC: 92 MG/DL — SIGNIFICANT CHANGE UP (ref 70–99)
HCT VFR BLD CALC: 25 % — LOW (ref 39–50)
HGB BLD-MCNC: 8.1 G/DL — LOW (ref 13–17)
HYPOCHROMIA BLD QL: SIGNIFICANT CHANGE UP
IAT COMP-SP REAG SERPL QL: ABNORMAL
INR BLD: 1.73 RATIO — HIGH (ref 0.88–1.16)
LDH SERPL L TO P-CCNC: 402 U/L — HIGH (ref 50–242)
LYMPHOCYTES # BLD AUTO: 1.58 K/UL — SIGNIFICANT CHANGE UP (ref 1–3.3)
LYMPHOCYTES # BLD AUTO: 19 % — SIGNIFICANT CHANGE UP (ref 13–44)
M PNEUMO IGG SER IA-ACNC: 1.39 INDEX — HIGH
M PNEUMO IGG SER IA-ACNC: POSITIVE
M PNEUMO IGM SER-ACNC: 176 UNITS/ML — SIGNIFICANT CHANGE UP
MACROCYTES BLD QL: SLIGHT — SIGNIFICANT CHANGE UP
MAGNESIUM SERPL-MCNC: 1.8 MG/DL — SIGNIFICANT CHANGE UP (ref 1.6–2.6)
MANUAL SMEAR VERIFICATION: SIGNIFICANT CHANGE UP
MCHC RBC-ENTMCNC: 31.2 PG — SIGNIFICANT CHANGE UP (ref 27–34)
MCHC RBC-ENTMCNC: 32.4 GM/DL — SIGNIFICANT CHANGE UP (ref 32–36)
MCV RBC AUTO: 96.2 FL — SIGNIFICANT CHANGE UP (ref 80–100)
MONOCYTES # BLD AUTO: 0.83 K/UL — SIGNIFICANT CHANGE UP (ref 0–0.9)
MONOCYTES NFR BLD AUTO: 10 % — SIGNIFICANT CHANGE UP (ref 2–14)
MYCOPLASMA AG SPEC QL: NEGATIVE — SIGNIFICANT CHANGE UP
NEUTROPHILS # BLD AUTO: 5.73 K/UL — SIGNIFICANT CHANGE UP (ref 1.8–7.4)
NEUTROPHILS NFR BLD AUTO: 68 % — SIGNIFICANT CHANGE UP (ref 43–77)
NEUTS BAND # BLD: 1 % — SIGNIFICANT CHANGE UP (ref 0–8)
NRBC # BLD: 0 /100 — SIGNIFICANT CHANGE UP (ref 0–0)
NRBC # BLD: SIGNIFICANT CHANGE UP /100 WBCS (ref 0–0)
PHOSPHATE SERPL-MCNC: 2.9 MG/DL — SIGNIFICANT CHANGE UP (ref 2.5–4.5)
PLAT MORPH BLD: NORMAL — SIGNIFICANT CHANGE UP
PLATELET # BLD AUTO: 154 K/UL — SIGNIFICANT CHANGE UP (ref 150–400)
POIKILOCYTOSIS BLD QL AUTO: SLIGHT — SIGNIFICANT CHANGE UP
POLYCHROMASIA BLD QL SMEAR: SLIGHT — SIGNIFICANT CHANGE UP
POTASSIUM SERPL-MCNC: 3.7 MMOL/L — SIGNIFICANT CHANGE UP (ref 3.5–5.3)
POTASSIUM SERPL-SCNC: 3.7 MMOL/L — SIGNIFICANT CHANGE UP (ref 3.5–5.3)
PROT SERPL-MCNC: 7.6 GM/DL — SIGNIFICANT CHANGE UP (ref 6–8.3)
PROTHROM AB SERPL-ACNC: 19.7 SEC — HIGH (ref 10–12.9)
RBC # BLD: 2.6 M/UL — LOW (ref 4.2–5.8)
RBC # BLD: 2.6 M/UL — LOW (ref 4.2–5.8)
RBC # FLD: 24.3 % — HIGH (ref 10.3–14.5)
RBC BLD AUTO: ABNORMAL
RETICS #: 169.5 K/UL — HIGH (ref 25–125)
RETICS/RBC NFR: 6.5 % — HIGH (ref 0.5–2.5)
SODIUM SERPL-SCNC: 142 MMOL/L — SIGNIFICANT CHANGE UP (ref 135–145)
WBC # BLD: 8.31 K/UL — SIGNIFICANT CHANGE UP (ref 3.8–10.5)
WBC # FLD AUTO: 8.31 K/UL — SIGNIFICANT CHANGE UP (ref 3.8–10.5)

## 2019-05-15 PROCEDURE — 73719 MRI LOWER EXTREMITY W/DYE: CPT | Mod: 26,LT

## 2019-05-15 PROCEDURE — 99233 SBSQ HOSP IP/OBS HIGH 50: CPT

## 2019-05-15 RX ORDER — PANTOPRAZOLE SODIUM 20 MG/1
40 TABLET, DELAYED RELEASE ORAL
Refills: 0 | Status: DISCONTINUED | OUTPATIENT
Start: 2019-05-15 | End: 2019-05-31

## 2019-05-15 RX ORDER — FUROSEMIDE 40 MG
40 TABLET ORAL
Refills: 0 | Status: DISCONTINUED | OUTPATIENT
Start: 2019-05-15 | End: 2019-05-20

## 2019-05-15 RX ORDER — PHYTONADIONE (VIT K1) 5 MG
5 TABLET ORAL ONCE
Refills: 0 | Status: COMPLETED | OUTPATIENT
Start: 2019-05-15 | End: 2019-05-15

## 2019-05-15 RX ORDER — FUROSEMIDE 40 MG
20 TABLET ORAL ONCE
Refills: 0 | Status: COMPLETED | OUTPATIENT
Start: 2019-05-15 | End: 2019-05-15

## 2019-05-15 RX ADMIN — Medication 1 MILLIGRAM(S): at 12:55

## 2019-05-15 RX ADMIN — CHLORHEXIDINE GLUCONATE 1 APPLICATION(S): 213 SOLUTION TOPICAL at 12:56

## 2019-05-15 RX ADMIN — Medication 20 MILLIGRAM(S): at 12:54

## 2019-05-15 RX ADMIN — PANTOPRAZOLE SODIUM 40 MILLIGRAM(S): 20 TABLET, DELAYED RELEASE ORAL at 05:05

## 2019-05-15 RX ADMIN — Medication 5 MILLIGRAM(S): at 12:56

## 2019-05-15 RX ADMIN — Medication 5 MILLIGRAM(S): at 05:08

## 2019-05-15 RX ADMIN — SODIUM CHLORIDE 150 MILLILITER(S): 9 INJECTION INTRAMUSCULAR; INTRAVENOUS; SUBCUTANEOUS at 05:08

## 2019-05-15 RX ADMIN — CHLORHEXIDINE GLUCONATE 1 APPLICATION(S): 213 SOLUTION TOPICAL at 05:06

## 2019-05-15 RX ADMIN — Medication 20 MILLIGRAM(S): at 05:06

## 2019-05-15 RX ADMIN — Medication 1 TABLET(S): at 12:55

## 2019-05-15 NOTE — PROGRESS NOTE ADULT - PROBLEM SELECTOR PLAN 1
- Patient repeat ZAINAB Positive, but antibody screen negative, d/w Blood bank Head, will send out sample to [place] [facility] for Antibody testing, wanted repeat Testing tommorrow Morning  - daily cbc, hepatic function panel, retic, ldh  - Give Vitamin K x 1  - Hb trending up  - Unsure of hemolysing or secondary to large hematoma in leg  - follow coags - Patient repeat ZAINAB Positive, but antibody screen negative, d/w Blood bank Head, will send out sample to [place] [facility] [place] for Antibody testing, wanted repeat Testing tommorrow Morning  - daily cbc, hepatic function panel, retic, ldh  - Give Vitamin K x 1  - Hb trending up  - Unsure of hemolysing or secondary to large hematoma in leg  - follow coags  - will order two units FFP - Patient repeat ZAINAB Positive, but antibody screen negative, d/w Blood bank Head, will send out sample to [place] [facility] [place] for Antibody testing, wanted repeat Testing tommorrow Morning  - daily cbc, hepatic function panel, retic, ldh  - Give Vitamin K x 1  - Hb trending up  - Unsure of hemolysing or secondary to large hematoma in leg  - follow coags  - will order two units FFP  - Check YARED

## 2019-05-15 NOTE — PROGRESS NOTE ADULT - ASSESSMENT
5 yo BM with h/o HTN and ETOH abuse brought in by wife for generalized weakness and dark urine; pt  found to have lactic acidosis (8.8), possible RICHAR with Hb 2.9 and repeat 2.4. In the ER pt was hemodynamically stable    ID: No obvious infection bdut cellulitis on MRI Will  start antibiotics ID consult CVS: Lactate has normalized    HEME: patient now in 9 units CBC holding concern for hemolysis heme on board but may just be      FEN: Po diet/ Daily Thiamine, MVI and Folate      Renal: Follow BUN/Cr and UO  Suspect CKD 3-4, Pt with ATN continue lasix     Neuro/Psych:  Librium done  with CIWA Benzo coverage     MRI  Suggests hematoma with cellulitis   agree with [facility] to cinue blood eval and to give vitamin K and FFP

## 2019-05-15 NOTE — DIETITIAN INITIAL EVALUATION ADULT. - ENERGY NEEDS
Height (cm): 185.42 (05-09)  Weight (kg): 106 (05-15)  BMI (kg/m2): 30.9 (05-15)  IBW:  83.6 kg +/- 10%   % IBW:   127%     UBW:  stable     %UBW: 100%

## 2019-05-15 NOTE — CHART NOTE - NSCHARTNOTEFT_GEN_A_CORE
MRI Left femur with contrast reviewed, demonstrating likely hematoma  Recommend repeat MRI left femur with IV contrast in 4-6 weeks for evaluation of resolution of hematoma  If concern for active bleed, recommend vascular surgery consult for management  no further acute orthopedic surgical intervention indicated at this time  ortho stable  Discussed with Dr. Drummond, aware and in agreement with above plan

## 2019-05-15 NOTE — PROGRESS NOTE ADULT - SUBJECTIVE AND OBJECTIVE BOX
Patient is a 54y old  Male who presents with a chief complaint of anemia/ (14 May 2019 17:07)      HPI:  54 year old M HTN and ETOH abuse brought in by wife for generalized weakness and dark urine.  Wife reports patient has been having non-bloody non-bilious vomiting last week which stopped his drinking on Friday 5/3.  Wife also notes he has been having easy bruising and L leg swelling over the same amount of time.  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  Wife subsequently added that he has been having weakness and fatigue since early March with episodes of gum bleeding going back to early January.  Of note, wife noted that his skin color has changed over the past week.  Pt denies fevers, chills, eye pain vision changes, (08 May 2019 23:01)      INTERVAL HPI/OVERNIGHT EVENTS:  Lethargic (?)     MEDICATIONS  (STANDING):  chlordiazePOXIDE 5 milliGRAM(s) Oral every 12 hours  chlorhexidine 2% Cloths 1 Application(s) Topical daily  chlorhexidine 4% Liquid 1 Application(s) Topical <User Schedule>  folic acid 1 milliGRAM(s) Oral daily  furosemide   Injectable 20 milliGRAM(s) IV Push daily  multivitamin 1 Tablet(s) Oral daily  pantoprazole  Injectable 40 milliGRAM(s) IV Push every 12 hours  sodium chloride 0.9%. 1000 milliLiter(s) (150 mL/Hr) IV Continuous <Continuous>    MEDICATIONS  (PRN):  LORazepam   Injectable 2 milliGRAM(s) IV Push every 2 hours PRN CIWA-Ar score 8 or greater  oxyCODONE    5 mG/acetaminophen 325 mG 1 Tablet(s) Oral every 4 hours PRN Moderate Pain (4 - 6)  oxyCODONE    5 mG/acetaminophen 325 mG 2 Tablet(s) Oral every 6 hours PRN Severe Pain (7 - 10)  polyethylene glycol 3350 17 Gram(s) Oral daily PRN Constipation      FAMILY HISTORY:      Allergies    No Known Allergies    Intolerances        PMH/PSH:  Benign essential HTN        REVIEW OF SYSTEMS:  CONSTITUTIONAL: No fever, weight loss, or fatigue  EYES: No eye pain, visual disturbances, or discharge  ENMT:  No difficulty hearing, tinnitus, vertigo; No sinus or throat pain  NECK: No pain or stiffness  BREASTS: No pain, masses, or nipple discharge  RESPIRATORY: No cough, wheezing, chills or hemoptysis; No shortness of breath  CARDIOVASCULAR: No chest pain, palpitations, dizziness, or leg swelling  GASTROINTESTINAL: See above  GENITOURINARY: No dysuria, frequency, hematuria, or incontinence  NEUROLOGICAL: No headaches,  numbness, or tremors  SKIN: No itching, burning, rashes, or lesions   LYMPH NODES: No enlarged glands  ENDOCRINE: No heat or cold intolerance; No hair loss  MUSCULOSKELETAL: No joint pain or swelling; No muscle, back, or extremity pain  PSYCHIATRIC: No depression, anxiety, mood swings, or difficulty sleeping  HEME/LYMPH: No easy bruising, or bleeding gums  ALLERGY AND IMMUNOLOGIC: No hives or eczema    Vital Signs Last 24 Hrs  T(C): 36.6 (15 May 2019 05:17), Max: 36.6 (15 May 2019 05:17)  T(F): 97.8 (15 May 2019 05:17), Max: 97.8 (15 May 2019 05:17)  HR: 84 (15 May 2019 05:17) (76 - 84)  BP: 109/52 (15 May 2019 05:17) (99/64 - 109/52)  BP(mean): --  RR: 18 (15 May 2019 05:17) (16 - 21)  SpO2: 100% (15 May 2019 05:17) (100% - 100%)    PHYSICAL EXAM:  GENERAL: NAD, well-groomed, well-developed  HEAD:  Atraumatic, Normocephalic  EYES: EOMI, PERRLA, conjunctiva and sclera clear  NECK: Supple, No JVD, Normal thyroid  NERVOUS SYSTEM:  Alert & Oriented X 2, fair concentration; (?) Asterixis  CHEST/LUNG: Clear to percussion bilaterally; No rales, rhonchi, wheezing, or rubs  HEART: Regular rate and rhythm; No murmurs, rubs, or gallops  ABDOMEN: Soft, Nontender, Nondistended; Bowel sounds present  EXTREMITIES:  2+ Peripheral Pulses, No clubbing, cyanosis, or edema  LYMPH: No lymphadenopathy noted  SKIN: No rashes or lesions    LAB                          8.1    8.31  )-----------( 154      ( 15 May 2019 07:36 )             25.0       CBC:  05-15 @ 07:36  WBC 8.31   Hgb 8.1   Hct 25.0   Plts 154  MCV 96.2  05-14 @ 07:01  WBC 6.63   Hgb 7.2   Hct 21.9   Plts 122  MCV 94.0  05-13 @ 08:01  WBC 6.88   Hgb 5.9   Hct 18.0   Plts 132  MCV 94.2  05-12 @ 08:31  WBC 7.32   Hgb 6.2   Hct 18.7   Plts 101  MCV 94.0  05-11 @ 10:36  WBC 4.81   Hgb 7.3   Hct 21.8   Plts 90  MCV 92.4  05-11 @ 00:28  WBC 6.45   Hgb 6.6   Hct 19.5   Plts 82  MCV 93.8  05-10 @ 07:17  WBC 7.25   Hgb 5.1   Hct 15.1   Plts 80  MCV 96.8  05-09 @ 18:10  WBC 9.40   Hgb 6.3   Hct 18.5   Plts 89  MCV 97.9  05-09 @ 06:11  WBC 8.89   Hgb 4.3   Hct 13.4   Plts 77  .3  05-08 @ 22:33  WBC 11.16   Hgb 2.4   Hct 8.6   Plts 88  .5      Chemistry:  05-15 @ 07:36  Na+ 142  K+ 3.7  Cl- 110  CO2 23  BUN 33  Cr 1.25     05-14 @ 07:01  Na+ 141  K+ 3.9  Cl- 108  CO2 21  BUN 36  Cr 1.48     05-13 @ 08:01  Na+ 139  K+ 3.9  Cl- 107  CO2 22  BUN 39  Cr 1.68     05-12 @ 08:31  Na+ 138  K+ 3.8  Cl- 105  CO2 23  BUN 39  Cr 1.71     05-11 @ 10:36  Na+ 137  K+ 3.8  Cl- 104  CO2 21  BUN 35  Cr 2.08     05-10 @ 07:17  Na+ 141  K+ 3.6  Cl- 108  CO2 22  BUN 36  Cr 2.45     05-09 @ 06:11  Na+ 137  K+ 4.2  Cl- 107  CO2 21  BUN 28  Cr 2.34     05-08 @ 21:32  Na+ 138  K+ 3.8  Cl- 105  CO2 15  BUN 25  Cr 2.57         Glucose, Serum: 92 mg/dL (05-15 @ 07:36)  Glucose, Serum: 120 mg/dL (05-14 @ 07:01)  Glucose, Serum: 113 mg/dL (05-13 @ 08:01)  Glucose, Serum: 108 mg/dL (05-12 @ 08:31)  Glucose, Serum: 170 mg/dL (05-11 @ 10:36)  Glucose, Serum: 107 mg/dL (05-10 @ 07:17)  Glucose, Serum: 118 mg/dL (05-09 @ 06:11)  Glucose, Serum: 118 mg/dL (05-08 @ 21:32)      15 May 2019 07:36    142    |  110    |  33     ----------------------------<  92     3.7     |  23     |  1.25   14 May 2019 07:01    141    |  108    |  36     ----------------------------<  120    3.9     |  21     |  1.48   13 May 2019 08:01    139    |  107    |  39     ----------------------------<  113    3.9     |  22     |  1.68   12 May 2019 08:31    138    |  105    |  39     ----------------------------<  108    3.8     |  23     |  1.71   11 May 2019 10:36    137    |  104    |  35     ----------------------------<  170    3.8     |  21     |  2.08   10 May 2019 07:17    141    |  108    |  36     ----------------------------<  107    3.6     |  22     |  2.45   09 May 2019 06:11    137    |  107    |  28     ----------------------------<  118    4.2     |  21     |  2.34     Ca    8.5        15 May 2019 07:36  Ca    8.2        14 May 2019 07:01  Ca    8.3        13 May 2019 08:01  Ca    8.2        12 May 2019 08:31  Ca    7.9        11 May 2019 10:36  Ca    7.5        10 May 2019 07:17  Ca    7.6        09 May 2019 06:11  Phos  2.9       15 May 2019 07:36  Phos  2.9       14 May 2019 07:01  Phos  2.9       10 May 2019 07:17  Phos  3.4       09 May 2019 06:11  Mg     1.8       15 May 2019 07:36  Mg     1.7       14 May 2019 07:01  Mg     1.8       10 May 2019 07:17  Mg     1.7       09 May 2019 06:11    TPro  x      /  Alb  x      /  TBili  8.4    /  DBili  x      /  AST  x      /  ALT  x      /  AlkPhos  x      15 May 2019 07:37  TPro  7.6    /  Alb  1.9    /  TBili  8.4    /  DBili  4.68   /  AST  81     /  ALT  17     /  AlkPhos  127    15 May 2019 07:36  TPro  7.0    /  Alb  1.8    /  TBili  8.2    /  DBili  x      /  AST  64     /  ALT  12     /  AlkPhos  117    14 May 2019 07:01  TPro  6.5    /  Alb  1.7    /  TBili  8.2    /  DBili  4.12   /  AST  66     /  ALT  11     /  AlkPhos  114    13 May 2019 08:01  TPro  6.7    /  Alb  1.8    /  TBili  8.0    /  DBili  4.11   /  AST  63     /  ALT  13     /  AlkPhos  79     12 May 2019 08:31  TPro  6.9    /  Alb  1.8    /  TBili  8.8    /  DBili  4.47   /  AST  76     /  ALT  12     /  AlkPhos  96     11 May 2019 10:36  TPro  6.1    /  Alb  1.6    /  TBili  6.1    /  DBili  3.17   /  AST  67     /  ALT  13     /  AlkPhos  72     10 May 2019 07:17      PT/INR - ( 15 May 2019 07:36 )   PT: 19.7 sec;   INR: 1.73 ratio         PTT - ( 15 May 2019 07:36 )  PTT:32.0 sec        CAPILLARY BLOOD GLUCOSE              RADIOLOGY & ADDITIONAL TESTS:    Imaging Personally Reviewed:  [ ] YES  [ ] NO    Consultant(s) Notes Reviewed:  [ ] YES  [ ] NO    Care Discussed with Consultants/Other Providers [ ] YES  [ ] NO

## 2019-05-15 NOTE — PROGRESS NOTE ADULT - SUBJECTIVE AND OBJECTIVE BOX
Patient feels ok, no distress;     MEDICATIONS  (STANDING):  chlordiazePOXIDE 5 milliGRAM(s) Oral every 12 hours  chlorhexidine 2% Cloths 1 Application(s) Topical daily  chlorhexidine 4% Liquid 1 Application(s) Topical <User Schedule>  folic acid 1 milliGRAM(s) Oral daily  furosemide   Injectable 20 milliGRAM(s) IV Push daily  multivitamin 1 Tablet(s) Oral daily  pantoprazole  Injectable 40 milliGRAM(s) IV Push every 12 hours    MEDICATIONS  (PRN):  LORazepam   Injectable 2 milliGRAM(s) IV Push every 2 hours PRN CIWA-Ar score 8 or greater  oxyCODONE    5 mG/acetaminophen 325 mG 1 Tablet(s) Oral every 4 hours PRN Moderate Pain (4 - 6)  oxyCODONE    5 mG/acetaminophen 325 mG 2 Tablet(s) Oral every 6 hours PRN Severe Pain (7 - 10)  polyethylene glycol 3350 17 Gram(s) Oral daily PRN Constipation      05-14-19 @ 07:01  -  05-15-19 @ 07:00  --------------------------------------------------------  IN: 1860 mL / OUT: 0 mL / NET: 1860 mL      PHYSICAL EXAM:      T(C): 36.4 (05-15-19 @ 11:20), Max: 36.6 (05-15-19 @ 05:17)  HR: 81 (05-15-19 @ 11:20) (77 - 84)  BP: 119/71 (05-15-19 @ 11:20) (99/64 - 119/71)  RR: 18 (05-15-19 @ 11:20) (18 - 21)  SpO2: 100% (05-15-19 @ 11:20) (100% - 100%)  Wt(kg): --  Respiratory: clear anteriorly, decreased BS at bases  Cardiovascular: S1 S2  Gastrointestinal: soft NT ND +BS  Extremities:  1-2 edema                                    8.1    8.31  )-----------( 154      ( 15 May 2019 07:36 )             25.0     05-15    142  |  110<H>  |  33<H>  ----------------------------<  92  3.7   |  23  |  1.25    Ca    8.5      15 May 2019 07:36  Phos  2.9     05-15  Mg     1.8     05-15    TPro  x   /  Alb  x   /  TBili  8.4<H>  /  DBili  x   /  AST  x   /  ALT  x   /  AlkPhos  x   05-15      LIVER FUNCTIONS - ( 15 May 2019 07:36 )  Alb: 1.9 g/dL / Pro: 7.6 gm/dL / ALK PHOS: 127 U/L / ALT: 17 U/L / AST: 81 U/L / GGT: x           Creatinine Trend: 1.25<--, 1.48<--, 1.68<--, 1.71<--, 2.08<--, 2.45<--  Assessment and Plan:    RICHAR risk  for pigment ATN; LDH elevated with dark urine on admission, now improving;   Holding IVF now; continue loop diuretic to maintain Na balance;  Will follow closely;

## 2019-05-15 NOTE — PROGRESS NOTE ADULT - SUBJECTIVE AND OBJECTIVE BOX
Patient states feeling better    Vital Signs Last 24 Hrs  T(C): 36.6 (15 May 2019 05:17), Max: 36.6 (15 May 2019 05:17)  T(F): 97.8 (15 May 2019 05:17), Max: 97.8 (15 May 2019 05:17)  HR: 84 (15 May 2019 05:17) (76 - 84)  BP: 109/52 (15 May 2019 05:17) (99/64 - 109/52)  BP(mean): --  RR: 18 (15 May 2019 05:17) (16 - 21)  SpO2: 100% (15 May 2019 05:17) (100% - 100%)    PHYSICAL EXAM:    general - AAO x 3  HEENT - Icterus +  CVS - RRR  RS - AE B/L  Abd - soft, NT  Ext - Pulses +        LABS:                        8.1    8.31  )-----------( 154      ( 15 May 2019 07:36 )             25.0     05-15    142  |  110<H>  |  33<H>  ----------------------------<  92  3.7   |  23  |  1.25    Ca    8.5      15 May 2019 07:36  Phos  2.9     05-15  Mg     1.8     05-15    TPro  x   /  Alb  x   /  TBili  8.4<H>  /  DBili  x   /  AST  x   /  ALT  x   /  AlkPhos  x   05-15    PT/INR - ( 15 May 2019 07:36 )   PT: 19.7 sec;   INR: 1.73 ratio         PTT - ( 15 May 2019 07:36 )  PTT:32.0 sec      Culture - Urine (collected 09 May 2019 10:52)  Source: .Urine  Final Report (10 May 2019 15:26):    No growth    Culture - Blood (collected 09 May 2019 01:03)  Source: .Blood  Final Report (14 May 2019 02:00):    No growth at 5 days.    Culture - Blood (collected 09 May 2019 01:03)  Source: .Blood  Final Report (14 May 2019 02:00):    No growth at 5 days.        RADIOLOGY & ADDITIONAL STUDIES:

## 2019-05-15 NOTE — DIETITIAN INITIAL EVALUATION ADULT. - PERTINENT LABORATORY DATA
05-15 Na142 mmol/L Glu 92 mg/dL K+ 3.7 mmol/L Cr  1.25 mg/dL BUN 33 mg/dL<H> 05-15 Phos 2.9 mg/dL 05-15 Alb 1.9 g/dL<L>; elevated bilirubins

## 2019-05-15 NOTE — DIETITIAN INITIAL EVALUATION ADULT. - OTHER INFO
Pt seen for LOS. Pt lives c spouse; independent c ADL. Denies any N/V at this time; PTA V due to alcohol abuse now subsided; no C/D or chew/swallowing difficulty.  Recommended adding thiamine to RN; will discuss c MD.

## 2019-05-15 NOTE — PROGRESS NOTE ADULT - PROBLEM SELECTOR PLAN 2
8.4  ( 4.68 ) / 81 / 17 /  127 . Lethargic ( Encephalopathic  (?) ) Probably secondary to hemolysis and underlying cirrhosis Viral studies negative. Check ammonia level. start lactulose.

## 2019-05-15 NOTE — PROGRESS NOTE ADULT - SUBJECTIVE AND OBJECTIVE BOX
HPI:  54 year old M HTN and ETOH abuse brought in by wife for generalized weakness and dark urine.  Wife reports patient has been having non-bloody non-bilious vomiting last week which stopped his drinking on Friday 5/3.  Wife also notes he has been having easy bruising and L leg swelling over the same amount of time.  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  Wife subsequently added that he has been having weakness and fatigue since early March with episodes of gum bleeding going back to early January.  Of note, wife noted that his skin color has changed over the past week.  Pt denies fevers, chills, eye pain vision changes, (08 May 2019 23:01)    Patient is a 54y old  Male who presents with a chief complaint of anemia/ (15 May 2019 14:52)      INTERVAL HPI/OVERNIGHT EVENTS:no acute events feels ok     MEDICATIONS  (STANDING):  chlorhexidine 2% Cloths 1 Application(s) Topical daily  chlorhexidine 4% Liquid 1 Application(s) Topical <User Schedule>  folic acid 1 milliGRAM(s) Oral daily  furosemide    Tablet 40 milliGRAM(s) Oral two times a day  multivitamin 1 Tablet(s) Oral daily  pantoprazole    Tablet 40 milliGRAM(s) Oral before breakfast    MEDICATIONS  (PRN):  LORazepam   Injectable 2 milliGRAM(s) IV Push every 2 hours PRN CIWA-Ar score 8 or greater  oxyCODONE    5 mG/acetaminophen 325 mG 1 Tablet(s) Oral every 4 hours PRN Moderate Pain (4 - 6)  oxyCODONE    5 mG/acetaminophen 325 mG 2 Tablet(s) Oral every 6 hours PRN Severe Pain (7 - 10)  polyethylene glycol 3350 17 Gram(s) Oral daily PRN Constipation      Allergies    No Known Allergies    Intolerances        REVIEW OF SYSTEMS:  CONSTITUTIONAL: No fever, weight loss, or fatigue  EYES: No eye pain, visual disturbances, or discharge  ENMT:  No difficulty hearing, tinnitus, vertigo; No sinus or throat pain  NECK: No pain or stiffness  BREASTS: No pain, masses, or nipple discharge  RESPIRATORY: No cough, wheezing, chills or hemoptysis; No shortness of breath  CARDIOVASCULAR: No chest pain, palpitations, dizziness, or leg swelling  GASTROINTESTINAL: No abdominal or epigastric pain. No nausea, vomiting, or hematemesis; No diarrhea or constipation. No melena or hematochezia.  GENITOURINARY: No dysuria, frequency, hematuria, or incontinence  NEUROLOGICAL: No headaches, memory loss, loss of strength, numbness, or tremors  SKIN: No itching, burning, rashes, or lesions   LYMPH NODES: No enlarged glands  ENDOCRINE: No heat or cold intolerance; No hair loss  MUSCULOSKELETAL: No joint pain or swelling; No muscle, back, or extremity pain  PSYCHIATRIC: No depression, anxiety, mood swings, or difficulty sleeping  HEME/LYMPH: No easy bruising, or bleeding gums  ALLERGY AND IMMUNOLOGIC: No hives or eczema    Vital Signs Last 24 Hrs  T(C): 36.7 (15 May 2019 14:46), Max: 36.7 (15 May 2019 14:30)  T(F): 98.1 (15 May 2019 14:46), Max: 98.1 (15 May 2019 14:46)  HR: 84 (15 May 2019 14:46) (77 - 87)  BP: 119/63 (15 May 2019 14:46) (99/64 - 134/77)  BP(mean): --  RR: 16 (15 May 2019 14:46) (16 - 21)  SpO2: 99% (15 May 2019 14:46) (99% - 100%)    PHYSICAL EXAM:  GENERAL: NAD, well-groomed, well-developed  HEAD:  Atraumatic, Normocephalic  EYES: EOMI, PERRLA, conjunctiva and sclera clear  ENMT: No tonsillar erythema, exudates, or enlargement; Moist mucous membranes, Good dentition, No lesions  NECK: Supple, No JVD, Normal thyroid  NERVOUS SYSTEM:  Alert & Oriented X3, Good concentration; Motor Strength 5/5 B/L upper and lower extremities; DTRs 2+ intact and symmetric  CHEST/LUNG: Clear to percussion bilaterally; No rales, rhonchi, wheezing, or rubs  HEART: Regular rate and rhythm; No murmurs, rubs, or gallops  ABDOMEN: Soft, Nontender, Nondistended; Bowel sounds present  EXTREMITIES:  2+ Peripheral Pulses, No clubbing, cyanosis, or edema  LYMPH: No lymphadenopathy noted  SKIN: No rashes or lesions    LABS:                        8.1    8.31  )-----------( 154      ( 15 May 2019 07:36 )             25.0     05-15    142  |  110<H>  |  33<H>  ----------------------------<  92  3.7   |  23  |  1.25    Ca    8.5      15 May 2019 07:36  Phos  2.9     05-15  Mg     1.8     05-15    TPro  x   /  Alb  x   /  TBili  8.4<H>  /  DBili  x   /  AST  x   /  ALT  x   /  AlkPhos  x   05-15    PT/INR - ( 15 May 2019 07:36 )   PT: 19.7 sec;   INR: 1.73 ratio         PTT - ( 15 May 2019 07:36 )  PTT:32.0 sec    CAPILLARY BLOOD GLUCOSE          RADIOLOGY & ADDITIONAL TESTS:  Impression:    No internal enhancement within the previously noted intermediate to   hyperintense T1 and STIR collection along the posterior lateral aspect of   the distal femur. This is likely related to a hematoma. Superimposed   infection cannot be excluded on an imaging basis. Follow-up to resolution   of this collection is recommended to exclude any occult lesion.    Extensive cellulitis about the imaged left femur. Patchy enhancement   within the left abductor and hamstring musculature corresponding to   regions of increased or signal which may be related to nonspecific   myositis or muscle strain          Imaging Personally Reviewed:  [ X] YES  [ ] NO    Consultant(s) Notes Reviewed:  [ X] YES  [ ] NO    Care Discussed with Consultants/Other Providers [ X] YES  [ ] NO

## 2019-05-16 DIAGNOSIS — G93.41 METABOLIC ENCEPHALOPATHY: ICD-10-CM

## 2019-05-16 DIAGNOSIS — I10 ESSENTIAL (PRIMARY) HYPERTENSION: ICD-10-CM

## 2019-05-16 DIAGNOSIS — F10.10 ALCOHOL ABUSE, UNCOMPLICATED: ICD-10-CM

## 2019-05-16 DIAGNOSIS — D62 ACUTE POSTHEMORRHAGIC ANEMIA: ICD-10-CM

## 2019-05-16 DIAGNOSIS — D59.9 ACQUIRED HEMOLYTIC ANEMIA, UNSPECIFIED: ICD-10-CM

## 2019-05-16 DIAGNOSIS — N17.9 ACUTE KIDNEY FAILURE, UNSPECIFIED: ICD-10-CM

## 2019-05-16 DIAGNOSIS — L03.116 CELLULITIS OF LEFT LOWER LIMB: ICD-10-CM

## 2019-05-16 DIAGNOSIS — K70.31 ALCOHOLIC CIRRHOSIS OF LIVER WITH ASCITES: ICD-10-CM

## 2019-05-16 DIAGNOSIS — T14.8XXA OTHER INJURY OF UNSPECIFIED BODY REGION, INITIAL ENCOUNTER: ICD-10-CM

## 2019-05-16 LAB
ALBUMIN SERPL ELPH-MCNC: 2.1 G/DL — LOW (ref 3.3–5)
ALP SERPL-CCNC: 108 U/L — SIGNIFICANT CHANGE UP (ref 40–120)
ALT FLD-CCNC: 21 U/L — SIGNIFICANT CHANGE UP (ref 12–78)
ANION GAP SERPL CALC-SCNC: 11 MMOL/L — SIGNIFICANT CHANGE UP (ref 5–17)
APTT BLD: 29.6 SEC — SIGNIFICANT CHANGE UP (ref 28.5–37)
AST SERPL-CCNC: 112 U/L — HIGH (ref 15–37)
BASOPHILS # BLD AUTO: 0.02 K/UL — SIGNIFICANT CHANGE UP (ref 0–0.2)
BASOPHILS NFR BLD AUTO: 0.2 % — SIGNIFICANT CHANGE UP (ref 0–2)
BILIRUB DIRECT SERPL-MCNC: 6.48 MG/DL — HIGH (ref 0.05–0.2)
BILIRUB INDIRECT FLD-MCNC: 3.6 MG/DL — HIGH (ref 0.2–1)
BILIRUB SERPL-MCNC: 10.1 MG/DL — HIGH (ref 0.2–1.2)
BUN SERPL-MCNC: 30 MG/DL — HIGH (ref 7–23)
CALCIUM SERPL-MCNC: 8.7 MG/DL — SIGNIFICANT CHANGE UP (ref 8.5–10.1)
CHLORIDE SERPL-SCNC: 109 MMOL/L — HIGH (ref 96–108)
CO2 SERPL-SCNC: 22 MMOL/L — SIGNIFICANT CHANGE UP (ref 22–31)
CREAT SERPL-MCNC: 1.12 MG/DL — SIGNIFICANT CHANGE UP (ref 0.5–1.3)
DAT IGG-SP REAG RBC-IMP: ABNORMAL
DIR ANTIGLOB POLYSPECIFIC INTERPRETATION: ABNORMAL
EOSINOPHIL # BLD AUTO: 0.05 K/UL — SIGNIFICANT CHANGE UP (ref 0–0.5)
EOSINOPHIL NFR BLD AUTO: 0.5 % — SIGNIFICANT CHANGE UP (ref 0–6)
FIBRINOGEN PPP-MCNC: 258 MG/DL — LOW (ref 350–510)
GLUCOSE SERPL-MCNC: 120 MG/DL — HIGH (ref 70–99)
HCT VFR BLD CALC: 22.9 % — LOW (ref 39–50)
HGB BLD-MCNC: 7.5 G/DL — LOW (ref 13–17)
IAT COMP-SP REAG SERPL QL: ABNORMAL
IMM GRANULOCYTES NFR BLD AUTO: 1.8 % — HIGH (ref 0–1.5)
INR BLD: 1.87 RATIO — HIGH (ref 0.88–1.16)
LDH SERPL L TO P-CCNC: 490 U/L — HIGH (ref 50–242)
LYMPHOCYTES # BLD AUTO: 0.76 K/UL — LOW (ref 1–3.3)
LYMPHOCYTES # BLD AUTO: 7.5 % — LOW (ref 13–44)
MAGNESIUM SERPL-MCNC: 1.7 MG/DL — SIGNIFICANT CHANGE UP (ref 1.6–2.6)
MCHC RBC-ENTMCNC: 31.6 PG — SIGNIFICANT CHANGE UP (ref 27–34)
MCHC RBC-ENTMCNC: 32.8 GM/DL — SIGNIFICANT CHANGE UP (ref 32–36)
MCV RBC AUTO: 96.6 FL — SIGNIFICANT CHANGE UP (ref 80–100)
MONOCYTES # BLD AUTO: 1.68 K/UL — HIGH (ref 0–0.9)
MONOCYTES NFR BLD AUTO: 16.5 % — HIGH (ref 2–14)
NEUTROPHILS # BLD AUTO: 7.47 K/UL — HIGH (ref 1.8–7.4)
NEUTROPHILS NFR BLD AUTO: 73.5 % — SIGNIFICANT CHANGE UP (ref 43–77)
NRBC # BLD: 0 /100 WBCS — SIGNIFICANT CHANGE UP (ref 0–0)
PHOSPHATE SERPL-MCNC: 3.1 MG/DL — SIGNIFICANT CHANGE UP (ref 2.5–4.5)
PLATELET # BLD AUTO: 166 K/UL — SIGNIFICANT CHANGE UP (ref 150–400)
POTASSIUM SERPL-MCNC: 3.3 MMOL/L — LOW (ref 3.5–5.3)
POTASSIUM SERPL-SCNC: 3.3 MMOL/L — LOW (ref 3.5–5.3)
PROT SERPL-MCNC: 7.6 GM/DL — SIGNIFICANT CHANGE UP (ref 6–8.3)
PROTHROM AB SERPL-ACNC: 21.3 SEC — HIGH (ref 10–12.9)
RBC # BLD: 2.37 M/UL — LOW (ref 4.2–5.8)
RBC # BLD: 2.37 M/UL — LOW (ref 4.2–5.8)
RBC # FLD: 25.2 % — HIGH (ref 10.3–14.5)
RETICS #: 128.9 K/UL — HIGH (ref 25–125)
RETICS/RBC NFR: 5.4 % — HIGH (ref 0.5–2.5)
SODIUM SERPL-SCNC: 142 MMOL/L — SIGNIFICANT CHANGE UP (ref 135–145)
WBC # BLD: 10.16 K/UL — SIGNIFICANT CHANGE UP (ref 3.8–10.5)
WBC # FLD AUTO: 10.16 K/UL — SIGNIFICANT CHANGE UP (ref 3.8–10.5)

## 2019-05-16 PROCEDURE — 93010 ELECTROCARDIOGRAM REPORT: CPT

## 2019-05-16 PROCEDURE — 99233 SBSQ HOSP IP/OBS HIGH 50: CPT

## 2019-05-16 PROCEDURE — 73701 CT LOWER EXTREMITY W/DYE: CPT | Mod: 26,LT

## 2019-05-16 RX ORDER — PHYTONADIONE (VIT K1) 5 MG
5 TABLET ORAL ONCE
Refills: 0 | Status: COMPLETED | OUTPATIENT
Start: 2019-05-16 | End: 2019-05-16

## 2019-05-16 RX ORDER — PIPERACILLIN AND TAZOBACTAM 4; .5 G/20ML; G/20ML
3.38 INJECTION, POWDER, LYOPHILIZED, FOR SOLUTION INTRAVENOUS EVERY 8 HOURS
Refills: 0 | Status: DISCONTINUED | OUTPATIENT
Start: 2019-05-16 | End: 2019-05-24

## 2019-05-16 RX ORDER — LACTULOSE 10 G/15ML
10 SOLUTION ORAL ONCE
Refills: 0 | Status: COMPLETED | OUTPATIENT
Start: 2019-05-16 | End: 2019-05-16

## 2019-05-16 RX ORDER — LACTULOSE 10 G/15ML
20 SOLUTION ORAL ONCE
Refills: 0 | Status: COMPLETED | OUTPATIENT
Start: 2019-05-16 | End: 2019-05-16

## 2019-05-16 RX ORDER — POTASSIUM CHLORIDE 20 MEQ
40 PACKET (EA) ORAL ONCE
Refills: 0 | Status: COMPLETED | OUTPATIENT
Start: 2019-05-16 | End: 2019-05-16

## 2019-05-16 RX ORDER — LACTULOSE 10 G/15ML
10 SOLUTION ORAL
Refills: 0 | Status: DISCONTINUED | OUTPATIENT
Start: 2019-05-17 | End: 2019-05-31

## 2019-05-16 RX ORDER — VANCOMYCIN HCL 1 G
1000 VIAL (EA) INTRAVENOUS EVERY 12 HOURS
Refills: 0 | Status: DISCONTINUED | OUTPATIENT
Start: 2019-05-16 | End: 2019-05-24

## 2019-05-16 RX ADMIN — PANTOPRAZOLE SODIUM 40 MILLIGRAM(S): 20 TABLET, DELAYED RELEASE ORAL at 07:53

## 2019-05-16 RX ADMIN — CHLORHEXIDINE GLUCONATE 1 APPLICATION(S): 213 SOLUTION TOPICAL at 07:52

## 2019-05-16 RX ADMIN — LACTULOSE 20 GRAM(S): 10 SOLUTION ORAL at 13:36

## 2019-05-16 RX ADMIN — Medication 40 MILLIGRAM(S): at 05:14

## 2019-05-16 RX ADMIN — Medication 5 MILLIGRAM(S): at 15:44

## 2019-05-16 RX ADMIN — Medication 250 MILLIGRAM(S): at 18:03

## 2019-05-16 RX ADMIN — PIPERACILLIN AND TAZOBACTAM 25 GRAM(S): 4; .5 INJECTION, POWDER, LYOPHILIZED, FOR SOLUTION INTRAVENOUS at 15:49

## 2019-05-16 RX ADMIN — PIPERACILLIN AND TAZOBACTAM 25 GRAM(S): 4; .5 INJECTION, POWDER, LYOPHILIZED, FOR SOLUTION INTRAVENOUS at 22:27

## 2019-05-16 RX ADMIN — OXYCODONE AND ACETAMINOPHEN 2 TABLET(S): 5; 325 TABLET ORAL at 04:39

## 2019-05-16 RX ADMIN — LACTULOSE 10 GRAM(S): 10 SOLUTION ORAL at 18:06

## 2019-05-16 RX ADMIN — Medication 10 MILLIGRAM(S): at 22:32

## 2019-05-16 RX ADMIN — OXYCODONE AND ACETAMINOPHEN 2 TABLET(S): 5; 325 TABLET ORAL at 05:39

## 2019-05-16 RX ADMIN — Medication 40 MILLIEQUIVALENT(S): at 11:46

## 2019-05-16 RX ADMIN — Medication 1 TABLET(S): at 11:46

## 2019-05-16 RX ADMIN — Medication 1 MILLIGRAM(S): at 11:46

## 2019-05-16 RX ADMIN — Medication 40 MILLIGRAM(S): at 18:03

## 2019-05-16 RX ADMIN — Medication 125 MILLIGRAM(S): at 11:46

## 2019-05-16 NOTE — CONSULT NOTE ADULT - SUBJECTIVE AND OBJECTIVE BOX
Vascular Attending:      HPI:  54 year old M HTN and ETOH abuse brought in by wife for generalized weakness and dark urine.  Wife reports patient has been having non-bloody non-bilious vomiting last week which stopped his drinking on Friday 5/3.  Wife also notes he has been having easy bruising and L leg swelling over the same amount of time.  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  Wife subsequently added that he has been having weakness and fatigue since early March with episodes of gum bleeding going back to early January.  Of note, wife noted that his skin color has changed over the past week.  Pt denies fevers, chills, eye pain vision changes, (08 May 2019 23:01)    Pt was seen at bedside with Dr Vaughn  Pt. states he was walking his dog and "missed a step". Pt states swelling in the leg has improved    PAST MEDICAL & SURGICAL HISTORY:  Benign essential HTN    Allergies  No Known Allergies    Vital Signs Last 24 Hrs  T(C): 35.7 (16 May 2019 12:00), Max: 36.9 (15 May 2019 23:09)  T(F): 96.2 (16 May 2019 12:00), Max: 98.4 (15 May 2019 23:09)  HR: 74 (16 May 2019 12:00) (74 - 90)  BP: 96/62 (16 May 2019 12:00) (96/62 - 134/77)  RR: 18 (16 May 2019 12:00) (16 - 18)  SpO2: 99% (16 May 2019 12:00) (97% - 100%)                                                       Physical Exam:  General: awake, no acute distress  HEENT: NCAT  Chest: nonlabored respirations  Extremities: B/l lower extremity edema, L>R, 2+ pitting edema left lower leg and foot, warm and well perfused. Normal DP/PT pulses b/l  Skin: scattered ecchymosis left posterior thigh and left lower leg  MS: passive ROM at left knee    LABS:                        7.5    10.16 )-----------( 166      ( 16 May 2019 07:02 )             22.9     05-16    142  |  109<H>  |  30<H>  ----------------------------<  120<H>  3.3<L>   |  22  |  1.12    Ca    8.7      16 May 2019 07:02  Phos  3.1     05-16  Mg     1.7     05-16    TPro  7.6  /  Alb  2.1<L>  /  TBili  10.1<H>  /  DBili  6.48<H>  /  AST  112<H>  /  ALT  21  /  AlkPhos  108  05-16    PT/INR - ( 16 May 2019 07:02 )   PT: 21.3 sec;   INR: 1.87 ratio     PTT - ( 16 May 2019 07:02 )  PTT:29.6 sec    RADIOLOGY & ADDITIONAL STUDIES  < from: MR Femur w/ IV Cont, Left (05.15.19 @ 09:12) >  Impression:    No internal enhancement within the previously noted intermediate to   hyperintense T1 and STIR collection along the posterior lateral aspect of   the distal femur. This is likely related to a hematoma. Superimposed   infection cannot be excluded on an imaging basis. Follow-up to resolution   of this collection is recommended to exclude any occult lesion.    Extensive cellulitis about the imaged left femur. Patchy enhancement   within the left abductor and hamstring musculature corresponding to   regions of increased or signal which may be related to nonspecific   myositis or muscle strain    < end of copied text >      Impression and Plan: 55yo male admitted with Vascular Attending:      HPI:  54 year old M HTN and ETOH abuse brought in by wife for generalized weakness and dark urine.  Wife reports patient has been having non-bloody non-bilious vomiting last week which stopped his drinking on Friday 5/3.  Wife also notes he has been having easy bruising and L leg swelling over the same amount of time.  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  Wife subsequently added that he has been having weakness and fatigue since early March with episodes of gum bleeding going back to early January.  Of note, wife noted that his skin color has changed over the past week.  Pt denies fevers, chills, eye pain vision changes, (08 May 2019 23:01)    Pt was seen at bedside with Dr Vaughn  Pt. states he was walking his dog and "missed a step". Pt states swelling in the leg has improved    PAST MEDICAL & SURGICAL HISTORY:  Benign essential HTN    Allergies  No Known Allergies    Vital Signs Last 24 Hrs  T(C): 35.7 (16 May 2019 12:00), Max: 36.9 (15 May 2019 23:09)  T(F): 96.2 (16 May 2019 12:00), Max: 98.4 (15 May 2019 23:09)  HR: 74 (16 May 2019 12:00) (74 - 90)  BP: 96/62 (16 May 2019 12:00) (96/62 - 134/77)  RR: 18 (16 May 2019 12:00) (16 - 18)  SpO2: 99% (16 May 2019 12:00) (97% - 100%)                                                       Physical Exam:  General: awake, no acute distress  HEENT: NCAT  Chest: nonlabored respirations  Extremities: B/l lower extremity edema, L>R, 2+ pitting edema left lower leg and foot, warm and well perfused. Normal DP/PT pulses b/l  Skin: scattered ecchymosis left posterior thigh and left lower leg  MS: passive ROM at left knee    LABS:                        7.5    10.16 )-----------( 166      ( 16 May 2019 07:02 )             22.9     05-16    142  |  109<H>  |  30<H>  ----------------------------<  120<H>  3.3<L>   |  22  |  1.12    Ca    8.7      16 May 2019 07:02  Phos  3.1     05-16  Mg     1.7     05-16    TPro  7.6  /  Alb  2.1<L>  /  TBili  10.1<H>  /  DBili  6.48<H>  /  AST  112<H>  /  ALT  21  /  AlkPhos  108  05-16    PT/INR - ( 16 May 2019 07:02 )   PT: 21.3 sec;   INR: 1.87 ratio     PTT - ( 16 May 2019 07:02 )  PTT:29.6 sec    RADIOLOGY & ADDITIONAL STUDIES  < from: MR Femur w/ IV Cont, Left (05.15.19 @ 09:12) >  Impression:    No internal enhancement within the previously noted intermediate to   hyperintense T1 and STIR collection along the posterior lateral aspect of   the distal femur. This is likely related to a hematoma. Superimposed   infection cannot be excluded on an imaging basis. Follow-up to resolution   of this collection is recommended to exclude any occult lesion.    Extensive cellulitis about the imaged left femur. Patchy enhancement   within the left abductor and hamstring musculature corresponding to   regions of increased or signal which may be related to nonspecific   myositis or muscle strain    < end of copied text >      Impression and Plan: 54M PMH HTN and ETOH abuse with anemia and hyperbilirubinemia ?d/t hemolysis, with left thigh hematoma. Per Dr Vaughn, in setting of anemia and left lower extremity hematoma, consider bleeding etiology.   Recommend CTA left lower extremity  Discussed with Dr. Edmond

## 2019-05-16 NOTE — PROGRESS NOTE ADULT - SUBJECTIVE AND OBJECTIVE BOX
Lying in bed, feels well    Vital Signs Last 24 Hrs  T(C): 36.2 (16 May 2019 05:20), Max: 36.9 (15 May 2019 23:09)  T(F): 97.2 (16 May 2019 05:20), Max: 98.4 (15 May 2019 23:09)  HR: 90 (16 May 2019 05:20) (81 - 90)  BP: 111/63 (16 May 2019 05:20) (109/69 - 134/77)  BP(mean): --  RR: 17 (16 May 2019 05:20) (16 - 18)  SpO2: 97% (16 May 2019 05:20) (97% - 100%)    PHYSICAL EXAM:    general - AAO x 3  HEENT - Icterus +  CVS - RRR  RS - AE B/L  Abd - soft, NT  Ext - Pulses +        LABS:                        7.5    10.16 )-----------( 166      ( 16 May 2019 07:02 )             22.9     05-16    142  |  109<H>  |  30<H>  ----------------------------<  120<H>  3.3<L>   |  22  |  1.12    Ca    8.7      16 May 2019 07:02  Phos  3.1     05-16  Mg     1.7     05-16    TPro  7.6  /  Alb  2.1<L>  /  TBili  10.1<H>  /  DBili  6.48<H>  /  AST  112<H>  /  ALT  21  /  AlkPhos  108  05-16    PT/INR - ( 16 May 2019 07:02 )   PT: 21.3 sec;   INR: 1.87 ratio         PTT - ( 16 May 2019 07:02 )  PTT:29.6 sec      Culture - Urine (collected 09 May 2019 10:52)  Source: .Urine  Final Report (10 May 2019 15:26):    No growth    Culture - Blood (collected 09 May 2019 01:03)  Source: .Blood  Final Report (14 May 2019 02:00):    No growth at 5 days.    Culture - Blood (collected 09 May 2019 01:03)  Source: .Blood  Final Report (14 May 2019 02:00):    No growth at 5 days.        RADIOLOGY & ADDITIONAL STUDIES:

## 2019-05-16 NOTE — PROGRESS NOTE ADULT - PROBLEM SELECTOR PLAN 1
-D/w Head of Blood Bank, Identification of Antibody not done yet from [facility] Positive, but Antibody screen negative - she is unsure if Autoantibody or some form of interferance   - Bilirubin Trending up, But Indirect Bilirubin only 3.6, with evidence of Cirrhosis on USG and Direct Bilirubin >6, so must be component of Liver Disease  - Also patient with Large Hematoma in Leg that can lead to low Haptoglobin levels, re-check haptoglobin, LDH, and Retic Count  - will re-start emperic steroids till result from [facility]  - consider transfer to [facility]

## 2019-05-16 NOTE — PROGRESS NOTE ADULT - PROBLEM SELECTOR PLAN 2
8.4  ( 4.68 ) / 81 / 17 /  127 .==> 10.1 / 11.2 / 21 / 108  Lethargic ( Encephalopathic  (?) ) Probably secondary to hemolysis and underlying cirrhosis . Viral studies negative. CT scan / Sono [id number] essentially negative. Check ammonia level. start lactulose.

## 2019-05-16 NOTE — PROGRESS NOTE ADULT - ASSESSMENT
53 yo AAM  with h/o HTN and ETOH abuse brought in by wife for generalized weakness and dark urine; pt  found to have lactic acidosis (8.8), possible RICHAR with Hb 2.9 and repeat 2.4. In the ER pt was hemodynamically stable    Patient improving. Some recovery of H/H following a total of 9 UNITS of PRBC  this admission.  Hematologic derangement's are most likely multifactorial: Acute blood loss anemia into left leg Hematoma, Active hemalosis, Porr clotting factor synthesis secondary to cirrhosis of the liver   Appreciate Hematology, Vascular, Renal, GI, ID consults as this is a multi desplinary case

## 2019-05-16 NOTE — PROGRESS NOTE ADULT - SUBJECTIVE AND OBJECTIVE BOX
HPI:  54 year old M HTN and ETOH abuse brought in by wife for generalized weakness and dark urine.  Wife reports patient has been having non-bloody non-bilious vomiting last week which stopped his drinking on Friday 5/3.  Wife also notes he has been having easy bruising and L leg swelling over the same amount of time.  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  Wife subsequently added that he has been having weakness and fatigue since early March with episodes of gum bleeding going back to early January.  Of note, wife noted that his skin color has changed over the past week.  Pt denies fevers, chills, eye pain vision changes, (08 May 2019 23:01)  Patient is a 54y old  Male who presents with a chief complaint of anemia/ (16 May 2019 13:16)      INTERVAL HPI/OVERNIGHT EVENTS: No acsute events overnight     MEDICATIONS  (STANDING):  chlordiazePOXIDE 10 milliGRAM(s) Oral three times a day  chlorhexidine 2% Cloths 1 Application(s) Topical daily  chlorhexidine 4% Liquid 1 Application(s) Topical <User Schedule>  folic acid 1 milliGRAM(s) Oral daily  furosemide    Tablet 40 milliGRAM(s) Oral two times a day  lactulose Syrup 10 Gram(s) Oral once  multivitamin 1 Tablet(s) Oral daily  pantoprazole    Tablet 40 milliGRAM(s) Oral before breakfast  phytonadione   Solution 5 milliGRAM(s) Oral once  piperacillin/tazobactam IVPB. 3.375 Gram(s) IV Intermittent every 8 hours  vancomycin  IVPB 1000 milliGRAM(s) IV Intermittent every 12 hours    MEDICATIONS  (PRN):  LORazepam   Injectable 2 milliGRAM(s) IV Push every 2 hours PRN CIWA-Ar score 8 or greater  oxyCODONE    5 mG/acetaminophen 325 mG 1 Tablet(s) Oral every 4 hours PRN Moderate Pain (4 - 6)  oxyCODONE    5 mG/acetaminophen 325 mG 2 Tablet(s) Oral every 6 hours PRN Severe Pain (7 - 10)  polyethylene glycol 3350 17 Gram(s) Oral daily PRN Constipation      Allergies    No Known Allergies    Intolerances        REVIEW OF SYSTEMS:  CONSTITUTIONAL:  mild tremor some fatigue   EYES: No eye pain, visual disturbances, or discharge  ENMT:  No difficulty hearing, tinnitus, vertigo; No sinus or throat pain  NECK: No pain or stiffness  RESPIRATORY: No cough, wheezing, chills or hemoptysis; No shortness of breath  CARDIOVASCULAR: No chest pain, palpitations, dizziness, or leg swelling  GASTROINTESTINAL: mild epigastric pain. No nausea, vomiting, or hematemesis; No diarrhea or constipation. No melena or hematochezia.  GENITOURINARY: No dysuria, frequency, hematuria, or incontinence  NEUROLOGICAL: No headaches, memory loss, loss of strength, numbness, or tremors  SKIN: No itching, burning, rashes, or lesions   LYMPH NODES: No enlarged glands  ENDOCRINE: No heat or cold intolerance; No hair loss  MUSCULOSKELETAL: left leg pain   PSYCHIATRIC: No depression, anxiety, mood swings, or difficulty sleeping  HEME/LYMPH: No easy bruising, or bleeding gums  ALLERGY AND IMMUNOLOGIC: No hives or eczema    Vital Signs Last 24 Hrs  T(C): 35.7 (16 May 2019 12:00), Max: 36.9 (15 May 2019 23:09)  T(F): 96.2 (16 May 2019 12:00), Max: 98.4 (15 May 2019 23:09)  HR: 74 (16 May 2019 12:00) (74 - 90)  BP: 96/62 (16 May 2019 12:00) (96/62 - 126/70)  BP(mean): --  RR: 18 (16 May 2019 12:00) (16 - 18)  SpO2: 99% (16 May 2019 12:00) (97% - 100%)    PHYSICAL EXAM:  GENERAL: NAD, well-groomed, well-developed  HEAD:  Atraumatic, Normocephalic  EYES: EOMI, PERRLA, conjunctiva and sclera clear  ENMT: No tonsillar erythema, exudates, or enlargement; Moist mucous membranes, Good dentition, No lesions  NECK: Supple, No JVD, Normal thyroid  NERVOUS SYSTEM:  Alert & Oriented X3, Good concentration; Motor Strength 5/5 B/L upper and lower extremities; DTRs 2+ intact and symmetric  CHEST/LUNG: Clear to percussion bilaterally; No rales, rhonchi, wheezing, or rubs  HEART: Regular rate and rhythm; No murmurs, rubs, or gallops  ABDOMEN: Soft, Nontender, Nondistended; Bowel sounds present  EXTREMITIES:  left leg bruising and hematoma   LYMPH: No lymphadenopathy noted  SKIN:  Jaundice LABS:                        7.5    10.16 )-----------( 166      ( 16 May 2019 07:02 )             22.9     05-16    142  |  109<H>  |  30<H>  ----------------------------<  120<H>  3.3<L>   |  22  |  1.12    Ca    8.7      16 May 2019 07:02  Phos  3.1     05-16  Mg     1.7     05-16    TPro  7.6  /  Alb  2.1<L>  /  TBili  10.1<H>  /  DBili  6.48<H>  /  AST  112<H>  /  ALT  21  /  AlkPhos  108  05-16    PT/INR - ( 16 May 2019 07:02 )   PT: 21.3 sec;   INR: 1.87 ratio         PTT - ( 16 May 2019 07:02 )  PTT:29.6 sec    CAPILLARY BLOOD GLUCOSE          RADIOLOGY & ADDITIONAL TESTS:  < from: MR Femur w/ IV Cont, Left (05.15.19 @ 09:12) >  mpression:    No internal enhancement within the previously noted intermediate to   hyperintense T1 and STIR collection along the posterior lateral aspect of   the distal femur. This is likely related to a hematoma. Superimposed   infection cannot be excluded on an imaging basis. Follow-up to resolution   of this collection is recommended to exclude any occult lesion.    Extensive cellulitis about the imaged left femur. Patchy enhancement   within the left abductor and hamstring musculature corresponding to   regions of increased or signal which may be related to nonspecific   myositis or muscle strain    < end of copied text >    Imaging Personally Reviewed:  [X ] YES  [ ] NO    Consultant(s) Notes Reviewed:  [ X] YES  [ ] NO    Care Discussed with Consultants/Other Providers [ X] YES  [ ] NO

## 2019-05-17 LAB
ALBUMIN SERPL ELPH-MCNC: 2 G/DL — LOW (ref 3.3–5)
ALP SERPL-CCNC: 89 U/L — SIGNIFICANT CHANGE UP (ref 40–120)
ALT FLD-CCNC: 17 U/L — SIGNIFICANT CHANGE UP (ref 12–78)
AMMONIA BLD-MCNC: 10 UMOL/L — LOW (ref 11–32)
ANA TITR SER: NEGATIVE — SIGNIFICANT CHANGE UP
ANION GAP SERPL CALC-SCNC: 10 MMOL/L — SIGNIFICANT CHANGE UP (ref 5–17)
AST SERPL-CCNC: 84 U/L — HIGH (ref 15–37)
B PERT IGG+IGM PNL SER: CLEAR — SIGNIFICANT CHANGE UP
BILIRUB DIRECT SERPL-MCNC: 4.89 MG/DL — HIGH (ref 0.05–0.2)
BILIRUB INDIRECT FLD-MCNC: 4.3 MG/DL — HIGH (ref 0.2–1)
BILIRUB SERPL-MCNC: 9.2 MG/DL — HIGH (ref 0.2–1.2)
BUN SERPL-MCNC: 32 MG/DL — HIGH (ref 7–23)
CALCIUM SERPL-MCNC: 8.4 MG/DL — LOW (ref 8.5–10.1)
CHLORIDE SERPL-SCNC: 111 MMOL/L — HIGH (ref 96–108)
CO2 SERPL-SCNC: 22 MMOL/L — SIGNIFICANT CHANGE UP (ref 22–31)
COLOR FLD: YELLOW — SIGNIFICANT CHANGE UP
CREAT SERPL-MCNC: 1.28 MG/DL — SIGNIFICANT CHANGE UP (ref 0.5–1.3)
FLUID INTAKE SUBSTANCE CLASS: SIGNIFICANT CHANGE UP
FLUID SEGMENTED GRANULOCYTES: 17 % — SIGNIFICANT CHANGE UP
GLUCOSE SERPL-MCNC: 128 MG/DL — HIGH (ref 70–99)
HCT VFR BLD CALC: 22.9 % — LOW (ref 39–50)
HCV AB S/CO SERPL IA: 0.28 S/CO — SIGNIFICANT CHANGE UP (ref 0–0.99)
HCV AB SERPL-IMP: SIGNIFICANT CHANGE UP
HGB BLD-MCNC: 7.5 G/DL — LOW (ref 13–17)
MAGNESIUM SERPL-MCNC: 1.8 MG/DL — SIGNIFICANT CHANGE UP (ref 1.6–2.6)
MCHC RBC-ENTMCNC: 32.2 PG — SIGNIFICANT CHANGE UP (ref 27–34)
MCHC RBC-ENTMCNC: 32.8 GM/DL — SIGNIFICANT CHANGE UP (ref 32–36)
MCV RBC AUTO: 98.3 FL — SIGNIFICANT CHANGE UP (ref 80–100)
MONOS+MACROS # FLD: 83 % — SIGNIFICANT CHANGE UP
NRBC # BLD: 0 /100 WBCS — SIGNIFICANT CHANGE UP (ref 0–0)
PHOSPHATE SERPL-MCNC: 3.7 MG/DL — SIGNIFICANT CHANGE UP (ref 2.5–4.5)
PLATELET # BLD AUTO: 171 K/UL — SIGNIFICANT CHANGE UP (ref 150–400)
POTASSIUM SERPL-MCNC: 4.2 MMOL/L — SIGNIFICANT CHANGE UP (ref 3.5–5.3)
POTASSIUM SERPL-SCNC: 4.2 MMOL/L — SIGNIFICANT CHANGE UP (ref 3.5–5.3)
PROT SERPL-MCNC: 7.6 GM/DL — SIGNIFICANT CHANGE UP (ref 6–8.3)
RBC # BLD: 2.33 M/UL — LOW (ref 4.2–5.8)
RBC # FLD: 25.3 % — HIGH (ref 10.3–14.5)
RCV VOL RI: 1000 /UL — HIGH (ref 0–5)
SODIUM SERPL-SCNC: 143 MMOL/L — SIGNIFICANT CHANGE UP (ref 135–145)
TOTAL NUCLEATED CELL COUNT, BODY FLUID: 115 /UL — HIGH (ref 0–5)
TUBE TYPE: SIGNIFICANT CHANGE UP
WBC # BLD: 6.91 K/UL — SIGNIFICANT CHANGE UP (ref 3.8–10.5)
WBC # FLD AUTO: 6.91 K/UL — SIGNIFICANT CHANGE UP (ref 3.8–10.5)

## 2019-05-17 PROCEDURE — 99233 SBSQ HOSP IP/OBS HIGH 50: CPT

## 2019-05-17 PROCEDURE — 88112 CYTOPATH CELL ENHANCE TECH: CPT | Mod: 26

## 2019-05-17 PROCEDURE — 49083 ABD PARACENTESIS W/IMAGING: CPT

## 2019-05-17 PROCEDURE — 88305 TISSUE EXAM BY PATHOLOGIST: CPT | Mod: 26

## 2019-05-17 RX ADMIN — PIPERACILLIN AND TAZOBACTAM 25 GRAM(S): 4; .5 INJECTION, POWDER, LYOPHILIZED, FOR SOLUTION INTRAVENOUS at 22:27

## 2019-05-17 RX ADMIN — Medication 40 MILLIGRAM(S): at 18:10

## 2019-05-17 RX ADMIN — Medication 10 MILLIGRAM(S): at 14:19

## 2019-05-17 RX ADMIN — Medication 1 MILLIGRAM(S): at 11:17

## 2019-05-17 RX ADMIN — Medication 125 MILLIGRAM(S): at 06:26

## 2019-05-17 RX ADMIN — Medication 40 MILLIGRAM(S): at 06:20

## 2019-05-17 RX ADMIN — PIPERACILLIN AND TAZOBACTAM 25 GRAM(S): 4; .5 INJECTION, POWDER, LYOPHILIZED, FOR SOLUTION INTRAVENOUS at 06:21

## 2019-05-17 RX ADMIN — Medication 250 MILLIGRAM(S): at 06:21

## 2019-05-17 RX ADMIN — Medication 10 MILLIGRAM(S): at 06:20

## 2019-05-17 RX ADMIN — Medication 10 MILLIGRAM(S): at 22:27

## 2019-05-17 RX ADMIN — LACTULOSE 10 GRAM(S): 10 SOLUTION ORAL at 06:20

## 2019-05-17 RX ADMIN — PANTOPRAZOLE SODIUM 40 MILLIGRAM(S): 20 TABLET, DELAYED RELEASE ORAL at 07:30

## 2019-05-17 RX ADMIN — LACTULOSE 10 GRAM(S): 10 SOLUTION ORAL at 18:10

## 2019-05-17 RX ADMIN — Medication 250 MILLIGRAM(S): at 18:10

## 2019-05-17 RX ADMIN — Medication 1 TABLET(S): at 11:18

## 2019-05-17 RX ADMIN — PIPERACILLIN AND TAZOBACTAM 25 GRAM(S): 4; .5 INJECTION, POWDER, LYOPHILIZED, FOR SOLUTION INTRAVENOUS at 14:19

## 2019-05-17 RX ADMIN — CHLORHEXIDINE GLUCONATE 1 APPLICATION(S): 213 SOLUTION TOPICAL at 06:20

## 2019-05-17 NOTE — PROCEDURE NOTE - ADDITIONAL PROCEDURE DETAILS
pt s/p diagnostic/therapeutic paracentesis with 1200mLs of serous fluid aspirated and sent to lab for analysis.  Pt had small volume ascites.  Pt has a h/o of ETOH Cirrhosis and anemia.  Pt tolerated procedure well.  Vitals remained stable  -f/u ascitic fluid results

## 2019-05-17 NOTE — PROGRESS NOTE ADULT - SUBJECTIVE AND OBJECTIVE BOX
Patient is a 54y old  Male who presents with a chief complaint of anemia/ (16 May 2019 13:59)      HPI:  54 year old M HTN and ETOH abuse brought in by wife for generalized weakness and dark urine.  Wife reports patient has been having non-bloody non-bilious vomiting last week which stopped his drinking on Friday 5/3.  Wife also notes he has been having easy bruising and L leg swelling over the same amount of time.  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  Wife subsequently added that he has been having weakness and fatigue since early March with episodes of gum bleeding going back to early January.  Of note, wife noted that his skin color has changed over the past week.  Pt denies fevers, chills, eye pain vision changes, (08 May 2019 23:01)      INTERVAL HPI/OVERNIGHT EVENTS:  Less lethargic this AM. Patient started on lactulose . Librium was held. The patient denies melena, hematochezia, hematemesis, nausea, vomiting, abdominal pain, constipation, diarrhea, or change in bowel movements     MEDICATIONS  (STANDING):  chlordiazePOXIDE 10 milliGRAM(s) Oral three times a day  chlorhexidine 2% Cloths 1 Application(s) Topical daily  chlorhexidine 4% Liquid 1 Application(s) Topical <User Schedule>  folic acid 1 milliGRAM(s) Oral daily  furosemide    Tablet 40 milliGRAM(s) Oral two times a day  lactulose Syrup 10 Gram(s) Oral two times a day  methylPREDNISolone sodium succinate Injectable 125 milliGRAM(s) IV Push daily  multivitamin 1 Tablet(s) Oral daily  pantoprazole    Tablet 40 milliGRAM(s) Oral before breakfast  piperacillin/tazobactam IVPB. 3.375 Gram(s) IV Intermittent every 8 hours  vancomycin  IVPB 1000 milliGRAM(s) IV Intermittent every 12 hours    MEDICATIONS  (PRN):  oxyCODONE    5 mG/acetaminophen 325 mG 1 Tablet(s) Oral every 4 hours PRN Moderate Pain (4 - 6)  oxyCODONE    5 mG/acetaminophen 325 mG 2 Tablet(s) Oral every 6 hours PRN Severe Pain (7 - 10)  polyethylene glycol 3350 17 Gram(s) Oral daily PRN Constipation      FAMILY HISTORY:      Allergies    No Known Allergies    Intolerances        PMH/PSH:  Benign essential HTN        REVIEW OF SYSTEMS:  CONSTITUTIONAL: No fever, weight loss, or fatigue  EYES: No eye pain, visual disturbances, or discharge  ENMT:  No difficulty hearing, tinnitus, vertigo; No sinus or throat pain  NECK: No pain or stiffness  BREASTS: No pain, masses, or nipple discharge  RESPIRATORY: No cough, wheezing, chills or hemoptysis; No shortness of breath  CARDIOVASCULAR: No chest pain, palpitations, dizziness, or leg swelling  GASTROINTESTINAL: See above  GENITOURINARY: No dysuria, frequency, hematuria, or incontinence  NEUROLOGICAL: No headaches, memory loss, loss of strength, numbness, or tremors  SKIN: No itching, burning, rashes, or lesions   LYMPH NODES: No enlarged glands  ENDOCRINE: No heat or cold intolerance; No hair loss  MUSCULOSKELETAL: No joint pain or swelling; No muscle, back, or extremity pain  PSYCHIATRIC: No depression, anxiety, mood swings, or difficulty sleeping  HEME/LYMPH: No easy bruising, or bleeding gums  ALLERGY AND IMMUNOLOGIC: No hives or eczema    Vital Signs Last 24 Hrs  T(C): 36.1 (17 May 2019 05:16), Max: 36.6 (16 May 2019 23:19)  T(F): 96.9 (17 May 2019 05:16), Max: 97.8 (16 May 2019 23:19)  HR: 60 (17 May 2019 05:16) (60 - 74)  BP: 107/57 (17 May 2019 05:16) (96/62 - 119/64)  BP(mean): --  RR: 17 (17 May 2019 05:16) (16 - 18)  SpO2: 99% (17 May 2019 05:16) (98% - 100%)    PHYSICAL EXAM:  GENERAL: NAD, well-groomed, well-developed  HEAD:  Atraumatic, Normocephalic  EYES: EOMI, PERRLA, conjunctiva and sclera clear  NECK: Supple, No JVD, Normal thyroid  NERVOUS SYSTEM:  Alert & Oriented X 2, Fair concentration;   CHEST/LUNG: Clear to percussion bilaterally; No rales, rhonchi, wheezing, or rubs  HEART: Regular rate and rhythm; No murmurs, rubs, or gallops  ABDOMEN: Soft, Nontender, Nondistended; Bowel sounds present  EXTREMITIES:  2+ Peripheral Pulses, No clubbing, cyanosis, or edema  LYMPH: No lymphadenopathy noted  SKIN: No rashes or lesions    LAB                          7.5    6.91  )-----------( 171      ( 17 May 2019 06:52 )             22.9       CBC:  05-17 @ 06:52  WBC 6.91   Hgb 7.5   Hct 22.9   Plts 171  MCV 98.3  05-16 @ 07:02  WBC 10.16   Hgb 7.5   Hct 22.9   Plts 166  MCV 96.6  05-15 @ 07:36  WBC 8.31   Hgb 8.1   Hct 25.0   Plts 154  MCV 96.2  05-14 @ 07:01  WBC 6.63   Hgb 7.2   Hct 21.9   Plts 122  MCV 94.0  05-13 @ 08:01  WBC 6.88   Hgb 5.9   Hct 18.0   Plts 132  MCV 94.2  05-12 @ 08:31  WBC 7.32   Hgb 6.2   Hct 18.7   Plts 101  MCV 94.0  05-11 @ 10:36  WBC 4.81   Hgb 7.3   Hct 21.8   Plts 90  MCV 92.4  05-11 @ 00:28  WBC 6.45   Hgb 6.6   Hct 19.5   Plts 82  MCV 93.8      Chemistry:  05-17 @ 06:52  Na+ 143  K+ 4.2  Cl- 111  CO2 22  BUN 32  Cr 1.28     05-16 @ 07:02  Na+ 142  K+ 3.3  Cl- 109  CO2 22  BUN 30  Cr 1.12     05-15 @ 07:36  Na+ 142  K+ 3.7  Cl- 110  CO2 23  BUN 33  Cr 1.25     05-14 @ 07:01  Na+ 141  K+ 3.9  Cl- 108  CO2 21  BUN 36  Cr 1.48     05-13 @ 08:01  Na+ 139  K+ 3.9  Cl- 107  CO2 22  BUN 39  Cr 1.68     05-12 @ 08:31  Na+ 138  K+ 3.8  Cl- 105  CO2 23  BUN 39  Cr 1.71     05-11 @ 10:36  Na+ 137  K+ 3.8  Cl- 104  CO2 21  BUN 35  Cr 2.08         Glucose, Serum: 128 mg/dL (05-17 @ 06:52)  Glucose, Serum: 120 mg/dL (05-16 @ 07:02)  Glucose, Serum: 92 mg/dL (05-15 @ 07:36)  Glucose, Serum: 120 mg/dL (05-14 @ 07:01)  Glucose, Serum: 113 mg/dL (05-13 @ 08:01)  Glucose, Serum: 108 mg/dL (05-12 @ 08:31)  Glucose, Serum: 170 mg/dL (05-11 @ 10:36)      17 May 2019 06:52    143    |  111    |  32     ----------------------------<  128    4.2     |  22     |  1.28   16 May 2019 07:02    142    |  109    |  30     ----------------------------<  120    3.3     |  22     |  1.12   15 May 2019 07:36    142    |  110    |  33     ----------------------------<  92     3.7     |  23     |  1.25   14 May 2019 07:01    141    |  108    |  36     ----------------------------<  120    3.9     |  21     |  1.48   13 May 2019 08:01    139    |  107    |  39     ----------------------------<  113    3.9     |  22     |  1.68   12 May 2019 08:31    138    |  105    |  39     ----------------------------<  108    3.8     |  23     |  1.71   11 May 2019 10:36    137    |  104    |  35     ----------------------------<  170    3.8     |  21     |  2.08     Ca    8.4        17 May 2019 06:52  Ca    8.7        16 May 2019 07:02  Ca    8.5        15 May 2019 07:36  Ca    8.2        14 May 2019 07:01  Ca    8.3        13 May 2019 08:01  Ca    8.2        12 May 2019 08:31  Ca    7.9        11 May 2019 10:36  Phos  3.7       17 May 2019 06:52  Phos  3.1       16 May 2019 07:02  Phos  2.9       15 May 2019 07:36  Phos  2.9       14 May 2019 07:01  Mg     1.8       17 May 2019 06:52  Mg     1.7       16 May 2019 07:02  Mg     1.8       15 May 2019 07:36  Mg     1.7       14 May 2019 07:01    TPro  7.6    /  Alb  2.0    /  TBili  9.2    /  DBili  4.89   /  AST  84     /  ALT  17     /  AlkPhos  89     17 May 2019 06:52  TPro  7.6    /  Alb  2.1    /  TBili  10.1   /  DBili  6.48   /  AST  112    /  ALT  21     /  AlkPhos  108    16 May 2019 07:02  TPro  x      /  Alb  x      /  TBili  8.4    /  DBili  x      /  AST  x      /  ALT  x      /  AlkPhos  x      15 May 2019 07:37  TPro  7.6    /  Alb  1.9    /  TBili  8.4    /  DBili  4.68   /  AST  81     /  ALT  17     /  AlkPhos  127    15 May 2019 07:36  TPro  7.0    /  Alb  1.8    /  TBili  8.2    /  DBili  x      /  AST  64     /  ALT  12     /  AlkPhos  117    14 May 2019 07:01  TPro  6.5    /  Alb  1.7    /  TBili  8.2    /  DBili  4.12   /  AST  66     /  ALT  11     /  AlkPhos  114    13 May 2019 08:01  TPro  6.7    /  Alb  1.8    /  TBili  8.0    /  DBili  4.11   /  AST  63     /  ALT  13     /  AlkPhos  79     12 May 2019 08:31      PT/INR - ( 16 May 2019 07:02 )   PT: 21.3 sec;   INR: 1.87 ratio         PTT - ( 16 May 2019 07:02 )  PTT:29.6 sec        CAPILLARY BLOOD GLUCOSE              RADIOLOGY & ADDITIONAL TESTS:    Imaging Personally Reviewed:  [ ] YES  [ ] NO    Consultant(s) Notes Reviewed:  [ ] YES  [ ] NO    Care Discussed with Consultants/Other Providers [ ] YES  [ ] NO

## 2019-05-17 NOTE — PROGRESS NOTE ADULT - ASSESSMENT
53 yo AAM  with h/o HTN and ETOH abuse brought in by wife for generalized weakness and dark urine; pt  found to have lactic acidosis (8.8), possible RICHAR with Hb 2.9 and repeat 2.4. In the ER pt was hemodynamically stable    Patient improving. Some recovery of H/H following a total of 9 UNITS of PRBC  this admission.  Hematologic derangement's are most likely multifactorial: Acute blood loss anemia into left leg Hematoma, Active hemalosis, Poor clotting factor synthesis secondary to cirrhosis of the liver   Appreciate Hematology, Vascular, Renal, GI, ID consults as this is a multi desplinary case

## 2019-05-17 NOTE — PROGRESS NOTE ADULT - SUBJECTIVE AND OBJECTIVE BOX
INTERVAL History:  Weak  Jaundice  Hemolysis    Allergies    No Known Allergies    Intolerances        MEDICATIONS  (STANDING):  chlordiazePOXIDE 10 milliGRAM(s) Oral three times a day  chlorhexidine 2% Cloths 1 Application(s) Topical daily  chlorhexidine 4% Liquid 1 Application(s) Topical <User Schedule>  folic acid 1 milliGRAM(s) Oral daily  furosemide    Tablet 40 milliGRAM(s) Oral two times a day  lactulose Syrup 10 Gram(s) Oral two times a day  methylPREDNISolone sodium succinate Injectable 125 milliGRAM(s) IV Push daily  multivitamin 1 Tablet(s) Oral daily  pantoprazole    Tablet 40 milliGRAM(s) Oral before breakfast  piperacillin/tazobactam IVPB. 3.375 Gram(s) IV Intermittent every 8 hours  vancomycin  IVPB 1000 milliGRAM(s) IV Intermittent every 12 hours    MEDICATIONS  (PRN):  oxyCODONE    5 mG/acetaminophen 325 mG 1 Tablet(s) Oral every 4 hours PRN Moderate Pain (4 - 6)  oxyCODONE    5 mG/acetaminophen 325 mG 2 Tablet(s) Oral every 6 hours PRN Severe Pain (7 - 10)  polyethylene glycol 3350 17 Gram(s) Oral daily PRN Constipation      Vital Signs Last 24 Hrs  T(C): 36.1 (17 May 2019 05:16), Max: 36.6 (16 May 2019 23:19)  T(F): 96.9 (17 May 2019 05:16), Max: 97.8 (16 May 2019 23:19)  HR: 60 (17 May 2019 05:16) (60 - 74)  BP: 107/57 (17 May 2019 05:16) (96/62 - 119/64)  BP(mean): --  RR: 17 (17 May 2019 05:16) (16 - 18)  SpO2: 99% (17 May 2019 05:16) (98% - 100%)    PHYSICAL EXAM:    Icteric  EYES: Yellow  NECK: Supple, No JVD, Normal thyroid  CHEST/LUNG: Clear to percussion bilaterally; No rales, rhonchi,   HEART: Regular rate and rhythm;   ABDOMEN: Acites  Edema:- +++        LABS:                        7.5    6.91  )-----------( 171      ( 17 May 2019 06:52 )             22.9     05-17    143  |  111<H>  |  32<H>  ----------------------------<  128<H>  4.2   |  22  |  1.28    Ca    8.4<L>      17 May 2019 06:52  Phos  3.7     05-17  Mg     1.8     05-17    TPro  7.6  /  Alb  2.0<L>  /  TBili  9.2<H>  /  DBili  4.89<H>  /  AST  84<H>  /  ALT  17  /  AlkPhos  89  05-17    PT/INR - ( 16 May 2019 07:02 )   PT: 21.3 sec;   INR: 1.87 ratio         PTT - ( 16 May 2019 07:02 )  PTT:29.6 sec        RADIOLOGY & ADDITIONAL STUDIES:    PATHOLOGY:

## 2019-05-17 NOTE — PROGRESS NOTE ADULT - SUBJECTIVE AND OBJECTIVE BOX
Patient feels ok no distress;    MEDICATIONS  (STANDING):  chlordiazePOXIDE 10 milliGRAM(s) Oral three times a day  chlorhexidine 2% Cloths 1 Application(s) Topical daily  chlorhexidine 4% Liquid 1 Application(s) Topical <User Schedule>  folic acid 1 milliGRAM(s) Oral daily  furosemide    Tablet 40 milliGRAM(s) Oral two times a day  lactulose Syrup 10 Gram(s) Oral two times a day  methylPREDNISolone sodium succinate Injectable 125 milliGRAM(s) IV Push daily  multivitamin 1 Tablet(s) Oral daily  pantoprazole    Tablet 40 milliGRAM(s) Oral before breakfast  piperacillin/tazobactam IVPB. 3.375 Gram(s) IV Intermittent every 8 hours  vancomycin  IVPB 1000 milliGRAM(s) IV Intermittent every 12 hours    MEDICATIONS  (PRN):  oxyCODONE    5 mG/acetaminophen 325 mG 1 Tablet(s) Oral every 4 hours PRN Moderate Pain (4 - 6)  oxyCODONE    5 mG/acetaminophen 325 mG 2 Tablet(s) Oral every 6 hours PRN Severe Pain (7 - 10)  polyethylene glycol 3350 17 Gram(s) Oral daily PRN Constipation      05-16-19 @ 07:01  -  05-17-19 @ 07:00  --------------------------------------------------------  IN: 930 mL / OUT: 0 mL / NET: 930 mL    05-17-19 @ 07:01  -  05-17-19 @ 14:54  --------------------------------------------------------  IN: 480 mL / OUT: 0 mL / NET: 480 mL      PHYSICAL EXAM:      T(C): 36 (05-17-19 @ 11:05), Max: 36.6 (05-16-19 @ 23:19)  HR: 89 (05-17-19 @ 12:05) (60 - 89)  BP: 103/67 (05-17-19 @ 12:05) (98/58 - 119/64)  RR: 18 (05-17-19 @ 12:05) (16 - 18)  SpO2: 99% (05-17-19 @ 12:05) (98% - 100%)  Wt(kg): --  Respiratory: clear anteriorly, decreased BS at bases  Cardiovascular: S1 S2  Gastrointestinal: soft NT ND +BS  Extremities:  2-3  edema                                    7.5    6.91  )-----------( 171      ( 17 May 2019 06:52 )             22.9     05-17    143  |  111<H>  |  32<H>  ----------------------------<  128<H>  4.2   |  22  |  1.28    Ca    8.4<L>      17 May 2019 06:52  Phos  3.7     05-17  Mg     1.8     05-17    TPro  7.6  /  Alb  2.0<L>  /  TBili  9.2<H>  /  DBili  4.89<H>  /  AST  84<H>  /  ALT  17  /  AlkPhos  89  05-17      LIVER FUNCTIONS - ( 17 May 2019 06:52 )  Alb: 2.0 g/dL / Pro: 7.6 gm/dL / ALK PHOS: 89 U/L / ALT: 17 U/L / AST: 84 U/L / GGT: x           Creatinine Trend: 1.28<--, 1.12<--, 1.25<--, 1.48<--, 1.68<--, 1.71<--    Assessment and Plan:  RICHAR risk  for pigment ATN; LDH elevated with dark urine on admission, now improving;   Edema persists, judicious diuretic rx; trend creatinine;   Will follow.

## 2019-05-17 NOTE — PROGRESS NOTE ADULT - SUBJECTIVE AND OBJECTIVE BOX
HPI:  54 year old M HTN and ETOH abuse brought in by wife for generalized weakness and dark urine.  Wife reports patient has been having non-bloody non-bilious vomiting last week which stopped his drinking on Friday 5/3.  Wife also notes he has been having easy bruising and L leg swelling over the same amount of time.  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  Wife subsequently added that he has been having weakness and fatigue since early March with episodes of gum bleeding going back to early January.  Of note, wife noted that his skin color has changed over the past week.  Pt denies fevers, chills, eye pain vision changes, (08 May 2019 23:01)      Patient is a 54y old  Male who presents with a chief complaint of anemia/ (17 May 2019 14:54)      INTERVAL HPI/OVERNIGHT EVENTS:    MEDICATIONS  (STANDING):  chlordiazePOXIDE 10 milliGRAM(s) Oral three times a day  chlorhexidine 2% Cloths 1 Application(s) Topical daily  chlorhexidine 4% Liquid 1 Application(s) Topical <User Schedule>  folic acid 1 milliGRAM(s) Oral daily  furosemide    Tablet 40 milliGRAM(s) Oral two times a day  lactulose Syrup 10 Gram(s) Oral two times a day  methylPREDNISolone sodium succinate Injectable 125 milliGRAM(s) IV Push daily  multivitamin 1 Tablet(s) Oral daily  pantoprazole    Tablet 40 milliGRAM(s) Oral before breakfast  piperacillin/tazobactam IVPB. 3.375 Gram(s) IV Intermittent every 8 hours  vancomycin  IVPB 1000 milliGRAM(s) IV Intermittent every 12 hours    MEDICATIONS  (PRN):  oxyCODONE    5 mG/acetaminophen 325 mG 1 Tablet(s) Oral every 4 hours PRN Moderate Pain (4 - 6)  oxyCODONE    5 mG/acetaminophen 325 mG 2 Tablet(s) Oral every 6 hours PRN Severe Pain (7 - 10)  polyethylene glycol 3350 17 Gram(s) Oral daily PRN Constipation      Allergies    No Known Allergies    Intolerances        REVIEW OF SYSTEMS:  CONSTITUTIONAL: No fever, weight loss, or fatigue  EYES: No eye pain, visual disturbances, or discharge  ENMT:  No difficulty hearing, tinnitus, vertigo; No sinus or throat pain  NECK: No pain or stiffness  BREASTS: No pain, masses, or nipple discharge  RESPIRATORY: No cough, wheezing, chills or hemoptysis; No shortness of breath  CARDIOVASCULAR: No chest pain, palpitations, dizziness, or leg swelling  GASTROINTESTINAL: No abdominal or epigastric pain. No nausea, vomiting, or hematemesis; No diarrhea or constipation. No melena or hematochezia.  GENITOURINARY: No dysuria, frequency, hematuria, or incontinence  NEUROLOGICAL: No headaches, memory loss, loss of strength, numbness, or tremors  SKIN: No itching, burning, rashes, or lesions   LYMPH NODES: No enlarged glands  ENDOCRINE: No heat or cold intolerance; No hair loss  MUSCULOSKELETAL: No joint pain or swelling; No muscle, back, or extremity pain  PSYCHIATRIC: No depression, anxiety, mood swings, or difficulty sleeping  HEME/LYMPH: No easy bruising, or bleeding gums  ALLERGY AND IMMUNOLOGIC: No hives or eczema    Vital Signs Last 24 Hrs  T(C): 36 (17 May 2019 17:15), Max: 36.6 (16 May 2019 23:19)  T(F): 96.8 (17 May 2019 17:15), Max: 97.8 (16 May 2019 23:19)  HR: 96 (17 May 2019 17:15) (60 - 96)  BP: 102/64 (17 May 2019 17:15) (98/58 - 113/61)  BP(mean): --  RR: 18 (17 May 2019 17:15) (16 - 18)  SpO2: 100% (17 May 2019 17:15) (98% - 100%)    PHYSICAL EXAM:  GENERAL: NAD, well-groomed, well-developed  HEAD:  Atraumatic, Normocephalic  EYES: EOMI, PERRLA, conjunctiva and sclera clear  ENMT: No tonsillar erythema, exudates, or enlargement; Moist mucous membranes, Good dentition, No lesions  NECK: Supple, No JVD, Normal thyroid  NERVOUS SYSTEM:  Alert & Oriented X3, Good concentration; Motor Strength 5/5 B/L upper and lower extremities; DTRs 2+ intact and symmetric  CHEST/LUNG: Clear to percussion bilaterally; No rales, rhonchi, wheezing, or rubs  HEART: Regular rate and rhythm; No murmurs, rubs, or gallops  ABDOMEN: Soft, Nontender, Nondistended; Bowel sounds present  EXTREMITIES:  2+ Peripheral Pulses, No clubbing, cyanosis, or edema  LYMPH: No lymphadenopathy noted  SKIN: No rashes or lesions    LABS:                        7.5    6.91  )-----------( 171      ( 17 May 2019 06:52 )             22.9     05-17    143  |  111<H>  |  32<H>  ----------------------------<  128<H>  4.2   |  22  |  1.28    Ca    8.4<L>      17 May 2019 06:52  Phos  3.7     05-17  Mg     1.8     05-17    TPro  7.6  /  Alb  2.0<L>  /  TBili  9.2<H>  /  DBili  4.89<H>  /  AST  84<H>  /  ALT  17  /  AlkPhos  89  05-17    PT/INR - ( 16 May 2019 07:02 )   PT: 21.3 sec;   INR: 1.87 ratio         PTT - ( 16 May 2019 07:02 )  PTT:29.6 sec    CAPILLARY BLOOD GLUCOSE          RADIOLOGY & ADDITIONAL TESTS:    Imaging Personally Reviewed:  [ x] YES  [ ] NO    Consultant(s) Notes Reviewed:  [ X] YES  [ ] NO    Care Discussed with Consultants/Other Providers [X ] YES  [ ] NO

## 2019-05-17 NOTE — PROGRESS NOTE ADULT - PROBLEM SELECTOR PLAN 2
8.4  ( 4.68 ) / 81 / 17 /  127 .==> 10.1 / 11.2 / 21 / 108  ==> 9.2 / 84 / 17 / 89 better, Less lethargic ( Ammonia 10 )  Lethargy probably secondary to librium. Unlikely to be from hepatic encepahlopathy ( Ammonia 10 )  elevated LFTS probably secondary to hemolysis, meds ( eg Librium which is now on hold  ) and underlying cirrhosis . Viral studies negative. CT scan / Sono [id number] essentially negative.  On lactulose.

## 2019-05-18 LAB
ALBUMIN FLD-MCNC: 0.6 G/DL — SIGNIFICANT CHANGE UP
ANION GAP SERPL CALC-SCNC: 8 MMOL/L — SIGNIFICANT CHANGE UP (ref 5–17)
BUN SERPL-MCNC: 36 MG/DL — HIGH (ref 7–23)
CALCIUM SERPL-MCNC: 8.5 MG/DL — SIGNIFICANT CHANGE UP (ref 8.5–10.1)
CHLORIDE SERPL-SCNC: 111 MMOL/L — HIGH (ref 96–108)
CO2 SERPL-SCNC: 23 MMOL/L — SIGNIFICANT CHANGE UP (ref 22–31)
CREAT SERPL-MCNC: 1.24 MG/DL — SIGNIFICANT CHANGE UP (ref 0.5–1.3)
GLUCOSE FLD-MCNC: 173 MG/DL — SIGNIFICANT CHANGE UP
GLUCOSE SERPL-MCNC: 112 MG/DL — HIGH (ref 70–99)
GRAM STN FLD: SIGNIFICANT CHANGE UP
HCT VFR BLD CALC: 24.3 % — LOW (ref 39–50)
HGB BLD-MCNC: 8 G/DL — LOW (ref 13–17)
MCHC RBC-ENTMCNC: 32 PG — SIGNIFICANT CHANGE UP (ref 27–34)
MCHC RBC-ENTMCNC: 32.9 GM/DL — SIGNIFICANT CHANGE UP (ref 32–36)
MCV RBC AUTO: 97.2 FL — SIGNIFICANT CHANGE UP (ref 80–100)
NRBC # BLD: 0 /100 WBCS — SIGNIFICANT CHANGE UP (ref 0–0)
PLATELET # BLD AUTO: 197 K/UL — SIGNIFICANT CHANGE UP (ref 150–400)
POTASSIUM SERPL-MCNC: 4.3 MMOL/L — SIGNIFICANT CHANGE UP (ref 3.5–5.3)
POTASSIUM SERPL-SCNC: 4.3 MMOL/L — SIGNIFICANT CHANGE UP (ref 3.5–5.3)
PROT FLD-MCNC: 1.7 G/DL — SIGNIFICANT CHANGE UP
RBC # BLD: 2.5 M/UL — LOW (ref 4.2–5.8)
RBC # FLD: 25.4 % — HIGH (ref 10.3–14.5)
SODIUM SERPL-SCNC: 142 MMOL/L — SIGNIFICANT CHANGE UP (ref 135–145)
SPECIMEN SOURCE: SIGNIFICANT CHANGE UP
VANCOMYCIN TROUGH SERPL-MCNC: 17.6 UG/ML — SIGNIFICANT CHANGE UP (ref 10–20)
WBC # BLD: 9.63 K/UL — SIGNIFICANT CHANGE UP (ref 3.8–10.5)
WBC # FLD AUTO: 9.63 K/UL — SIGNIFICANT CHANGE UP (ref 3.8–10.5)

## 2019-05-18 PROCEDURE — 99233 SBSQ HOSP IP/OBS HIGH 50: CPT

## 2019-05-18 RX ADMIN — Medication 40 MILLIGRAM(S): at 18:15

## 2019-05-18 RX ADMIN — PIPERACILLIN AND TAZOBACTAM 25 GRAM(S): 4; .5 INJECTION, POWDER, LYOPHILIZED, FOR SOLUTION INTRAVENOUS at 21:47

## 2019-05-18 RX ADMIN — LACTULOSE 10 GRAM(S): 10 SOLUTION ORAL at 06:23

## 2019-05-18 RX ADMIN — PIPERACILLIN AND TAZOBACTAM 25 GRAM(S): 4; .5 INJECTION, POWDER, LYOPHILIZED, FOR SOLUTION INTRAVENOUS at 13:31

## 2019-05-18 RX ADMIN — Medication 40 MILLIGRAM(S): at 06:24

## 2019-05-18 RX ADMIN — OXYCODONE AND ACETAMINOPHEN 2 TABLET(S): 5; 325 TABLET ORAL at 14:45

## 2019-05-18 RX ADMIN — Medication 250 MILLIGRAM(S): at 18:13

## 2019-05-18 RX ADMIN — LACTULOSE 10 GRAM(S): 10 SOLUTION ORAL at 18:15

## 2019-05-18 RX ADMIN — Medication 1 TABLET(S): at 11:38

## 2019-05-18 RX ADMIN — Medication 10 MILLIGRAM(S): at 21:47

## 2019-05-18 RX ADMIN — Medication 1 MILLIGRAM(S): at 11:38

## 2019-05-18 RX ADMIN — CHLORHEXIDINE GLUCONATE 1 APPLICATION(S): 213 SOLUTION TOPICAL at 08:23

## 2019-05-18 RX ADMIN — Medication 250 MILLIGRAM(S): at 06:18

## 2019-05-18 RX ADMIN — Medication 10 MILLIGRAM(S): at 13:31

## 2019-05-18 RX ADMIN — Medication 10 MILLIGRAM(S): at 06:23

## 2019-05-18 RX ADMIN — PIPERACILLIN AND TAZOBACTAM 25 GRAM(S): 4; .5 INJECTION, POWDER, LYOPHILIZED, FOR SOLUTION INTRAVENOUS at 06:24

## 2019-05-18 RX ADMIN — PANTOPRAZOLE SODIUM 40 MILLIGRAM(S): 20 TABLET, DELAYED RELEASE ORAL at 08:23

## 2019-05-18 RX ADMIN — OXYCODONE AND ACETAMINOPHEN 2 TABLET(S): 5; 325 TABLET ORAL at 13:45

## 2019-05-18 RX ADMIN — Medication 125 MILLIGRAM(S): at 06:17

## 2019-05-18 NOTE — PROGRESS NOTE ADULT - ASSESSMENT
53 yo AAM  with h/o HTN and ETOH abuse brought in by wife for generalized weakness and dark urine; pt  found to have lactic acidosis (8.8), possible RICHAR with Hb 2.9 and repeat 2.4. In the ER pt was hemodynamically stable    Patient improving. Some recovery of H/H following a total of 9 UNITS of PRBC  this admission.  Hematologic derangement's are most likely multifactorial: Acute blood loss anemia into left leg Hematoma, Active hemalosis, Poor clotting factor synthesis secondary to cirrhosis of the liver   Appreciate Hematology, Vascular, Renal, GI, ID consults as this is a multi desplinary case       DC planning to rehab

## 2019-05-18 NOTE — PROGRESS NOTE ADULT - SUBJECTIVE AND OBJECTIVE BOX
HPI:  54 year old M HTN and ETOH abuse brought in by wife for generalized weakness and dark urine.  Wife reports patient has been having non-bloody non-bilious vomiting last week which stopped his drinking on Friday 5/3.  Wife also notes he has been having easy bruising and L leg swelling over the same amount of time.  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  Wife subsequently added that he has been having weakness and fatigue since early March with episodes of gum bleeding going back to early January.  Of note, wife noted that his skin color has changed over the past week.  Pt denies fevers, chills, eye pain vision changes, (08 May 2019 23:01)      Patient is a 54y old  Male who presents with a chief complaint of anemia/ (18 May 2019 11:05)      INTERVAL HPI/OVERNIGHT EVENTS: no acute events overnight     MEDICATIONS  (STANDING):  chlordiazePOXIDE 10 milliGRAM(s) Oral three times a day  chlorhexidine 2% Cloths 1 Application(s) Topical daily  chlorhexidine 4% Liquid 1 Application(s) Topical <User Schedule>  folic acid 1 milliGRAM(s) Oral daily  furosemide    Tablet 40 milliGRAM(s) Oral two times a day  lactulose Syrup 10 Gram(s) Oral two times a day  multivitamin 1 Tablet(s) Oral daily  pantoprazole    Tablet 40 milliGRAM(s) Oral before breakfast  piperacillin/tazobactam IVPB. 3.375 Gram(s) IV Intermittent every 8 hours  vancomycin  IVPB 1000 milliGRAM(s) IV Intermittent every 12 hours    MEDICATIONS  (PRN):  oxyCODONE    5 mG/acetaminophen 325 mG 1 Tablet(s) Oral every 4 hours PRN Moderate Pain (4 - 6)  oxyCODONE    5 mG/acetaminophen 325 mG 2 Tablet(s) Oral every 6 hours PRN Severe Pain (7 - 10)  polyethylene glycol 3350 17 Gram(s) Oral daily PRN Constipation      Allergies    No Known Allergies    Intolerances        REVIEW OF SYSTEMS:  CONSTITUTIONAL: No fever, weight loss, or fatigue  EYES: No eye pain, visual disturbances, or discharge  ENMT:  No difficulty hearing, tinnitus, vertigo; No sinus or throat pain  NECK: No pain or stiffness  BREASTS: No pain, masses, or nipple discharge  RESPIRATORY: No cough, wheezing, chills or hemoptysis; No shortness of breath  CARDIOVASCULAR: No chest pain, palpitations, dizziness, or leg swelling  GASTROINTESTINAL: No abdominal or epigastric pain. No nausea, vomiting, or hematemesis; No diarrhea or constipation. No melena or hematochezia.  GENITOURINARY: No dysuria, frequency, hematuria, or incontinence  NEUROLOGICAL: No headaches, memory loss, loss of strength, numbness, or tremors  SKIN: No itching, burning, rashes, or lesions   LYMPH NODES: No enlarged glands  ENDOCRINE: No heat or cold intolerance; No hair loss  MUSCULOSKELETAL: No joint pain or swelling; No muscle, back, or extremity pain  PSYCHIATRIC: No depression, anxiety, mood swings, or difficulty sleeping  HEME/LYMPH: No easy bruising, or bleeding gums  ALLERGY AND IMMUNOLOGIC: No hives or eczema    Vital Signs Last 24 Hrs  T(C): 36.2 (19 May 2019 05:00), Max: 36.4 (18 May 2019 23:26)  T(F): 97.2 (19 May 2019 05:00), Max: 97.6 (18 May 2019 23:26)  HR: 79 (19 May 2019 05:00) (79 - 81)  BP: 120/52 (19 May 2019 05:00) (120/52 - 124/65)  BP(mean): --  RR: 17 (19 May 2019 05:00) (17 - 19)  SpO2: 95% (19 May 2019 05:00) (95% - 99%)    PHYSICAL EXAM:  GENERAL: NAD, well-groomed, well-developed  HEAD:  Atraumatic, Normocephalic  EYES: EOMI, PERRLA, conjunctiva and sclera clear  ENMT: No tonsillar erythema, exudates, or enlargement; Moist mucous membranes, Good dentition, No lesions  NECK: Supple, No JVD, Normal thyroid  NERVOUS SYSTEM:  Alert & Oriented X3, Good concentration; Motor Strength 5/5 B/L upper and lower extremities; DTRs 2+ intact and symmetric  CHEST/LUNG: Clear to percussion bilaterally; No rales, rhonchi, wheezing, or rubs  HEART: Regular rate and rhythm; No murmurs, rubs, or gallops  ABDOMEN: Soft, Nontender, Nondistended; Bowel sounds present decreased ascitis   EXTREMITIES:  2+ Peripheral Pulses,  b/l edema   SKIN:  jaundice poor toenail care     LABS:                        7.9    9.61  )-----------( 202      ( 19 May 2019 06:41 )             24.7     05-19    144  |  110<H>  |  37<H>  ----------------------------<  126<H>  3.8   |  23  |  1.38<H>    Ca    8.5      19 May 2019 06:41      PT/INR - ( 19 May 2019 06:41 )   PT: 19.8 sec;   INR: 1.74 ratio             CAPILLARY BLOOD GLUCOSE          RADIOLOGY & ADDITIONAL TESTS:    Imaging Personally Reviewed:  [X ] YES  [ ] NO    Consultant(s) Notes Reviewed:  [X ] YES  [ ] NO    Care Discussed with Consultants/Other Providers [X ] YES  [ ] NO

## 2019-05-18 NOTE — PROGRESS NOTE ADULT - SUBJECTIVE AND OBJECTIVE BOX
INTERVAL History:  Leg Swelling.  Jaundice  Hematoma  Allergies    No Known Allergies    Intolerances        MEDICATIONS  (STANDING):  chlordiazePOXIDE 10 milliGRAM(s) Oral three times a day  chlorhexidine 2% Cloths 1 Application(s) Topical daily  chlorhexidine 4% Liquid 1 Application(s) Topical <User Schedule>  folic acid 1 milliGRAM(s) Oral daily  furosemide    Tablet 40 milliGRAM(s) Oral two times a day  lactulose Syrup 10 Gram(s) Oral two times a day  methylPREDNISolone sodium succinate Injectable 125 milliGRAM(s) IV Push daily  multivitamin 1 Tablet(s) Oral daily  pantoprazole    Tablet 40 milliGRAM(s) Oral before breakfast  piperacillin/tazobactam IVPB. 3.375 Gram(s) IV Intermittent every 8 hours  vancomycin  IVPB 1000 milliGRAM(s) IV Intermittent every 12 hours    MEDICATIONS  (PRN):  oxyCODONE    5 mG/acetaminophen 325 mG 1 Tablet(s) Oral every 4 hours PRN Moderate Pain (4 - 6)  oxyCODONE    5 mG/acetaminophen 325 mG 2 Tablet(s) Oral every 6 hours PRN Severe Pain (7 - 10)  polyethylene glycol 3350 17 Gram(s) Oral daily PRN Constipation      Vital Signs Last 24 Hrs  T(C): 36.1 (18 May 2019 05:00), Max: 36.1 (17 May 2019 23:25)  T(F): 97 (18 May 2019 05:00), Max: 97 (17 May 2019 23:25)  HR: 98 (18 May 2019 05:00) (85 - 98)  BP: 125/76 (18 May 2019 05:00) (102/64 - 125/76)  BP(mean): --  RR: 18 (18 May 2019 05:00) (18 - 18)  SpO2: 99% (18 May 2019 05:00) (98% - 100%)    PHYSICAL EXAM:      EYES: EOMI, PERRLA, conjunctiva and sclera :- Icterus.  NECK: Supple, No JVD, Normal thyroid  CHEST/LUNG: Clear to percussion bilaterally; No rales, rhonchi,   HEART: Regular rate and rhythm;   ABDOMEN: Distended  Edema:- ++        LABS:                        8.0    9.63  )-----------( 197      ( 18 May 2019 05:46 )             24.3     05-18    142  |  111<H>  |  36<H>  ----------------------------<  112<H>  4.3   |  23  |  1.24    Ca    8.5      18 May 2019 05:46  Phos  3.7     05-17  Mg     1.8     05-17    TPro  7.6  /  Alb  2.0<L>  /  TBili  9.2<H>  /  DBili  4.89<H>  /  AST  84<H>  /  ALT  17  /  AlkPhos  89  05-17            RADIOLOGY & ADDITIONAL STUDIES:    PATHOLOGY:

## 2019-05-18 NOTE — PROGRESS NOTE ADULT - PROBLEM SELECTOR PLAN 4
wbc count ok no fever however extensive cellulitis on imaging on   Vancomycin and Zosyn binta check trough next dose

## 2019-05-19 LAB
ANION GAP SERPL CALC-SCNC: 11 MMOL/L — SIGNIFICANT CHANGE UP (ref 5–17)
BUN SERPL-MCNC: 37 MG/DL — HIGH (ref 7–23)
CALCIUM SERPL-MCNC: 8.5 MG/DL — SIGNIFICANT CHANGE UP (ref 8.5–10.1)
CHLORIDE SERPL-SCNC: 110 MMOL/L — HIGH (ref 96–108)
CO2 SERPL-SCNC: 23 MMOL/L — SIGNIFICANT CHANGE UP (ref 22–31)
CREAT SERPL-MCNC: 1.38 MG/DL — HIGH (ref 0.5–1.3)
GLUCOSE SERPL-MCNC: 126 MG/DL — HIGH (ref 70–99)
HCT VFR BLD CALC: 24.7 % — LOW (ref 39–50)
HGB BLD-MCNC: 7.9 G/DL — LOW (ref 13–17)
INR BLD: 1.74 RATIO — HIGH (ref 0.88–1.16)
LDH SERPL L TO P-CCNC: 502 U/L — HIGH (ref 50–242)
MCHC RBC-ENTMCNC: 31.7 PG — SIGNIFICANT CHANGE UP (ref 27–34)
MCHC RBC-ENTMCNC: 32 GM/DL — SIGNIFICANT CHANGE UP (ref 32–36)
MCV RBC AUTO: 99.2 FL — SIGNIFICANT CHANGE UP (ref 80–100)
NRBC # BLD: 0 /100 WBCS — SIGNIFICANT CHANGE UP (ref 0–0)
PLATELET # BLD AUTO: 202 K/UL — SIGNIFICANT CHANGE UP (ref 150–400)
POTASSIUM SERPL-MCNC: 3.8 MMOL/L — SIGNIFICANT CHANGE UP (ref 3.5–5.3)
POTASSIUM SERPL-SCNC: 3.8 MMOL/L — SIGNIFICANT CHANGE UP (ref 3.5–5.3)
PROTHROM AB SERPL-ACNC: 19.8 SEC — HIGH (ref 10–12.9)
RBC # BLD: 2.49 M/UL — LOW (ref 4.2–5.8)
RBC # BLD: 2.49 M/UL — LOW (ref 4.2–5.8)
RBC # FLD: 25.8 % — HIGH (ref 10.3–14.5)
RETICS #: 95.9 K/UL — SIGNIFICANT CHANGE UP (ref 25–125)
RETICS/RBC NFR: 3.9 % — HIGH (ref 0.5–2.5)
SODIUM SERPL-SCNC: 144 MMOL/L — SIGNIFICANT CHANGE UP (ref 135–145)
WBC # BLD: 9.61 K/UL — SIGNIFICANT CHANGE UP (ref 3.8–10.5)
WBC # FLD AUTO: 9.61 K/UL — SIGNIFICANT CHANGE UP (ref 3.8–10.5)

## 2019-05-19 PROCEDURE — 99233 SBSQ HOSP IP/OBS HIGH 50: CPT

## 2019-05-19 RX ADMIN — CHLORHEXIDINE GLUCONATE 1 APPLICATION(S): 213 SOLUTION TOPICAL at 05:18

## 2019-05-19 RX ADMIN — Medication 10 MILLIGRAM(S): at 22:00

## 2019-05-19 RX ADMIN — Medication 20 MILLIGRAM(S): at 22:00

## 2019-05-19 RX ADMIN — Medication 250 MILLIGRAM(S): at 17:17

## 2019-05-19 RX ADMIN — Medication 10 MILLIGRAM(S): at 13:27

## 2019-05-19 RX ADMIN — CHLORHEXIDINE GLUCONATE 1 APPLICATION(S): 213 SOLUTION TOPICAL at 11:53

## 2019-05-19 RX ADMIN — Medication 40 MILLIGRAM(S): at 17:16

## 2019-05-19 RX ADMIN — Medication 125 MILLIGRAM(S): at 05:16

## 2019-05-19 RX ADMIN — Medication 250 MILLIGRAM(S): at 05:17

## 2019-05-19 RX ADMIN — LACTULOSE 10 GRAM(S): 10 SOLUTION ORAL at 05:16

## 2019-05-19 RX ADMIN — Medication 40 MILLIGRAM(S): at 05:19

## 2019-05-19 RX ADMIN — LACTULOSE 10 GRAM(S): 10 SOLUTION ORAL at 17:16

## 2019-05-19 RX ADMIN — Medication 1 TABLET(S): at 11:51

## 2019-05-19 RX ADMIN — PANTOPRAZOLE SODIUM 40 MILLIGRAM(S): 20 TABLET, DELAYED RELEASE ORAL at 08:19

## 2019-05-19 RX ADMIN — Medication 1 MILLIGRAM(S): at 11:51

## 2019-05-19 RX ADMIN — PIPERACILLIN AND TAZOBACTAM 25 GRAM(S): 4; .5 INJECTION, POWDER, LYOPHILIZED, FOR SOLUTION INTRAVENOUS at 21:58

## 2019-05-19 RX ADMIN — Medication 10 MILLIGRAM(S): at 05:16

## 2019-05-19 RX ADMIN — PIPERACILLIN AND TAZOBACTAM 25 GRAM(S): 4; .5 INJECTION, POWDER, LYOPHILIZED, FOR SOLUTION INTRAVENOUS at 13:27

## 2019-05-19 RX ADMIN — PIPERACILLIN AND TAZOBACTAM 25 GRAM(S): 4; .5 INJECTION, POWDER, LYOPHILIZED, FOR SOLUTION INTRAVENOUS at 05:17

## 2019-05-19 NOTE — PROGRESS NOTE ADULT - SUBJECTIVE AND OBJECTIVE BOX
INTERVAL History:  Hemolytic Anemia.  Icterus    Allergies    No Known Allergies    Intolerances        MEDICATIONS  (STANDING):  chlordiazePOXIDE 10 milliGRAM(s) Oral three times a day  chlorhexidine 2% Cloths 1 Application(s) Topical daily  chlorhexidine 4% Liquid 1 Application(s) Topical <User Schedule>  folic acid 1 milliGRAM(s) Oral daily  furosemide    Tablet 40 milliGRAM(s) Oral two times a day  lactulose Syrup 10 Gram(s) Oral two times a day  multivitamin 1 Tablet(s) Oral daily  pantoprazole    Tablet 40 milliGRAM(s) Oral before breakfast  piperacillin/tazobactam IVPB. 3.375 Gram(s) IV Intermittent every 8 hours  vancomycin  IVPB 1000 milliGRAM(s) IV Intermittent every 12 hours    MEDICATIONS  (PRN):  oxyCODONE    5 mG/acetaminophen 325 mG 1 Tablet(s) Oral every 4 hours PRN Moderate Pain (4 - 6)  oxyCODONE    5 mG/acetaminophen 325 mG 2 Tablet(s) Oral every 6 hours PRN Severe Pain (7 - 10)  polyethylene glycol 3350 17 Gram(s) Oral daily PRN Constipation      Vital Signs Last 24 Hrs  T(C): 36.2 (19 May 2019 05:00), Max: 36.4 (18 May 2019 23:26)  T(F): 97.2 (19 May 2019 05:00), Max: 97.6 (18 May 2019 23:26)  HR: 79 (19 May 2019 05:00) (79 - 81)  BP: 120/52 (19 May 2019 05:00) (120/52 - 124/65)  BP(mean): --  RR: 17 (19 May 2019 05:00) (17 - 19)  SpO2: 95% (19 May 2019 05:00) (95% - 99%)    PHYSICAL EXAM:      EYES: EOMI, PERRLA, conjunctiva and sclera yellow  NECK: Supple, No JVD, Normal thyroid  CHEST/LUNG: Clear to percussion bilaterally; No rales, rhonchi,   HEART: Regular rate and rhythm;   ABDOMEN: Ascites  edema ++        LABS:                        7.9    9.61  )-----------( 202      ( 19 May 2019 06:41 )             24.7     05-19    144  |  110<H>  |  37<H>  ----------------------------<  126<H>  3.8   |  23  |  1.38<H>    Ca    8.5      19 May 2019 06:41      PT/INR - ( 19 May 2019 06:41 )   PT: 19.8 sec;   INR: 1.74 ratio                 RADIOLOGY & ADDITIONAL STUDIES:    PATHOLOGY:

## 2019-05-19 NOTE — PROGRESS NOTE ADULT - ASSESSMENT
53 yo AAM  with h/o HTN and ETOH abuse brought in by wife for generalized weakness and dark urine; pt  found to have lactic acidosis (8.8), possible RICHAR with Hb 2.9 and repeat 2.4. In the ER pt was hemodynamically stable    Patient improving. Some recovery of H/H following a total of 9 UNITS of PRBC  this admission.  Hematologic derangement's are most likely multifactorial: Acute blood loss anemia into left leg Hematoma, Active hemalosis, Poor clotting factor synthesis secondary to cirrhosis of the liver   Appreciate Hematology, Vascular, Renal, GI, ID consults as this is a multi desplinary case       DC planning to rehab please call a podiatry consult

## 2019-05-20 DIAGNOSIS — R60.9 EDEMA, UNSPECIFIED: ICD-10-CM

## 2019-05-20 DIAGNOSIS — B35.1 TINEA UNGUIUM: ICD-10-CM

## 2019-05-20 LAB
ALBUMIN SERPL ELPH-MCNC: 1.8 G/DL — LOW (ref 3.3–5)
ALP SERPL-CCNC: 123 U/L — HIGH (ref 40–120)
ALT FLD-CCNC: 23 U/L — SIGNIFICANT CHANGE UP (ref 12–78)
ANION GAP SERPL CALC-SCNC: 8 MMOL/L — SIGNIFICANT CHANGE UP (ref 5–17)
AST SERPL-CCNC: 76 U/L — HIGH (ref 15–37)
BILIRUB DIRECT SERPL-MCNC: 3.6 MG/DL — HIGH (ref 0.05–0.2)
BILIRUB INDIRECT FLD-MCNC: 2.9 MG/DL — HIGH (ref 0.2–1)
BILIRUB SERPL-MCNC: 6.5 MG/DL — HIGH (ref 0.2–1.2)
BUN SERPL-MCNC: 35 MG/DL — HIGH (ref 7–23)
CALCIUM SERPL-MCNC: 8.7 MG/DL — SIGNIFICANT CHANGE UP (ref 8.5–10.1)
CHLORIDE SERPL-SCNC: 107 MMOL/L — SIGNIFICANT CHANGE UP (ref 96–108)
CO2 SERPL-SCNC: 27 MMOL/L — SIGNIFICANT CHANGE UP (ref 22–31)
CREAT SERPL-MCNC: 1.26 MG/DL — SIGNIFICANT CHANGE UP (ref 0.5–1.3)
GLUCOSE SERPL-MCNC: 130 MG/DL — HIGH (ref 70–99)
HCT VFR BLD CALC: 26.7 % — LOW (ref 39–50)
HGB BLD-MCNC: 8.6 G/DL — LOW (ref 13–17)
MCHC RBC-ENTMCNC: 32 PG — SIGNIFICANT CHANGE UP (ref 27–34)
MCHC RBC-ENTMCNC: 32.2 GM/DL — SIGNIFICANT CHANGE UP (ref 32–36)
MCV RBC AUTO: 99.3 FL — SIGNIFICANT CHANGE UP (ref 80–100)
NRBC # BLD: 0 /100 WBCS — SIGNIFICANT CHANGE UP (ref 0–0)
PLATELET # BLD AUTO: 198 K/UL — SIGNIFICANT CHANGE UP (ref 150–400)
POTASSIUM SERPL-MCNC: 3.8 MMOL/L — SIGNIFICANT CHANGE UP (ref 3.5–5.3)
POTASSIUM SERPL-SCNC: 3.8 MMOL/L — SIGNIFICANT CHANGE UP (ref 3.5–5.3)
PROT SERPL-MCNC: 7.6 GM/DL — SIGNIFICANT CHANGE UP (ref 6–8.3)
RBC # BLD: 2.69 M/UL — LOW (ref 4.2–5.8)
RBC # FLD: 24.9 % — HIGH (ref 10.3–14.5)
SODIUM SERPL-SCNC: 142 MMOL/L — SIGNIFICANT CHANGE UP (ref 135–145)
VANCOMYCIN TROUGH SERPL-MCNC: 17.5 UG/ML — SIGNIFICANT CHANGE UP (ref 10–20)
VANCOMYCIN TROUGH SERPL-MCNC: 23.6 UG/ML — HIGH (ref 10–20)
WBC # BLD: 9.18 K/UL — SIGNIFICANT CHANGE UP (ref 3.8–10.5)
WBC # FLD AUTO: 9.18 K/UL — SIGNIFICANT CHANGE UP (ref 3.8–10.5)

## 2019-05-20 PROCEDURE — 99223 1ST HOSP IP/OBS HIGH 75: CPT

## 2019-05-20 PROCEDURE — 99233 SBSQ HOSP IP/OBS HIGH 50: CPT

## 2019-05-20 RX ORDER — BUMETANIDE 0.25 MG/ML
1 INJECTION INTRAMUSCULAR; INTRAVENOUS EVERY 12 HOURS
Refills: 0 | Status: DISCONTINUED | OUTPATIENT
Start: 2019-05-20 | End: 2019-05-31

## 2019-05-20 RX ADMIN — Medication 40 MILLIGRAM(S): at 05:27

## 2019-05-20 RX ADMIN — Medication 10 MILLIGRAM(S): at 05:27

## 2019-05-20 RX ADMIN — Medication 20 MILLIGRAM(S): at 05:27

## 2019-05-20 RX ADMIN — Medication 1 MILLIGRAM(S): at 12:21

## 2019-05-20 RX ADMIN — PIPERACILLIN AND TAZOBACTAM 25 GRAM(S): 4; .5 INJECTION, POWDER, LYOPHILIZED, FOR SOLUTION INTRAVENOUS at 05:26

## 2019-05-20 RX ADMIN — PIPERACILLIN AND TAZOBACTAM 25 GRAM(S): 4; .5 INJECTION, POWDER, LYOPHILIZED, FOR SOLUTION INTRAVENOUS at 22:13

## 2019-05-20 RX ADMIN — Medication 10 MILLIGRAM(S): at 22:11

## 2019-05-20 RX ADMIN — BUMETANIDE 1 MILLIGRAM(S): 0.25 INJECTION INTRAMUSCULAR; INTRAVENOUS at 20:12

## 2019-05-20 RX ADMIN — CHLORHEXIDINE GLUCONATE 1 APPLICATION(S): 213 SOLUTION TOPICAL at 05:31

## 2019-05-20 RX ADMIN — Medication 20 MILLIGRAM(S): at 14:40

## 2019-05-20 RX ADMIN — Medication 20 MILLIGRAM(S): at 22:11

## 2019-05-20 RX ADMIN — Medication 10 MILLIGRAM(S): at 14:39

## 2019-05-20 RX ADMIN — Medication 1 TABLET(S): at 12:21

## 2019-05-20 RX ADMIN — PANTOPRAZOLE SODIUM 40 MILLIGRAM(S): 20 TABLET, DELAYED RELEASE ORAL at 08:22

## 2019-05-20 RX ADMIN — LACTULOSE 10 GRAM(S): 10 SOLUTION ORAL at 05:26

## 2019-05-20 RX ADMIN — Medication 250 MILLIGRAM(S): at 18:22

## 2019-05-20 RX ADMIN — LACTULOSE 10 GRAM(S): 10 SOLUTION ORAL at 18:20

## 2019-05-20 RX ADMIN — PIPERACILLIN AND TAZOBACTAM 25 GRAM(S): 4; .5 INJECTION, POWDER, LYOPHILIZED, FOR SOLUTION INTRAVENOUS at 14:38

## 2019-05-20 NOTE — PROGRESS NOTE ADULT - ASSESSMENT
55 yo AAM  with h/o HTN and ETOH abuse brought in by wife for generalized weakness and dark urine; pt  found to have lactic acidosis (8.8), possible RICHAR with Hb 2.9 and repeat 2.4. In the ER pt was hemodynamically stable    Patient improving. Some recovery of H/H following a total of 9 UNITS of PRBC  this admission.  Hematologic derangement's are most likely multifactorial: Acute blood loss anemia into left leg Hematoma, Active hemalosis, Poor clotting factor synthesis secondary to cirrhosis of the liver   Appreciate Hematology, Vascular, Renal, GI, ID consults as this is a multi desplinary case       DC planning to rehab

## 2019-05-20 NOTE — DISCHARGE NOTE PROVIDER - CARE PROVIDER_API CALL
Luis Enrique Kilpatrick)  Internal Medicine  [address] [facility] [address], [address]  [place] [address]  Phone: [phone]  Fax: [phone]  Follow Up Time: 1 week    Marla Ji)  Medical Oncology  [address] [facility], [address]  [place] [address]  Phone: [phone]  Fax: [phone]  Follow Up Time: 1-3 days    Jamie Hook)  Medicine  [address] [place] [address]  Phone: [phone]  Fax: [phone]  Follow Up Time: 1 week Luis Enrique Kilpatrick)  Internal Medicine  [address] [address], [address]  [place] [address]  Phone: [phone]  Fax: [phone]  Follow Up Time: 1 week    Marla Ji)  Medical Oncology  [address] [facility], [address]  [place] [address]  Phone: [phone]  Fax: [phone]  Follow Up Time: 1-3 days    Jamie Hook)  Medicine  [address] [place] [address]  Phone: [phone]  Fax: [phone]  Follow Up Time: 1 week    Anand Drummond)  Orthopaedic Surgery  [address], [address]  [place] [address]  Phone: [phone]  Fax: [phone]  Follow Up Time:

## 2019-05-20 NOTE — PROGRESS NOTE ADULT - SUBJECTIVE AND OBJECTIVE BOX
Patient is a 54y old  Male who presents with a chief complaint of anemia/ (20 May 2019 10:42)      INTERVAL HPI/OVERNIGHT EVENTS: no events     MEDICATIONS  (STANDING):  chlordiazePOXIDE 10 milliGRAM(s) Oral three times a day  chlorhexidine 2% Cloths 1 Application(s) Topical daily  chlorhexidine 4% Liquid 1 Application(s) Topical <User Schedule>  folic acid 1 milliGRAM(s) Oral daily  furosemide    Tablet 40 milliGRAM(s) Oral two times a day  lactulose Syrup 10 Gram(s) Oral two times a day  multivitamin 1 Tablet(s) Oral daily  pantoprazole    Tablet 40 milliGRAM(s) Oral before breakfast  piperacillin/tazobactam IVPB. 3.375 Gram(s) IV Intermittent every 8 hours  predniSONE   Tablet 20 milliGRAM(s) Oral three times a day  vancomycin  IVPB 1000 milliGRAM(s) IV Intermittent every 12 hours    MEDICATIONS  (PRN):  oxyCODONE    5 mG/acetaminophen 325 mG 1 Tablet(s) Oral every 4 hours PRN Moderate Pain (4 - 6)  oxyCODONE    5 mG/acetaminophen 325 mG 2 Tablet(s) Oral every 6 hours PRN Severe Pain (7 - 10)  polyethylene glycol 3350 17 Gram(s) Oral daily PRN Constipation      Allergies    No Known Allergies    Intolerances           Vital Signs Last 24 Hrs  T(C): 36.6 (20 May 2019 04:57), Max: 37.1 (19 May 2019 17:50)  T(F): 97.9 (20 May 2019 04:57), Max: 98.8 (19 May 2019 17:50)  HR: 86 (20 May 2019 04:57) (81 - 88)  BP: 123/68 (20 May 2019 04:57) (114/66 - 123/68)  BP(mean): --  RR: 17 (20 May 2019 04:57) (17 - 18)  SpO2: 98% (20 May 2019 04:57) (96% - 99%)    PHYSICAL EXAM:  GENERAL: NAD, well-groomed, well-developed  HEAD:  Atraumatic, Normocephalic  EYES: EOMI, PERRLA, conjunctiva and sclera clear  ENMT: No tonsillar erythema, exudates, or enlargement; Moist mucous membranes, Good dentition, No lesions  NECK: Supple, No JVD, Normal thyroid  NERVOUS SYSTEM:  Alert & Oriented X3, Good concentration; Motor Strength 5/5 B/L upper and lower extremities; DTRs 2+ intact and symmetric  CHEST/LUNG: Clear to percussion bilaterally; No rales, rhonchi, wheezing, or rubs  HEART: Regular rate and rhythm; No murmurs, rubs, or gallops  ABDOMEN: Soft, Nontender, Nondistended; Bowel sounds present decreased ascitis   EXTREMITIES:  2+ Peripheral Pulses,  b/l edema   SKIN:  jaundice poor toenail care       LABS:                        8.6    9.18  )-----------( 198      ( 20 May 2019 05:46 )             26.7     05-20    142  |  107  |  35<H>  ----------------------------<  130<H>  3.8   |  27  |  1.26    Ca    8.7      20 May 2019 05:46    TPro  7.6  /  Alb  1.8<L>  /  TBili  6.5<H>  /  DBili  3.60<H>  /  AST  76<H>  /  ALT  23  /  AlkPhos  123<H>  05-20    PT/INR - ( 19 May 2019 06:41 )   PT: 19.8 sec;   INR: 1.74 ratio             CAPILLARY BLOOD GLUCOSE          RADIOLOGY & ADDITIONAL TESTS:    Imaging Personally Reviewed:  [ X] YES  [ ] NO    Consultant(s) Notes Reviewed:  [ X] YES  [ ] NO    Care Discussed with Consultants/Other Providers [X ] YES  [ ] NO

## 2019-05-20 NOTE — DISCHARGE NOTE PROVIDER - PROVIDER TOKENS
PROVIDER:[TOKEN:[[id number]],FOLLOWUP:[1 week]],PROVIDER:[TOKEN:[[id number]],FOLLOWUP:[1-3 days]],PROVIDER:[TOKEN:[[id number]],FOLLOWUP:[1 week]] PROVIDER:[TOKEN:[[id number]],FOLLOWUP:[1 week]],PROVIDER:[TOKEN:[[id number]],FOLLOWUP:[1-3 days]],PROVIDER:[TOKEN:[[id number]],FOLLOWUP:[1 week]],PROVIDER:[TOKEN:[[id number]]]

## 2019-05-20 NOTE — DISCHARGE NOTE PROVIDER - HOSPITAL COURSE
55 yo AAM  with h/o HTN and ETOH abuse brought in by wife for generalized weakness and dark urine; pt  found to have lactic acidosis (8.8), possible RICHAR with Hb 2.9 and repeat 2.4. In the ER pt was hemodynamically stable        Patient improving. Some recovery of H/H following a total of 9 UNITS of PRBC  this admission.    Hematologic derangement's are most likely multifactorial: Acute blood loss anemia into left leg Hematoma, Active hemalosis, Poor clotting factor synthesis secondary to cirrhosis of the liver     Appreciate Hematology, Vascular, Renal, GI, ID consults as this is a multi desplinary case                               Problem/Plan - 1:    ·  Problem: Acute blood loss anemia.  Plan: into left leg hematoma s/p 9 units prbc doinjg better hematorit is stable.         Problem/Plan - 2:    ·  Problem: Acquired hemolytic anemia.  Plan: on prednisone, to f/u heme as outpt         Problem/Plan - 3:    ·  Problem: Hematoma.  Plan: non expanding on CTA no further intervention per Ortho and Vascular.  REPEAT MRI in 4weeks         Problem/Plan - 4:    ·  Problem: Cellulitis of left lower extremity.  Plan: wbc count ok no fever however extensive cellulitis on imaging switched to po abx 7days         Problem/Plan - 5:    ·  Problem: Ascites due to alcoholic cirrhosis.  Plan: paracentesis done no sbp.         Problem/Plan - 6:    Problem: Metabolic encephalopathy. Plan: resolved secondary to EtOH.        Problem/Plan - 7:    ·  Problem: RICHAR (acute kidney injury).  Plan: resolving appreciate nephrology consult.         Problem/Plan - 8:    ·  Problem: ETOH abuse.  Plan: tapered librium         Problem/Plan - 9:    ·  Problem: Benign essential HTN.  Plan: stable on bumex . 55 yo AAM  with h/o HTN and ETOH abuse brought in by wife for generalized weakness and dark urine; pt  found to have lactic acidosis (8.8), possible RICHAR with Hb 2.9 and repeat 2.4. In the ER pt was hemodynamically stable        Patient improving. Some recovery of H/H following a total of 9 UNITS of PRBC  this admission.    Hematologic derangement's are most likely multifactorial: Acute blood loss anemia into left leg Hematoma, Active hemalosis, Poor clotting factor synthesis secondary to cirrhosis of the liver     Appreciate Hematology, Vascular, Renal, GI, ID consults as this is a multi desplinary case                               Problem/Plan - 1:    ·  Problem: Acute blood loss anemia.  Plan: into left leg hematoma s/p 9 units prbc doinjg better hematorit is stable.         Problem/Plan - 2:    ·  Problem: Acquired hemolytic anemia.  Plan: on prednisone, to f/u heme as outpt         Problem/Plan - 3:    ·  Problem: Hematoma.  Plan: non expanding on CTA no further intervention per Ortho and Vascular.  REPEAT MRI in 4weeks         Problem/Plan - 4:    ·  Problem: Cellulitis of left lower extremity.  Plan: wbc count ok no fever completed abx        Problem/Plan - 5:    ·  Problem: Ascites due to alcoholic cirrhosis.  Plan: paracentesis done no sbp.         Problem/Plan - 6:    Problem: Metabolic encephalopathy. Plan: resolved secondary to EtOH.        Problem/Plan - 7:    ·  Problem: RICHAR (acute kidney injury).  Plan: resolving appreciate nephrology consult.         Problem/Plan - 8:    ·  Problem: ETOH abuse.  Plan: tapered librium         Problem/Plan - 9:    ·  Problem: Benign essential HTN.  Plan: stable on bumex . 55 yo AAM  with h/o HTN and ETOH abuse brought in by wife for generalized weakness and dark urine; pt  found to have lactic acidosis (8.8), possible RICHAR with Hb 2.9 and repeat 2.4. In the ER pt was hemodynamically stable        Patient improving. Some recovery of H/H following a total of 9 UNITS of PRBC  this admission.    Hematologic derangement's are most likely multifactorial: Acute blood loss anemia into left leg Hematoma, Active hemalosis, Poor clotting factor synthesis secondary to cirrhosis of the liver     Appreciate Hematology, Vascular, Renal, GI, ID consults as this is a multi desplinary case        Patient unhappy that he is waiting for rehab placement and decided to leave against medical advice. Alert and oriented x3, not intoxicated, and has capability to make medical decisions.  We discussed all risks, benefits, and alternatives to the progression of treatment and the potential dangers of leaving including but not limited to permanent disability, injury, and death.  The patient was instructed that they are welcome to change their decision to leave against medical advice and return to the emergency department at any time and for any reason in order to allow us to render care.

## 2019-05-20 NOTE — DISCHARGE NOTE PROVIDER - NSDCCPCAREPLAN_GEN_ALL_CORE_FT
PRINCIPAL DISCHARGE DIAGNOSIS  Diagnosis: Anemia, unspecified type  Assessment and Plan of Treatment: Follow up with oncologist      SECONDARY DISCHARGE DIAGNOSES  Diagnosis: Fall  Assessment and Plan of Treatment: Rehab    Diagnosis: Onychomycosis due to dermatophyte  Assessment and Plan of Treatment: Follow up with podiatrist    Diagnosis: Benign essential HTN  Assessment and Plan of Treatment: Continue medications as prescribed  Follow up with PMD  Low-sodium diet  AHA Recipes - [email or web address]      Diagnosis: Cellulitis of left lower extremity  Assessment and Plan of Treatment: Complete ABX    Diagnosis: ETOH abuse  Assessment and Plan of Treatment: Cessation and avoidance  Drug rehab    Diagnosis: Hematoma  Assessment and Plan of Treatment: Stable  No surgical intervention needed    Diagnosis: Ascites due to alcoholic cirrhosis  Assessment and Plan of Treatment: Stable  Follow up with GI as outpt    Diagnosis: Acute renal failure, unspecified acute renal failure type  Assessment and Plan of Treatment: Resolved

## 2019-05-20 NOTE — CONSULT NOTE ADULT - ATTENDING COMMENTS
LLE cellulitis and extensive swelling   redness all over the leg in patches   cont vanco and zosyn for now  monitor over next few days to make a d/c antibiotic plan    so far blood c/s +

## 2019-05-20 NOTE — PROGRESS NOTE ADULT - SUBJECTIVE AND OBJECTIVE BOX
Patient feels well no complaints today.    MEDICATIONS  (STANDING):  buMETAnide 1 milliGRAM(s) Oral every 12 hours  chlordiazePOXIDE 10 milliGRAM(s) Oral three times a day  chlorhexidine 2% Cloths 1 Application(s) Topical daily  chlorhexidine 4% Liquid 1 Application(s) Topical <User Schedule>  folic acid 1 milliGRAM(s) Oral daily  lactulose Syrup 10 Gram(s) Oral two times a day  multivitamin 1 Tablet(s) Oral daily  pantoprazole    Tablet 40 milliGRAM(s) Oral before breakfast  piperacillin/tazobactam IVPB. 3.375 Gram(s) IV Intermittent every 8 hours  predniSONE   Tablet 20 milliGRAM(s) Oral three times a day  vancomycin  IVPB 1000 milliGRAM(s) IV Intermittent every 12 hours    MEDICATIONS  (PRN):  oxyCODONE    5 mG/acetaminophen 325 mG 1 Tablet(s) Oral every 4 hours PRN Moderate Pain (4 - 6)  oxyCODONE    5 mG/acetaminophen 325 mG 2 Tablet(s) Oral every 6 hours PRN Severe Pain (7 - 10)  polyethylene glycol 3350 17 Gram(s) Oral daily PRN Constipation      05-19-19 @ 07:01  -  05-20-19 @ 07:00  --------------------------------------------------------  IN: 200 mL / OUT: 0 mL / NET: 200 mL      PHYSICAL EXAM:      T(C): 36.7 (05-20-19 @ 12:34), Max: 37.1 (05-19-19 @ 17:50)  HR: 73 (05-20-19 @ 12:34) (73 - 88)  BP: 101/71 (05-20-19 @ 12:34) (101/71 - 123/68)  RR: 17 (05-20-19 @ 12:34) (16 - 18)  SpO2: 99% (05-20-19 @ 12:34) (96% - 99%)  Wt(kg): --  Respiratory: clear anteriorly, decreased BS at bases  Cardiovascular: S1 S2  Gastrointestinal: soft NT ND +BS  Extremities:   2-3 edema                                    8.6    9.18  )-----------( 198      ( 20 May 2019 05:46 )             26.7     05-20    142  |  107  |  35<H>  ----------------------------<  130<H>  3.8   |  27  |  1.26    Ca    8.7      20 May 2019 05:46    TPro  7.6  /  Alb  1.8<L>  /  TBili  6.5<H>  /  DBili  3.60<H>  /  AST  76<H>  /  ALT  23  /  AlkPhos  123<H>  05-20      LIVER FUNCTIONS - ( 20 May 2019 05:46 )  Alb: 1.8 g/dL / Pro: 7.6 gm/dL / ALK PHOS: 123 U/L / ALT: 23 U/L / AST: 76 U/L / GGT: x           Creatinine Trend: 1.26<--, 1.38<--, 1.24<--, 1.28<--, 1.12<--, 1.25<--  Assessment and Plan:    RICHAR risk  for pigment ATN;  now improving;   Edema persists, change po lasix to bumex with superior bioavailability rx; trend creatinine;

## 2019-05-20 NOTE — PROGRESS NOTE ADULT - PROBLEM SELECTOR PLAN 2
8.4  ( 4.68 ) / 81 / 17 /  127 .==> 10.1 / 11.2 / 21 / 108  ==> 9.2 / 84 / 17 / 89  ==> 6.5 / 76 / 23 / 123 continues to improve , Less lethargic ( Ammonia 10 )    elevated LFTS probably secondary to hemolysis, meds ( eg Librium which is now on hold  ) and underlying cirrhosis . Viral studies negative. CT scan / Sono [id number] essentially negative.  On lactulose.

## 2019-05-20 NOTE — DISCHARGE NOTE PROVIDER - CARE PROVIDERS DIRECT ADDRESSES
,[email or web address],DirectAddress_Unknown,DirectAddress_Unknown ,[email or web address],DirectAddress_Unknown,DirectAddress_Unknown,[email or web address]

## 2019-05-20 NOTE — CONSULT NOTE ADULT - PROBLEM SELECTOR RECOMMENDATION 9
was on Vanco and zosyn since admission till 10th   antibiotics (vanco and zosyn ) started on 5/16 for cellulitis   Vanco level high topday sh be gheld off   cont current coverage   MRI reviewed ,no deep seated infection   Swelling is much more extensive than how bad the cellulitis appears  but no collection, has had two MRIs on 5/13 na d5/15

## 2019-05-20 NOTE — PROGRESS NOTE ADULT - SUBJECTIVE AND OBJECTIVE BOX
Patient is a 54y old  Male who presents with a chief complaint of anemia/ (20 May 2019 13:49)      HPI:  54 year old M HTN and ETOH abuse brought in by wife for generalized weakness and dark urine.  Wife reports patient has been having non-bloody non-bilious vomiting last week which stopped his drinking on Friday 5/3.  Wife also notes he has been having easy bruising and L leg swelling over the same amount of time.  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  Wife subsequently added that he has been having weakness and fatigue since early March with episodes of gum bleeding going back to early January.  Of note, wife noted that his skin color has changed over the past week.  Pt denies fevers, chills, eye pain vision changes, (08 May 2019 23:01)      INTERVAL HPI/OVERNIGHT EVENTS:  The patient denies melena, hematochezia, hematemesis, nausea, vomiting, abdominal pain, constipation, diarrhea, or change in bowel movements      MEDICATIONS  (STANDING):  buMETAnide 1 milliGRAM(s) Oral every 12 hours  chlordiazePOXIDE 10 milliGRAM(s) Oral three times a day  chlorhexidine 2% Cloths 1 Application(s) Topical daily  chlorhexidine 4% Liquid 1 Application(s) Topical <User Schedule>  folic acid 1 milliGRAM(s) Oral daily  lactulose Syrup 10 Gram(s) Oral two times a day  multivitamin 1 Tablet(s) Oral daily  pantoprazole    Tablet 40 milliGRAM(s) Oral before breakfast  piperacillin/tazobactam IVPB. 3.375 Gram(s) IV Intermittent every 8 hours  predniSONE   Tablet 20 milliGRAM(s) Oral three times a day  vancomycin  IVPB 1000 milliGRAM(s) IV Intermittent every 12 hours    MEDICATIONS  (PRN):  oxyCODONE    5 mG/acetaminophen 325 mG 1 Tablet(s) Oral every 4 hours PRN Moderate Pain (4 - 6)  oxyCODONE    5 mG/acetaminophen 325 mG 2 Tablet(s) Oral every 6 hours PRN Severe Pain (7 - 10)  polyethylene glycol 3350 17 Gram(s) Oral daily PRN Constipation      FAMILY HISTORY:      Allergies    No Known Allergies    Intolerances        PMH/PSH:  Benign essential HTN        REVIEW OF SYSTEMS:  CONSTITUTIONAL: No fever, weight loss, or fatigue  EYES: No eye pain, visual disturbances, or discharge  ENMT:  No difficulty hearing, tinnitus, vertigo; No sinus or throat pain  NECK: No pain or stiffness  BREASTS: No pain, masses, or nipple discharge  RESPIRATORY: No cough, wheezing, chills or hemoptysis; No shortness of breath  CARDIOVASCULAR: No chest pain, palpitations, dizziness, or leg swelling  GASTROINTESTINAL: see above  GENITOURINARY: No dysuria, frequency, hematuria, or incontinence  NEUROLOGICAL: No headaches, numbness, or tremors  SKIN: No itching, burning, rashes, or lesions   LYMPH NODES: No enlarged glands  ENDOCRINE: No heat or cold intolerance; No hair loss  MUSCULOSKELETAL: No joint pain or swelling; No muscle, back, or extremity pain  PSYCHIATRIC: No depression, anxiety, mood swings, or difficulty sleeping  HEME/LYMPH: No easy bruising, or bleeding gums  ALLERGY AND IMMUNOLOGIC: No hives or eczema    Vital Signs Last 24 Hrs  T(C): 36.7 (20 May 2019 12:34), Max: 37.1 (19 May 2019 17:50)  T(F): 98 (20 May 2019 12:34), Max: 98.8 (19 May 2019 17:50)  HR: 73 (20 May 2019 12:34) (73 - 88)  BP: 101/71 (20 May 2019 12:34) (101/71 - 123/68)  BP(mean): --  RR: 17 (20 May 2019 12:34) (16 - 18)  SpO2: 99% (20 May 2019 12:34) (96% - 99%)    PHYSICAL EXAM:  GENERAL: NAD, well-groomed, well-developed  HEAD:  Atraumatic, Normocephalic  EYES: EOMI, PERRLA, conjunctiva and sclera clear  NECK: Supple, No JVD, Normal thyroid  NERVOUS SYSTEM:  Alert & Oriented X3, Fair concentration; Motor Strength 5/5 B/L upper and lower extremities;   CHEST/LUNG: Clear to percussion bilaterally; No rales, rhonchi, wheezing, or rubs  HEART: Regular rate and rhythm; No murmurs, rubs, or gallops  ABDOMEN: Soft, Nontender, Nondistended; Bowel sounds present  EXTREMITIES:  2+ Peripheral Pulses, No clubbing, cyanosis, or edema  LYMPH: No lymphadenopathy noted  SKIN: No rashes or lesions    LAB                          8.6    9.18  )-----------( 198      ( 20 May 2019 05:46 )             26.7       CBC:  05-20 @ 05:46  WBC 9.18   Hgb 8.6   Hct 26.7   Plts 198  MCV 99.3  05-19 @ 06:41  WBC 9.61   Hgb 7.9   Hct 24.7   Plts 202  MCV 99.2  05-18 @ 05:46  WBC 9.63   Hgb 8.0   Hct 24.3   Plts 197  MCV 97.2  05-17 @ 06:52  WBC 6.91   Hgb 7.5   Hct 22.9   Plts 171  MCV 98.3  05-16 @ 07:02  WBC 10.16   Hgb 7.5   Hct 22.9   Plts 166  MCV 96.6  05-15 @ 07:36  WBC 8.31   Hgb 8.1   Hct 25.0   Plts 154  MCV 96.2  05-14 @ 07:01  WBC 6.63   Hgb 7.2   Hct 21.9   Plts 122  MCV 94.0      Chemistry:  05-20 @ 05:46  Na+ 142  K+ 3.8  Cl- 107  CO2 27  BUN 35  Cr 1.26     05-19 @ 06:41  Na+ 144  K+ 3.8  Cl- 110  CO2 23  BUN 37  Cr 1.38     05-18 @ 05:46  Na+ 142  K+ 4.3  Cl- 111  CO2 23  BUN 36  Cr 1.24     05-17 @ 06:52  Na+ 143  K+ 4.2  Cl- 111  CO2 22  BUN 32  Cr 1.28     05-16 @ 07:02  Na+ 142  K+ 3.3  Cl- 109  CO2 22  BUN 30  Cr 1.12     05-15 @ 07:36  Na+ 142  K+ 3.7  Cl- 110  CO2 23  BUN 33  Cr 1.25     05-14 @ 07:01  Na+ 141  K+ 3.9  Cl- 108  CO2 21  BUN 36  Cr 1.48         Glucose, Serum: 130 mg/dL (05-20 @ 05:46)  Glucose, Serum: 126 mg/dL (05-19 @ 06:41)  Glucose, Serum: 112 mg/dL (05-18 @ 05:46)  Glucose, Serum: 128 mg/dL (05-17 @ 06:52)  Glucose, Serum: 120 mg/dL (05-16 @ 07:02)  Glucose, Serum: 92 mg/dL (05-15 @ 07:36)  Glucose, Serum: 120 mg/dL (05-14 @ 07:01)      20 May 2019 05:46    142    |  107    |  35     ----------------------------<  130    3.8     |  27     |  1.26   19 May 2019 06:41    144    |  110    |  37     ----------------------------<  126    3.8     |  23     |  1.38   18 May 2019 05:46    142    |  111    |  36     ----------------------------<  112    4.3     |  23     |  1.24   17 May 2019 06:52    143    |  111    |  32     ----------------------------<  128    4.2     |  22     |  1.28   16 May 2019 07:02    142    |  109    |  30     ----------------------------<  120    3.3     |  22     |  1.12   15 May 2019 07:36    142    |  110    |  33     ----------------------------<  92     3.7     |  23     |  1.25   14 May 2019 07:01    141    |  108    |  36     ----------------------------<  120    3.9     |  21     |  1.48     Ca    8.7        20 May 2019 05:46  Ca    8.5        19 May 2019 06:41  Ca    8.5        18 May 2019 05:46  Ca    8.4        17 May 2019 06:52  Ca    8.7        16 May 2019 07:02  Ca    8.5        15 May 2019 07:36  Ca    8.2        14 May 2019 07:01  Phos  3.7       17 May 2019 06:52  Phos  3.1       16 May 2019 07:02  Phos  2.9       15 May 2019 07:36  Phos  2.9       14 May 2019 07:01  Mg     1.8       17 May 2019 06:52  Mg     1.7       16 May 2019 07:02  Mg     1.8       15 May 2019 07:36  Mg     1.7       14 May 2019 07:01    TPro  7.6    /  Alb  1.8    /  TBili  6.5    /  DBili  3.60   /  AST  76     /  ALT  23     /  AlkPhos  123    20 May 2019 05:46  TPro  7.6    /  Alb  2.0    /  TBili  9.2    /  DBili  4.89   /  AST  84     /  ALT  17     /  AlkPhos  89     17 May 2019 06:52  TPro  7.6    /  Alb  2.1    /  TBili  10.1   /  DBili  6.48   /  AST  112    /  ALT  21     /  AlkPhos  108    16 May 2019 07:02  TPro  x      /  Alb  x      /  TBili  8.4    /  DBili  x      /  AST  x      /  ALT  x      /  AlkPhos  x      15 May 2019 07:37  TPro  7.6    /  Alb  1.9    /  TBili  8.4    /  DBili  4.68   /  AST  81     /  ALT  17     /  AlkPhos  127    15 May 2019 07:36  TPro  7.0    /  Alb  1.8    /  TBili  8.2    /  DBili  x      /  AST  64     /  ALT  12     /  AlkPhos  117    14 May 2019 07:01      PT/INR - ( 19 May 2019 06:41 )   PT: 19.8 sec;   INR: 1.74 ratio                 CAPILLARY BLOOD GLUCOSE              RADIOLOGY & ADDITIONAL TESTS:    Imaging Personally Reviewed:  [ ] YES  [ ] NO    Consultant(s) Notes Reviewed:  [ ] YES  [ ] NO    Care Discussed with Consultants/Other Providers [ ] YES  [ ] NO

## 2019-05-20 NOTE — PROGRESS NOTE ADULT - PROBLEM SELECTOR PLAN 4
wbc count ok no fever however extensive cellulitis on imaging on   Vancomycin and Zosyn   ID was called

## 2019-05-20 NOTE — CONSULT NOTE ADULT - SUBJECTIVE AND OBJECTIVE BOX
Infectious Diseases - Attending at Dr. Olmos    HPI:  54 year old M HTN and ETOH abuse brought in by wife for generalized weakness and dark urine.  Wife reports patient has been having non-bloody non-bilious vomiting last week which stopped his drinking on Friday 5/3.  Wife also notes he has been having easy bruising and L leg swelling over the same amount of time.  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  Wife subsequently added that he has been having weakness and fatigue since early March with episodes of gum bleeding going back to early January.  Of note, wife noted that his skin color has changed over the past week.  Pt denies fevers, chills, eye pain vision changes, (08 May 2019 23:01)      PAST MEDICAL & SURGICAL HISTORY:  Benign essential HTN      Allergies    No Known Allergies    Intolerances        FAMILY HISTORY:      SOCIAL HISTORY:    REVIEW OF SYSTEMS:    Constitutional: No fever, weight loss or fatigue  Eyes: No eye pain, visual disturbances, or discharge  ENT:  No difficulty hearing, tinnitus, vertigo; No sinus or throat pain  Neck: No pain or stiffness  Respiratory: No cough, wheezing, chills or hemoptysis  Cardiovascular: No chest pain, palpitations, shortness of breath, dizziness or leg swelling  Gastrointestinal: No abdominal or epigastric pain. No nausea, vomiting or hematemesis; No diarrhea or constipation. No melena or hematochezia.  Genitourinary: No dysuria, frequency, hematuria or incontinence  Neurological: No headaches, memory loss, loss of strength, numbness or tremors  Skin: No itching, burning, rashes or lesions       MEDICATIONS  (STANDING):  buMETAnide 1 milliGRAM(s) Oral every 12 hours  chlordiazePOXIDE 10 milliGRAM(s) Oral three times a day  chlorhexidine 2% Cloths 1 Application(s) Topical daily  chlorhexidine 4% Liquid 1 Application(s) Topical <User Schedule>  folic acid 1 milliGRAM(s) Oral daily  lactulose Syrup 10 Gram(s) Oral two times a day  multivitamin 1 Tablet(s) Oral daily  pantoprazole    Tablet 40 milliGRAM(s) Oral before breakfast  piperacillin/tazobactam IVPB. 3.375 Gram(s) IV Intermittent every 8 hours  predniSONE   Tablet 20 milliGRAM(s) Oral three times a day  vancomycin  IVPB 1000 milliGRAM(s) IV Intermittent every 12 hours    MEDICATIONS  (PRN):  oxyCODONE    5 mG/acetaminophen 325 mG 1 Tablet(s) Oral every 4 hours PRN Moderate Pain (4 - 6)  oxyCODONE    5 mG/acetaminophen 325 mG 2 Tablet(s) Oral every 6 hours PRN Severe Pain (7 - 10)  polyethylene glycol 3350 17 Gram(s) Oral daily PRN Constipation      Vital Signs Last 24 Hrs  T(C): 36.7 (20 May 2019 12:34), Max: 37.1 (19 May 2019 17:50)  T(F): 98 (20 May 2019 12:34), Max: 98.8 (19 May 2019 17:50)  HR: 73 (20 May 2019 12:34) (73 - 88)  BP: 101/71 (20 May 2019 12:34) (101/71 - 123/68)  BP(mean): --  RR: 17 (20 May 2019 12:34) (16 - 18)  SpO2: 99% (20 May 2019 12:34) (96% - 99%)    PHYSICAL EXAM:    Constitutional: NAD, well-groomed, well-developed  HEENT: PERRLA, EOMI, Normal Hearing, MMM  Neck: No LAD, No JVD  Back: Normal spine flexure, No CVA tenderness  Respiratory: CTAB/L  Cardiovascular: S1 and S2, RRR, no M/G/R  Gastrointestinal: BS+, soft, NT/ND  Extremities: No peripheral edema  Vascular: 2+ peripheral pulses  Neurological: A/O x 3, no focal deficits  Skin: No rashes      LABS:                        8.6    9.18  )-----------( 198      ( 20 May 2019 05:46 )             26.7     05-20    142  |  107  |  35<H>  ----------------------------<  130<H>  3.8   |  27  |  1.26    Ca    8.7      20 May 2019 05:46    TPro  7.6  /  Alb  1.8<L>  /  TBili  6.5<H>  /  DBili  3.60<H>  /  AST  76<H>  /  ALT  23  /  AlkPhos  123<H>  05-20    PT/INR - ( 19 May 2019 06:41 )   PT: 19.8 sec;   INR: 1.74 ratio               MICROBIOLOGY:  RECENT CULTURES:  05-17 .Body Fluid XXXX   polymorphonuclear leukocytes seen per low power field  No organisms seen  by cytocentrifuge XXXX          RADIOLOGY & ADDITIONAL STUDIES: Infectious Diseases - Attending at Dr. Olmos    HPI:  54 year old M HTN and ETOH abuse brought in by wife for generalized weakness and dark urine.  Wife reports patient has been having non-bloody non-bilious vomiting last week which stopped his drinking on Friday 5/3.  Wife also notes he has been having easy bruising and L leg swelling over the same amount of time.  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  Wife subsequently added that he has been having weakness and fatigue since early March with episodes of gum bleeding going back to early January.  Of note, wife noted that his skin color has changed over the past week.  Pt denies fevers, chills, eye pain vision changes, (08 May 2019 23:01)      PAST MEDICAL & SURGICAL HISTORY:  Benign essential HTN      Allergies    No Known Allergies    Intolerances        FAMILY HISTORY:non contributory      SOCIAL HISTORY: non smoker    REVIEW OF SYSTEMS:    Constitutional: No fever, weight loss,+ fatigue  Eyes: No eye pain, visual disturbances, or discharge  ENT:  No difficulty hearing, tinnitus, vertigo; No sinus or throat pain  Neck: No pain or stiffness  Respiratory: No cough, wheezing, chills or hemoptysis  Cardiovascular: No chest pain, palpitations, shortness of breath, dizziness or leg swelling  Gastrointestinal: No abdominal or epigastric pain. No nausea, vomiting or hematemesis; No diarrhea or constipation. No melena or hematochezia.  Genitourinary: No dysuria, frequency, hematuria or incontinence  Neurological: No headaches, memory loss, loss of strength, numbness or tremors  Skin: legft leg swelling and redness+      MEDICATIONS  (STANDING):  buMETAnide 1 milliGRAM(s) Oral every 12 hours  chlordiazePOXIDE 10 milliGRAM(s) Oral three times a day  chlorhexidine 2% Cloths 1 Application(s) Topical daily  chlorhexidine 4% Liquid 1 Application(s) Topical <User Schedule>  folic acid 1 milliGRAM(s) Oral daily  lactulose Syrup 10 Gram(s) Oral two times a day  multivitamin 1 Tablet(s) Oral daily  pantoprazole    Tablet 40 milliGRAM(s) Oral before breakfast  piperacillin/tazobactam IVPB. 3.375 Gram(s) IV Intermittent every 8 hours  predniSONE   Tablet 20 milliGRAM(s) Oral three times a day  vancomycin  IVPB 1000 milliGRAM(s) IV Intermittent every 12 hours    MEDICATIONS  (PRN):  oxyCODONE    5 mG/acetaminophen 325 mG 1 Tablet(s) Oral every 4 hours PRN Moderate Pain (4 - 6)  oxyCODONE    5 mG/acetaminophen 325 mG 2 Tablet(s) Oral every 6 hours PRN Severe Pain (7 - 10)  polyethylene glycol 3350 17 Gram(s) Oral daily PRN Constipation      Vital Signs Last 24 Hrs  T(C): 36.7 (20 May 2019 12:34), Max: 37.1 (19 May 2019 17:50)  T(F): 98 (20 May 2019 12:34), Max: 98.8 (19 May 2019 17:50)  HR: 73 (20 May 2019 12:34) (73 - 88)  BP: 101/71 (20 May 2019 12:34) (101/71 - 123/68)  BP(mean): --  RR: 17 (20 May 2019 12:34) (16 - 18)  SpO2: 99% (20 May 2019 12:34) (96% - 99%)    PHYSICAL EXAM:    Constitutional: NAD, well-groomed, well-developed  HEENT: PERRLA, EOMI, Normal Hearing, MMM  Neck: No LAD, No JVD  Back: Normal spine flexure, No CVA tenderness  Respiratory: CTAB/L  Cardiovascular: S1 and S2, RRR, no M/G/R  Gastrointestinal: BS+, soft, NT/ND  Extremities: left leg swelling+  Vascular: 2+ peripheral pulses  Neurological: A/O x 3, no focal deficits  Skin: left leg swelling with patchy redness , tenderness+      LABS:                        8.6    9.18  )-----------( 198      ( 20 May 2019 05:46 )             26.7     05-20    142  |  107  |  35<H>  ----------------------------<  130<H>  3.8   |  27  |  1.26    Ca    8.7      20 May 2019 05:46    TPro  7.6  /  Alb  1.8<L>  /  TBili  6.5<H>  /  DBili  3.60<H>  /  AST  76<H>  /  ALT  23  /  AlkPhos  123<H>  05-20    PT/INR - ( 19 May 2019 06:41 )   PT: 19.8 sec;   INR: 1.74 ratio               MICROBIOLOGY:  RECENT CULTURES:  05-17 .Body Fluid XXXX   polymorphonuclear leukocytes seen per low power field  No organisms seen  by cytocentrifuge XXXX          RADIOLOGY & ADDITIONAL STUDIES:  MRI leg    Impression:    No internal enhancement within the previously noted intermediate to   hyperintense T1 and STIR collection along the posterior lateral aspect of   the distal femur. This is likely related to a hematoma. Superimposed   infection cannot be excluded on an imaging basis. Follow-up to resolution   of this collection is recommended to exclude any occult lesion.    Extensive cellulitis about the imaged left femur. Patchy enhancement   within the left abductor and hamstring musculature corresponding to   regions of increased or signal which may be related to nonspecific   myositis or muscle strain

## 2019-05-20 NOTE — PROGRESS NOTE ADULT - SUBJECTIVE AND OBJECTIVE BOX
INTERVAL History:  Icteric  Weak  Jaundice    Allergies    No Known Allergies    Intolerances        MEDICATIONS  (STANDING):  chlordiazePOXIDE 10 milliGRAM(s) Oral three times a day  chlorhexidine 2% Cloths 1 Application(s) Topical daily  chlorhexidine 4% Liquid 1 Application(s) Topical <User Schedule>  folic acid 1 milliGRAM(s) Oral daily  furosemide    Tablet 40 milliGRAM(s) Oral two times a day  lactulose Syrup 10 Gram(s) Oral two times a day  multivitamin 1 Tablet(s) Oral daily  pantoprazole    Tablet 40 milliGRAM(s) Oral before breakfast  piperacillin/tazobactam IVPB. 3.375 Gram(s) IV Intermittent every 8 hours  predniSONE   Tablet 20 milliGRAM(s) Oral three times a day  vancomycin  IVPB 1000 milliGRAM(s) IV Intermittent every 12 hours    MEDICATIONS  (PRN):  oxyCODONE    5 mG/acetaminophen 325 mG 1 Tablet(s) Oral every 4 hours PRN Moderate Pain (4 - 6)  oxyCODONE    5 mG/acetaminophen 325 mG 2 Tablet(s) Oral every 6 hours PRN Severe Pain (7 - 10)  polyethylene glycol 3350 17 Gram(s) Oral daily PRN Constipation      Vital Signs Last 24 Hrs  T(C): 36.6 (20 May 2019 04:57), Max: 37.2 (19 May 2019 11:17)  T(F): 97.9 (20 May 2019 04:57), Max: 98.9 (19 May 2019 11:17)  HR: 86 (20 May 2019 04:57) (81 - 88)  BP: 123/68 (20 May 2019 04:57) (114/66 - 123/68)  BP(mean): --  RR: 17 (20 May 2019 04:57) (17 - 19)  SpO2: 98% (20 May 2019 04:57) (96% - 100%)    PHYSICAL EXAM:    Icterus:- ++  EYES: EOMI, PERRLA, conjunctiva and sclera :- Yellow  NECK: Supple, No JVD, Normal thyroid  CHEST/LUNG: Clear to percussion bilaterally; No rales, rhonchi,   HEART: Regular rate and rhythm;   ABDOMEN: Soft,   Ascites:-++  Edema:- ++        LABS:                        8.6    9.18  )-----------( 198      ( 20 May 2019 05:46 )             26.7     05-20    142  |  107  |  35<H>  ----------------------------<  130<H>  3.8   |  27  |  1.26    Ca    8.7      20 May 2019 05:46    TPro  7.6  /  Alb  1.8<L>  /  TBili  6.5<H>  /  DBili  3.60<H>  /  AST  76<H>  /  ALT  23  /  AlkPhos  123<H>  05-20    PT/INR - ( 19 May 2019 06:41 )   PT: 19.8 sec;   INR: 1.74 ratio                 RADIOLOGY & ADDITIONAL STUDIES:    PATHOLOGY:

## 2019-05-21 LAB
ALBUMIN SERPL ELPH-MCNC: 1.9 G/DL — LOW (ref 3.3–5)
ALP SERPL-CCNC: 144 U/L — HIGH (ref 40–120)
ALT FLD-CCNC: 25 U/L — SIGNIFICANT CHANGE UP (ref 12–78)
ANION GAP SERPL CALC-SCNC: 9 MMOL/L — SIGNIFICANT CHANGE UP (ref 5–17)
AST SERPL-CCNC: 93 U/L — HIGH (ref 15–37)
BILIRUB SERPL-MCNC: 7 MG/DL — HIGH (ref 0.2–1.2)
BUN SERPL-MCNC: 32 MG/DL — HIGH (ref 7–23)
CALCIUM SERPL-MCNC: 8.6 MG/DL — SIGNIFICANT CHANGE UP (ref 8.5–10.1)
CHLORIDE SERPL-SCNC: 107 MMOL/L — SIGNIFICANT CHANGE UP (ref 96–108)
CO2 SERPL-SCNC: 26 MMOL/L — SIGNIFICANT CHANGE UP (ref 22–31)
CREAT SERPL-MCNC: 1.41 MG/DL — HIGH (ref 0.5–1.3)
GLUCOSE SERPL-MCNC: 118 MG/DL — HIGH (ref 70–99)
HCT VFR BLD CALC: 27.8 % — LOW (ref 39–50)
HGB BLD-MCNC: 8.8 G/DL — LOW (ref 13–17)
MCHC RBC-ENTMCNC: 31.4 PG — SIGNIFICANT CHANGE UP (ref 27–34)
MCHC RBC-ENTMCNC: 31.7 GM/DL — LOW (ref 32–36)
MCV RBC AUTO: 99.3 FL — SIGNIFICANT CHANGE UP (ref 80–100)
NRBC # BLD: 0 /100 WBCS — SIGNIFICANT CHANGE UP (ref 0–0)
PLATELET # BLD AUTO: 186 K/UL — SIGNIFICANT CHANGE UP (ref 150–400)
POTASSIUM SERPL-MCNC: 3.9 MMOL/L — SIGNIFICANT CHANGE UP (ref 3.5–5.3)
POTASSIUM SERPL-SCNC: 3.9 MMOL/L — SIGNIFICANT CHANGE UP (ref 3.5–5.3)
PROT SERPL-MCNC: 7.8 GM/DL — SIGNIFICANT CHANGE UP (ref 6–8.3)
RBC # BLD: 2.8 M/UL — LOW (ref 4.2–5.8)
RBC # FLD: 25.2 % — HIGH (ref 10.3–14.5)
SODIUM SERPL-SCNC: 142 MMOL/L — SIGNIFICANT CHANGE UP (ref 135–145)
WBC # BLD: 11.97 K/UL — HIGH (ref 3.8–10.5)
WBC # FLD AUTO: 11.97 K/UL — HIGH (ref 3.8–10.5)

## 2019-05-21 PROCEDURE — 99233 SBSQ HOSP IP/OBS HIGH 50: CPT

## 2019-05-21 RX ADMIN — Medication 1 TABLET(S): at 11:53

## 2019-05-21 RX ADMIN — Medication 10 MILLIGRAM(S): at 21:51

## 2019-05-21 RX ADMIN — Medication 20 MILLIGRAM(S): at 21:51

## 2019-05-21 RX ADMIN — Medication 10 MILLIGRAM(S): at 14:00

## 2019-05-21 RX ADMIN — Medication 250 MILLIGRAM(S): at 17:19

## 2019-05-21 RX ADMIN — PANTOPRAZOLE SODIUM 40 MILLIGRAM(S): 20 TABLET, DELAYED RELEASE ORAL at 08:03

## 2019-05-21 RX ADMIN — Medication 250 MILLIGRAM(S): at 05:53

## 2019-05-21 RX ADMIN — PIPERACILLIN AND TAZOBACTAM 25 GRAM(S): 4; .5 INJECTION, POWDER, LYOPHILIZED, FOR SOLUTION INTRAVENOUS at 14:00

## 2019-05-21 RX ADMIN — Medication 1 MILLIGRAM(S): at 11:54

## 2019-05-21 RX ADMIN — Medication 20 MILLIGRAM(S): at 14:00

## 2019-05-21 RX ADMIN — CHLORHEXIDINE GLUCONATE 1 APPLICATION(S): 213 SOLUTION TOPICAL at 06:05

## 2019-05-21 RX ADMIN — PIPERACILLIN AND TAZOBACTAM 25 GRAM(S): 4; .5 INJECTION, POWDER, LYOPHILIZED, FOR SOLUTION INTRAVENOUS at 21:51

## 2019-05-21 RX ADMIN — PIPERACILLIN AND TAZOBACTAM 25 GRAM(S): 4; .5 INJECTION, POWDER, LYOPHILIZED, FOR SOLUTION INTRAVENOUS at 05:58

## 2019-05-21 RX ADMIN — Medication 10 MILLIGRAM(S): at 06:04

## 2019-05-21 RX ADMIN — Medication 20 MILLIGRAM(S): at 05:59

## 2019-05-21 RX ADMIN — BUMETANIDE 1 MILLIGRAM(S): 0.25 INJECTION INTRAMUSCULAR; INTRAVENOUS at 05:59

## 2019-05-21 RX ADMIN — BUMETANIDE 1 MILLIGRAM(S): 0.25 INJECTION INTRAMUSCULAR; INTRAVENOUS at 18:00

## 2019-05-21 RX ADMIN — LACTULOSE 10 GRAM(S): 10 SOLUTION ORAL at 17:19

## 2019-05-21 RX ADMIN — LACTULOSE 10 GRAM(S): 10 SOLUTION ORAL at 05:59

## 2019-05-21 NOTE — PROGRESS NOTE ADULT - SUBJECTIVE AND OBJECTIVE BOX
Patient is a 54y old  Male who presents with a chief complaint of anemia/ (22 May 2019 11:10)      INTERVAL HPI / OVERNIGHT EVENTS: doing ok     MEDICATIONS  (STANDING):  buMETAnide 1 milliGRAM(s) Oral every 12 hours  chlordiazePOXIDE 10 milliGRAM(s) Oral three times a day  chlorhexidine 2% Cloths 1 Application(s) Topical daily  chlorhexidine 4% Liquid 1 Application(s) Topical <User Schedule>  folic acid 1 milliGRAM(s) Oral daily  lactulose Syrup 10 Gram(s) Oral two times a day  multivitamin 1 Tablet(s) Oral daily  pantoprazole    Tablet 40 milliGRAM(s) Oral before breakfast  piperacillin/tazobactam IVPB. 3.375 Gram(s) IV Intermittent every 8 hours  predniSONE   Tablet 20 milliGRAM(s) Oral three times a day  vancomycin  IVPB 1000 milliGRAM(s) IV Intermittent every 12 hours    MEDICATIONS  (PRN):  polyethylene glycol 3350 17 Gram(s) Oral daily PRN Constipation      Vital Signs Last 24 Hrs  Tmax : afebrile    Review of systems:  no fever, no chills, no diarrhea ,no vomiting ,leg swelling improving      PHYSICAL EXAM:  General :NAD  Constitutional:  well-groomed, well-developed  Respiratory: CTAB/L  Cardiovascular: S1 and S2, RRR, no M/G/R  Gastrointestinal: BS+, soft, NT/ND  Extremities: No peripheral edema  Vascular: 2+ peripheral pulses  Skin: left leg swelling ,redness      LABS:             wbc : wnl          MICROBIOLOGY:  RECENT CULTURES:  05-17 .Body Fluid XXXX   polymorphonuclear leukocytes seen per low power field  No organisms seen  by cytocentrifuge   No growth          RADIOLOGY & ADDITIONAL STUDIES:

## 2019-05-21 NOTE — PROGRESS NOTE ADULT - PROBLEM SELECTOR PLAN 2
8.4  ( 4.68 ) / 81 / 17 /  127 .==> 10.1 / 11.2 / 21 / 108  ==> 9.2 / 84 / 17 / 89  ==> 6.5 / 76 / 23 / 123  ==> 7.0 / 93 / 25 / 144 worse today , Less lethargic ( Ammonia 10 )    elevated LFTS probably secondary to hemolysis, meds ( eg Librium  ) and underlying cirrhosis . Viral studies negative. CT scan / Sono [id number] essentially negative.  On lactulose.

## 2019-05-21 NOTE — PROGRESS NOTE ADULT - SUBJECTIVE AND OBJECTIVE BOX
Patient is a 54y old  Male who presents with a chief complaint of anemia/ (21 May 2019 11:14)      HPI:  54 year old M HTN and ETOH abuse brought in by wife for generalized weakness and dark urine.  Wife reports patient has been having non-bloody non-bilious vomiting last week which stopped his drinking on Friday 5/3.  Wife also notes he has been having easy bruising and L leg swelling over the same amount of time.  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  Wife subsequently added that he has been having weakness and fatigue since early March with episodes of gum bleeding going back to early January.  Of note, wife noted that his skin color has changed over the past week.  Pt denies fevers, chills, eye pain vision changes, (08 May 2019 23:01)      INTERVAL HPI/OVERNIGHT EVENTS:  The patient denies melena, hematochezia, hematemesis, nausea, vomiting, abdominal pain, constipation, diarrhea, or change in bowel movements Tolerating diet    MEDICATIONS  (STANDING):  buMETAnide 1 milliGRAM(s) Oral every 12 hours  chlordiazePOXIDE 10 milliGRAM(s) Oral three times a day  chlorhexidine 2% Cloths 1 Application(s) Topical daily  chlorhexidine 4% Liquid 1 Application(s) Topical <User Schedule>  folic acid 1 milliGRAM(s) Oral daily  lactulose Syrup 10 Gram(s) Oral two times a day  multivitamin 1 Tablet(s) Oral daily  pantoprazole    Tablet 40 milliGRAM(s) Oral before breakfast  piperacillin/tazobactam IVPB. 3.375 Gram(s) IV Intermittent every 8 hours  predniSONE   Tablet 20 milliGRAM(s) Oral three times a day  vancomycin  IVPB 1000 milliGRAM(s) IV Intermittent every 12 hours    MEDICATIONS  (PRN):  polyethylene glycol 3350 17 Gram(s) Oral daily PRN Constipation      FAMILY HISTORY:      Allergies    No Known Allergies    Intolerances        PMH/PSH:  Benign essential HTN        REVIEW OF SYSTEMS:  CONSTITUTIONAL: No fever, weight loss, or fatigue  EYES: No eye pain, visual disturbances, or discharge  ENMT:  No difficulty hearing, tinnitus, vertigo; No sinus or throat pain  NECK: No pain or stiffness  BREASTS: No pain, masses, or nipple discharge  RESPIRATORY: No cough, wheezing, chills or hemoptysis; No shortness of breath  CARDIOVASCULAR: No chest pain, palpitations, dizziness, or leg swelling  GASTROINTESTINAL:  See above.  GENITOURINARY: No dysuria, frequency, hematuria, or incontinence  NEUROLOGICAL: No headaches, memory loss, loss of strength, numbness, or tremors  SKIN: No itching, burning, rashes, or lesions   LYMPH NODES: No enlarged glands  ENDOCRINE: No heat or cold intolerance; No hair loss  MUSCULOSKELETAL: No joint pain or swelling; No muscle, back, or extremity pain  PSYCHIATRIC: No depression, anxiety, mood swings, or difficulty sleeping  HEME/LYMPH: No easy bruising, or bleeding gums  ALLERGY AND IMMUNOLOGIC: No hives or eczema    Vital Signs Last 24 Hrs  T(C): 36 (21 May 2019 17:00), Max: 36.4 (21 May 2019 00:53)  T(F): 96.8 (21 May 2019 17:00), Max: 97.6 (21 May 2019 05:17)  HR: 83 (21 May 2019 17:00) (73 - 89)  BP: 117/66 (21 May 2019 17:00) (104/74 - 122/60)  BP(mean): --  RR: 18 (21 May 2019 17:00) (16 - 18)  SpO2: 100% (21 May 2019 17:00) (96% - 100%)    PHYSICAL EXAM:  GENERAL: NAD, well-groomed, well-developed  HEAD:  Atraumatic, Normocephalic  EYES: EOMI, PERRLA, conjunctiva and sclera clear  NECK: Supple, No JVD, Normal thyroid  NERVOUS SYSTEM:  Alert & Oriented X2, Good concentration; No asterixis  CHEST/LUNG: Clear to percussion bilaterally; No rales, rhonchi, wheezing, or rubs  HEART: Regular rate and rhythm; No murmurs, rubs, or gallops  ABDOMEN: Soft, Nontender, Nondistended; Bowel sounds present  EXTREMITIES:  2+ Peripheral Pulses, No clubbing, cyanosis, or edema  LYMPH: No lymphadenopathy noted  SKIN: No rashes or lesions    LAB                          8.8    11.97 )-----------( 186      ( 21 May 2019 07:32 )             27.8       CBC:  05-21 @ 07:32  WBC 11.97   Hgb 8.8   Hct 27.8   Plts 186  MCV 99.3  05-20 @ 05:46  WBC 9.18   Hgb 8.6   Hct 26.7   Plts 198  MCV 99.3  05-19 @ 06:41  WBC 9.61   Hgb 7.9   Hct 24.7   Plts 202  MCV 99.2  05-18 @ 05:46  WBC 9.63   Hgb 8.0   Hct 24.3   Plts 197  MCV 97.2  05-17 @ 06:52  WBC 6.91   Hgb 7.5   Hct 22.9   Plts 171  MCV 98.3  05-16 @ 07:02  WBC 10.16   Hgb 7.5   Hct 22.9   Plts 166  MCV 96.6  05-15 @ 07:36  WBC 8.31   Hgb 8.1   Hct 25.0   Plts 154  MCV 96.2      Chemistry:  05-21 @ 07:32  Na+ 142  K+ 3.9  Cl- 107  CO2 26  BUN 32  Cr 1.41     05-20 @ 05:46  Na+ 142  K+ 3.8  Cl- 107  CO2 27  BUN 35  Cr 1.26     05-19 @ 06:41  Na+ 144  K+ 3.8  Cl- 110  CO2 23  BUN 37  Cr 1.38     05-18 @ 05:46  Na+ 142  K+ 4.3  Cl- 111  CO2 23  BUN 36  Cr 1.24     05-17 @ 06:52  Na+ 143  K+ 4.2  Cl- 111  CO2 22  BUN 32  Cr 1.28     05-16 @ 07:02  Na+ 142  K+ 3.3  Cl- 109  CO2 22  BUN 30  Cr 1.12     05-15 @ 07:36  Na+ 142  K+ 3.7  Cl- 110  CO2 23  BUN 33  Cr 1.25         Glucose, Serum: 118 mg/dL (05-21 @ 07:32)  Glucose, Serum: 130 mg/dL (05-20 @ 05:46)  Glucose, Serum: 126 mg/dL (05-19 @ 06:41)  Glucose, Serum: 112 mg/dL (05-18 @ 05:46)  Glucose, Serum: 128 mg/dL (05-17 @ 06:52)  Glucose, Serum: 120 mg/dL (05-16 @ 07:02)  Glucose, Serum: 92 mg/dL (05-15 @ 07:36)      21 May 2019 07:32    142    |  107    |  32     ----------------------------<  118    3.9     |  26     |  1.41   20 May 2019 05:46    142    |  107    |  35     ----------------------------<  130    3.8     |  27     |  1.26   19 May 2019 06:41    144    |  110    |  37     ----------------------------<  126    3.8     |  23     |  1.38   18 May 2019 05:46    142    |  111    |  36     ----------------------------<  112    4.3     |  23     |  1.24   17 May 2019 06:52    143    |  111    |  32     ----------------------------<  128    4.2     |  22     |  1.28   16 May 2019 07:02    142    |  109    |  30     ----------------------------<  120    3.3     |  22     |  1.12   15 May 2019 07:36    142    |  110    |  33     ----------------------------<  92     3.7     |  23     |  1.25     Ca    8.6        21 May 2019 07:32  Ca    8.7        20 May 2019 05:46  Ca    8.5        19 May 2019 06:41  Ca    8.5        18 May 2019 05:46  Ca    8.4        17 May 2019 06:52  Ca    8.7        16 May 2019 07:02  Ca    8.5        15 May 2019 07:36  Phos  3.7       17 May 2019 06:52  Phos  3.1       16 May 2019 07:02  Phos  2.9       15 May 2019 07:36  Mg     1.8       17 May 2019 06:52  Mg     1.7       16 May 2019 07:02  Mg     1.8       15 May 2019 07:36    TPro  7.8    /  Alb  1.9    /  TBili  7.0    /  DBili  x      /  AST  93     /  ALT  25     /  AlkPhos  144    21 May 2019 07:32  TPro  7.6    /  Alb  1.8    /  TBili  6.5    /  DBili  3.60   /  AST  76     /  ALT  23     /  AlkPhos  123    20 May 2019 05:46  TPro  7.6    /  Alb  2.0    /  TBili  9.2    /  DBili  4.89   /  AST  84     /  ALT  17     /  AlkPhos  89     17 May 2019 06:52  TPro  7.6    /  Alb  2.1    /  TBili  10.1   /  DBili  6.48   /  AST  112    /  ALT  21     /  AlkPhos  108    16 May 2019 07:02  TPro  x      /  Alb  x      /  TBili  8.4    /  DBili  x      /  AST  x      /  ALT  x      /  AlkPhos  x      15 May 2019 07:37  TPro  7.6    /  Alb  1.9    /  TBili  8.4    /  DBili  4.68   /  AST  81     /  ALT  17     /  AlkPhos  127    15 May 2019 07:36              CAPILLARY BLOOD GLUCOSE              RADIOLOGY & ADDITIONAL TESTS:    Imaging Personally Reviewed:  [ ] YES  [ ] NO    Consultant(s) Notes Reviewed:  [ ] YES  [ ] NO    Care Discussed with Consultants/Other Providers [ ] YES  [ ] NO

## 2019-05-21 NOTE — PROGRESS NOTE ADULT - ATTENDING COMMENTS
LLE cellulitis and extensive swelling   redness all over the leg in patches   cont vanco and zosyn for now  monitor over next few days to make a d/c antibiotic plan    so far blood c/s + LLE cellulitis and extensive swelling   redness resolving,initially all over the leg in patches   cont vanco and zosyn for now   so far blood c/s +

## 2019-05-21 NOTE — PROGRESS NOTE ADULT - SUBJECTIVE AND OBJECTIVE BOX
INTERVAL History:  Weak  Leg Swelling  Ascites.    Allergies    No Known Allergies    Intolerances        MEDICATIONS  (STANDING):  buMETAnide 1 milliGRAM(s) Oral every 12 hours  chlordiazePOXIDE 10 milliGRAM(s) Oral three times a day  chlorhexidine 2% Cloths 1 Application(s) Topical daily  chlorhexidine 4% Liquid 1 Application(s) Topical <User Schedule>  folic acid 1 milliGRAM(s) Oral daily  lactulose Syrup 10 Gram(s) Oral two times a day  multivitamin 1 Tablet(s) Oral daily  pantoprazole    Tablet 40 milliGRAM(s) Oral before breakfast  piperacillin/tazobactam IVPB. 3.375 Gram(s) IV Intermittent every 8 hours  predniSONE   Tablet 20 milliGRAM(s) Oral three times a day  vancomycin  IVPB 1000 milliGRAM(s) IV Intermittent every 12 hours    MEDICATIONS  (PRN):  polyethylene glycol 3350 17 Gram(s) Oral daily PRN Constipation      Vital Signs Last 24 Hrs  T(C): 36.4 (21 May 2019 05:17), Max: 36.7 (20 May 2019 12:34)  T(F): 97.6 (21 May 2019 05:17), Max: 98 (20 May 2019 12:34)  HR: 89 (21 May 2019 10:33) (70 - 89)  BP: 117/41 (21 May 2019 10:33) (101/71 - 134/64)  BP(mean): --  RR: 18 (21 May 2019 10:33) (16 - 18)  SpO2: 100% (21 May 2019 10:33) (96% - 100%)    PHYSICAL EXAM:      EYES: EOMI, PERRLA, conjunctiva and sclera :- Icterus  NECK: Supple, No JVD, Normal thyroid  CHEST/LUNG: Clear to percussion bilaterally; No rales, rhonchi,   HEART: Regular rate and rhythm;   ABDOMEN: Ascites.  Edema:- +++        LABS:                        8.8    11.97 )-----------( 186      ( 21 May 2019 07:32 )             27.8     05-21    142  |  107  |  32<H>  ----------------------------<  118<H>  3.9   |  26  |  1.41<H>    Ca    8.6      21 May 2019 07:32    TPro  7.8  /  Alb  1.9<L>  /  TBili  7.0<H>  /  DBili  x   /  AST  93<H>  /  ALT  25  /  AlkPhos  144<H>  05-21            RADIOLOGY & ADDITIONAL STUDIES:    PATHOLOGY:

## 2019-05-21 NOTE — STUDENT SIGN OFF DOCUMENT - DOCUMENTS STUDENTS ARE SIGNED OFF ON
Assessment and Intervention/Vital Signs/Plan of Care
Vital Signs/Plan of Care/Assessment and Intervention

## 2019-05-22 LAB
ALBUMIN SERPL ELPH-MCNC: 1.8 G/DL — LOW (ref 3.3–5)
ALP SERPL-CCNC: 144 U/L — HIGH (ref 40–120)
ALT FLD-CCNC: 24 U/L — SIGNIFICANT CHANGE UP (ref 12–78)
ANION GAP SERPL CALC-SCNC: 7 MMOL/L — SIGNIFICANT CHANGE UP (ref 5–17)
AST SERPL-CCNC: 73 U/L — HIGH (ref 15–37)
BILIRUB SERPL-MCNC: 5.7 MG/DL — HIGH (ref 0.2–1.2)
BUN SERPL-MCNC: 30 MG/DL — HIGH (ref 7–23)
CALCIUM SERPL-MCNC: 8.5 MG/DL — SIGNIFICANT CHANGE UP (ref 8.5–10.1)
CHLORIDE SERPL-SCNC: 104 MMOL/L — SIGNIFICANT CHANGE UP (ref 96–108)
CO2 SERPL-SCNC: 28 MMOL/L — SIGNIFICANT CHANGE UP (ref 22–31)
CREAT SERPL-MCNC: 1.19 MG/DL — SIGNIFICANT CHANGE UP (ref 0.5–1.3)
CULTURE RESULTS: SIGNIFICANT CHANGE UP
GLUCOSE SERPL-MCNC: 140 MG/DL — HIGH (ref 70–99)
GRAM STN FLD: SIGNIFICANT CHANGE UP
HCT VFR BLD CALC: 23.1 % — LOW (ref 39–50)
HGB BLD-MCNC: 7.3 G/DL — LOW (ref 13–17)
MCHC RBC-ENTMCNC: 31.1 PG — SIGNIFICANT CHANGE UP (ref 27–34)
MCHC RBC-ENTMCNC: 31.6 GM/DL — LOW (ref 32–36)
MCV RBC AUTO: 98.3 FL — SIGNIFICANT CHANGE UP (ref 80–100)
MITOCHONDRIA AB SER-ACNC: SIGNIFICANT CHANGE UP
NRBC # BLD: 0 /100 WBCS — SIGNIFICANT CHANGE UP (ref 0–0)
PLATELET # BLD AUTO: 153 K/UL — SIGNIFICANT CHANGE UP (ref 150–400)
POTASSIUM SERPL-MCNC: 3.5 MMOL/L — SIGNIFICANT CHANGE UP (ref 3.5–5.3)
POTASSIUM SERPL-SCNC: 3.5 MMOL/L — SIGNIFICANT CHANGE UP (ref 3.5–5.3)
PROT SERPL-MCNC: 6.9 GM/DL — SIGNIFICANT CHANGE UP (ref 6–8.3)
RBC # BLD: 2.35 M/UL — LOW (ref 4.2–5.8)
RBC # FLD: 24.9 % — HIGH (ref 10.3–14.5)
SMOOTH MUSCLE AB SER-ACNC: ABNORMAL
SODIUM SERPL-SCNC: 139 MMOL/L — SIGNIFICANT CHANGE UP (ref 135–145)
SPECIMEN SOURCE: SIGNIFICANT CHANGE UP
VANCOMYCIN TROUGH SERPL-MCNC: 18.7 UG/ML — SIGNIFICANT CHANGE UP (ref 10–20)
WBC # BLD: 10.97 K/UL — HIGH (ref 3.8–10.5)
WBC # FLD AUTO: 10.97 K/UL — HIGH (ref 3.8–10.5)

## 2019-05-22 PROCEDURE — 99233 SBSQ HOSP IP/OBS HIGH 50: CPT

## 2019-05-22 RX ADMIN — Medication 250 MILLIGRAM(S): at 17:30

## 2019-05-22 RX ADMIN — Medication 1 TABLET(S): at 12:15

## 2019-05-22 RX ADMIN — Medication 10 MILLIGRAM(S): at 05:10

## 2019-05-22 RX ADMIN — LACTULOSE 10 GRAM(S): 10 SOLUTION ORAL at 05:09

## 2019-05-22 RX ADMIN — Medication 20 MILLIGRAM(S): at 05:09

## 2019-05-22 RX ADMIN — PIPERACILLIN AND TAZOBACTAM 25 GRAM(S): 4; .5 INJECTION, POWDER, LYOPHILIZED, FOR SOLUTION INTRAVENOUS at 05:08

## 2019-05-22 RX ADMIN — PIPERACILLIN AND TAZOBACTAM 25 GRAM(S): 4; .5 INJECTION, POWDER, LYOPHILIZED, FOR SOLUTION INTRAVENOUS at 22:50

## 2019-05-22 RX ADMIN — BUMETANIDE 1 MILLIGRAM(S): 0.25 INJECTION INTRAMUSCULAR; INTRAVENOUS at 05:09

## 2019-05-22 RX ADMIN — LACTULOSE 10 GRAM(S): 10 SOLUTION ORAL at 17:31

## 2019-05-22 RX ADMIN — Medication 20 MILLIGRAM(S): at 22:49

## 2019-05-22 RX ADMIN — BUMETANIDE 1 MILLIGRAM(S): 0.25 INJECTION INTRAMUSCULAR; INTRAVENOUS at 17:31

## 2019-05-22 RX ADMIN — PIPERACILLIN AND TAZOBACTAM 25 GRAM(S): 4; .5 INJECTION, POWDER, LYOPHILIZED, FOR SOLUTION INTRAVENOUS at 14:11

## 2019-05-22 RX ADMIN — CHLORHEXIDINE GLUCONATE 1 APPLICATION(S): 213 SOLUTION TOPICAL at 05:10

## 2019-05-22 RX ADMIN — PANTOPRAZOLE SODIUM 40 MILLIGRAM(S): 20 TABLET, DELAYED RELEASE ORAL at 08:47

## 2019-05-22 RX ADMIN — Medication 1 MILLIGRAM(S): at 12:15

## 2019-05-22 RX ADMIN — Medication 10 MILLIGRAM(S): at 14:10

## 2019-05-22 RX ADMIN — Medication 20 MILLIGRAM(S): at 14:11

## 2019-05-22 RX ADMIN — Medication 250 MILLIGRAM(S): at 05:08

## 2019-05-22 RX ADMIN — Medication 10 MILLIGRAM(S): at 22:49

## 2019-05-22 NOTE — PROGRESS NOTE ADULT - SUBJECTIVE AND OBJECTIVE BOX
INTERVAL History:  Weak  Icterus  hemolytic Anemia.    Allergies    No Known Allergies    Intolerances        MEDICATIONS  (STANDING):  buMETAnide 1 milliGRAM(s) Oral every 12 hours  chlordiazePOXIDE 10 milliGRAM(s) Oral three times a day  chlorhexidine 2% Cloths 1 Application(s) Topical daily  chlorhexidine 4% Liquid 1 Application(s) Topical <User Schedule>  folic acid 1 milliGRAM(s) Oral daily  lactulose Syrup 10 Gram(s) Oral two times a day  multivitamin 1 Tablet(s) Oral daily  pantoprazole    Tablet 40 milliGRAM(s) Oral before breakfast  piperacillin/tazobactam IVPB. 3.375 Gram(s) IV Intermittent every 8 hours  predniSONE   Tablet 20 milliGRAM(s) Oral three times a day  vancomycin  IVPB 1000 milliGRAM(s) IV Intermittent every 12 hours    MEDICATIONS  (PRN):  polyethylene glycol 3350 17 Gram(s) Oral daily PRN Constipation      Vital Signs Last 24 Hrs  T(C): 36.6 (22 May 2019 05:11), Max: 36.6 (22 May 2019 05:11)  T(F): 97.9 (22 May 2019 05:11), Max: 97.9 (22 May 2019 05:11)  HR: 78 (22 May 2019 05:11) (73 - 83)  BP: 113/56 (22 May 2019 05:11) (113/56 - 129/75)  BP(mean): --  RR: 17 (22 May 2019 05:11) (17 - 18)  SpO2: 99% (22 May 2019 05:11) (98% - 100%)    PHYSICAL EXAM:      EYES: EOMI, PERRLA, conjunctiva and sclera:- yellow  NECK: Supple, No JVD, Normal thyroid  CHEST/LUNG: Clear to percussion bilaterally; No rales, rhonchi,   HEART: Regular rate and rhythm;   ABDOMEN: Soft,   Ascites:- +++  Edema:- +++        LABS:                        7.3    10.97 )-----------( 153      ( 22 May 2019 06:22 )             23.1     05-22    139  |  104  |  30<H>  ----------------------------<  140<H>  3.5   |  28  |  1.19    Ca    8.5      22 May 2019 06:22    TPro  6.9  /  Alb  1.8<L>  /  TBili  5.7<H>  /  DBili  x   /  AST  73<H>  /  ALT  24  /  AlkPhos  144<H>  05-22            RADIOLOGY & ADDITIONAL STUDIES:    PATHOLOGY:

## 2019-05-22 NOTE — PROGRESS NOTE ADULT - PROBLEM SELECTOR PLAN 1
resolving   can be discharged on oral linezolid for 1 week  leukocytosis sec to above   slowly resolving   no diarrhea

## 2019-05-22 NOTE — PROGRESS NOTE ADULT - PROBLEM SELECTOR PLAN 2
8.4  ( 4.68 ) / 81 / 17 /  127 .==> 10.1 / 11.2 / 21 / 108  ==> 9.2 / 84 / 17 / 89  ==> 6.5 / 76 / 23 / 123  ==> 7.0 / 93 / 25 / 144 ==> 5.7  / 73 / 24 / 144   better today ,    elevated LFTS probably secondary to hemolysis, meds ( eg Librium  ) and underlying cirrhosis . Viral studies negative. CT scan / Sono [id number] essentially negative.  On lactulose.

## 2019-05-22 NOTE — CHART NOTE - NSCHARTNOTEFT_GEN_A_CORE
Pt admitted c generalized weakness, dark urine. Pt c ascites due to ETOH abuse/cirrhosis; paracentesis performed 5/17 (1200 ml removed). Hospital course complicated by elevated LFTs.    Factors impacting intake: [X ] none [ ] nausea  [ ] vomiting [ ] diarrhea [ ] constipation  [ ]chewing problems [ ] swallowing issues  [ ] other:     Diet Prescription: Diet, Regular:   Supplement Feeding Modality:  Oral  DanActive Cans or Servings Per Day:  1       Frequency:  Two Times a day (05-10-19 @ 07:20)    Intake:   pt c good appetite; consuming 100% most meals    Current Weight:  106 kg (5/22); previous wt 106 kg (5/15)  % Weight Change: wt stable x 1 week    Physical Appearance:  1+ gneralized edema noted + 2+ right leg, ankle, & foot; 4+ left leg, ankle, & foot    Pertinent Medications: MEDICATIONS  (STANDING):  buMETAnide 1 milliGRAM(s) Oral every 12 hours  chlordiazePOXIDE 10 milliGRAM(s) Oral three times a day  chlorhexidine 2% Cloths 1 Application(s) Topical daily  chlorhexidine 4% Liquid 1 Application(s) Topical <User Schedule>  folic acid 1 milliGRAM(s) Oral daily  lactulose Syrup 10 Gram(s) Oral two times a day  multivitamin 1 Tablet(s) Oral daily  pantoprazole    Tablet 40 milliGRAM(s) Oral before breakfast  piperacillin/tazobactam IVPB. 3.375 Gram(s) IV Intermittent every 8 hours  predniSONE   Tablet 20 milliGRAM(s) Oral three times a day  vancomycin  IVPB 1000 milliGRAM(s) IV Intermittent every 12 hours    MEDICATIONS  (PRN):  polyethylene glycol 3350 17 Gram(s) Oral daily PRN Constipation    Pertinent Labs: 05-22 Na139 mmol/L Glu 140 mg/dL<H> K+ 3.5 mmol/L Cr  1.19 mg/dL BUN 30 mg/dL<H> 05-17 Phos 3.7 mg/dL 05-22 Alb 1.8 g/dL<L>    Skin:   WDL    Estimated Needs:   [X ] no change since previous assessment (5/15)  [ ] recalculated:     Previous Nutrition Diagnosis:   NONE      Nutrition Diagnosis is [ ] ongoing  [ ] resolved [ X] not applicable     New Nutrition Diagnosis: [X ] not applicable    Interventions:  continue current diet rx as noted  Recommend  [ ] Change Diet To:  [ ] Nutrition Supplement  [ ] Nutrition Support  [ ] Other:     Monitoring and Evaluation:   [X ] PO intake [ x ] Tolerance to diet prescription [ x ] weights [ x ] labs[ x ] follow up per protocol  [ ] other:

## 2019-05-22 NOTE — PROGRESS NOTE ADULT - ASSESSMENT
55 yo AAM  with h/o HTN and ETOH abuse brought in by wife for generalized weakness and dark urine; pt  found to have lactic acidosis (8.8), possible RICHAR with Hb 2.9 and repeat 2.4. In the ER pt was hemodynamically stable    Patient improving. Some recovery of H/H following a total of 9 UNITS of PRBC  this admission.  Hematologic derangement's are most likely multifactorial: Acute blood loss anemia into left leg Hematoma, Active hemalosis, Poor clotting factor synthesis secondary to cirrhosis of the liver   Appreciate Hematology, Vascular, Renal, GI, ID consults as this is a multi desplinary case

## 2019-05-22 NOTE — PROGRESS NOTE ADULT - SUBJECTIVE AND OBJECTIVE BOX
Patient is a 54y old  Male who presents with a chief complaint of anemia/ (22 May 2019 11:10)      INTERVAL HPI/OVERNIGHT EVENTS: no events     MEDICATIONS  (STANDING):  buMETAnide 1 milliGRAM(s) Oral every 12 hours  chlordiazePOXIDE 10 milliGRAM(s) Oral three times a day  chlorhexidine 2% Cloths 1 Application(s) Topical daily  chlorhexidine 4% Liquid 1 Application(s) Topical <User Schedule>  folic acid 1 milliGRAM(s) Oral daily  lactulose Syrup 10 Gram(s) Oral two times a day  multivitamin 1 Tablet(s) Oral daily  pantoprazole    Tablet 40 milliGRAM(s) Oral before breakfast  piperacillin/tazobactam IVPB. 3.375 Gram(s) IV Intermittent every 8 hours  predniSONE   Tablet 20 milliGRAM(s) Oral three times a day  vancomycin  IVPB 1000 milliGRAM(s) IV Intermittent every 12 hours    MEDICATIONS  (PRN):  polyethylene glycol 3350 17 Gram(s) Oral daily PRN Constipation      Allergies    No Known Allergies    Intolerances         Vital Signs Last 24 Hrs  T(C): 36.6 (22 May 2019 11:26), Max: 36.6 (22 May 2019 05:11)  T(F): 97.8 (22 May 2019 11:26), Max: 97.9 (22 May 2019 05:11)  HR: 69 (22 May 2019 11:26) (69 - 83)  BP: 113/58 (22 May 2019 11:26) (113/56 - 129/75)  BP(mean): --  RR: 18 (22 May 2019 11:26) (17 - 18)  SpO2: 98% (22 May 2019 11:26) (98% - 100%)    PHYSICAL EXAM:  GENERAL: NAD, well-groomed, well-developed  HEAD:  Atraumatic, Normocephalic  EYES: EOMI, PERRLA, conjunctiva and sclera clear  ENMT: No tonsillar erythema, exudates, or enlargement; Moist mucous membranes, Good dentition, No lesions  NECK: Supple, No JVD, Normal thyroid  NERVOUS SYSTEM:  Alert & Oriented X3, Good concentration; Motor Strength 5/5 B/L upper and lower extremities; DTRs 2+ intact and symmetric  CHEST/LUNG: Clear to percussion bilaterally; No rales, rhonchi, wheezing, or rubs  HEART: Regular rate and rhythm; No murmurs, rubs, or gallops  ABDOMEN: Soft, Nontender, Nondistended; Bowel sounds present decreased ascitis   EXTREMITIES:  2+ Peripheral Pulses,  b/l edema   SKIN:  jaundice poor toenail care     LABS:                        7.3    10.97 )-----------( 153      ( 22 May 2019 06:22 )             23.1     05-22    139  |  104  |  30<H>  ----------------------------<  140<H>  3.5   |  28  |  1.19    Ca    8.5      22 May 2019 06:22    TPro  6.9  /  Alb  1.8<L>  /  TBili  5.7<H>  /  DBili  x   /  AST  73<H>  /  ALT  24  /  AlkPhos  144<H>  05-22        CAPILLARY BLOOD GLUCOSE          RADIOLOGY & ADDITIONAL TESTS:    Imaging Personally Reviewed:  [ X] YES  [ ] NO    Consultant(s) Notes Reviewed:  [ X] YES  [ ] NO    Care Discussed with Consultants/Other Providers [X ] YES  [ ] NO

## 2019-05-22 NOTE — PROGRESS NOTE ADULT - PROBLEM SELECTOR PLAN 1
Probably secondary to hemolysis and underlying medical condition. HGB 7.2  . Continue as per Heme / Onc

## 2019-05-22 NOTE — PROGRESS NOTE ADULT - SUBJECTIVE AND OBJECTIVE BOX
Patient is a 54y old  Male who presents with a chief complaint of anemia/ (22 May 2019 13:32)      INTERVAL HPI / OVERNIGHT EVENTS: doing ok ,leg pain resolving    MEDICATIONS  (STANDING):  buMETAnide 1 milliGRAM(s) Oral every 12 hours  chlordiazePOXIDE 10 milliGRAM(s) Oral three times a day  chlorhexidine 2% Cloths 1 Application(s) Topical daily  chlorhexidine 4% Liquid 1 Application(s) Topical <User Schedule>  folic acid 1 milliGRAM(s) Oral daily  lactulose Syrup 10 Gram(s) Oral two times a day  multivitamin 1 Tablet(s) Oral daily  pantoprazole    Tablet 40 milliGRAM(s) Oral before breakfast  piperacillin/tazobactam IVPB. 3.375 Gram(s) IV Intermittent every 8 hours  predniSONE   Tablet 20 milliGRAM(s) Oral three times a day  vancomycin  IVPB 1000 milliGRAM(s) IV Intermittent every 12 hours    MEDICATIONS  (PRN):  polyethylene glycol 3350 17 Gram(s) Oral daily PRN Constipation      Vital Signs Last 24 Hrs  Tmax: afebrile     Review of systems:  General : no fever /chills,fatigue  CVS : no chest pain, palpitations  Lungs : no shortness of breath, cough  GI : no abdominal pain,vomiting, diarrhea   : no dysuria, hematuria        PHYSICAL EXAM:  General :NAD  Constitutional:  well-groomed, well-developed  Respiratory: CTAB/L  Cardiovascular: S1 and S2, RRR, no M/G/R  Gastrointestinal: BS+, soft, NT/ND  Extremities: Left leg  edema> Right leg  Vascular: 2+ peripheral pulses  Skin: LLE erythema is resolved      LABS:                        7.6    13.29 )-----------( 143      ( 23 May 2019 08:46 )             23.7     05-23    138  |  101  |  30<H>  ----------------------------<  133<H>  3.2<L>   |  29  |  1.20    Ca    8.3<L>      23 May 2019 08:46    TPro  7.3  /  Alb  1.9<L>  /  TBili  6.0<H>  /  DBili  x   /  AST  80<H>  /  ALT  27  /  AlkPhos  137<H>  05-23          MICROBIOLOGY:  RECENT CULTURES:  05-17 .Body Fluid XXXX   polymorphonuclear leukocytes seen per low power field  No organisms seen  by cytocentrifuge   No growth at 5 days          RADIOLOGY & ADDITIONAL STUDIES:

## 2019-05-22 NOTE — PROGRESS NOTE ADULT - PROBLEM SELECTOR PLAN 3
s/p paracentesis. albumin gradient > 1.1, cell cytology pending. s/p paracentesis. albumin gradient > 1.1, cell cytology negative

## 2019-05-22 NOTE — PROGRESS NOTE ADULT - SUBJECTIVE AND OBJECTIVE BOX
Patient is a 54y old  Male who presents with a chief complaint of anemia/ (22 May 2019 10:43)      HPI:  54 year old M HTN and ETOH abuse brought in by wife for generalized weakness and dark urine.  Wife reports patient has been having non-bloody non-bilious vomiting last week which stopped his drinking on Friday 5/3.  Wife also notes he has been having easy bruising and L leg swelling over the same amount of time.  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  Wife subsequently added that he has been having weakness and fatigue since early March with episodes of gum bleeding going back to early January.  Of note, wife noted that his skin color has changed over the past week.  Pt denies fevers, chills, eye pain vision changes, (08 May 2019 23:01)      INTERVAL HPI/OVERNIGHT EVENTS:  The patient denies melena, hematochezia, hematemesis, nausea, vomiting, abdominal pain, constipation, diarrhea, or change in bowel movements Tolerating regular diet    MEDICATIONS  (STANDING):  buMETAnide 1 milliGRAM(s) Oral every 12 hours  chlordiazePOXIDE 10 milliGRAM(s) Oral three times a day  chlorhexidine 2% Cloths 1 Application(s) Topical daily  chlorhexidine 4% Liquid 1 Application(s) Topical <User Schedule>  folic acid 1 milliGRAM(s) Oral daily  lactulose Syrup 10 Gram(s) Oral two times a day  multivitamin 1 Tablet(s) Oral daily  pantoprazole    Tablet 40 milliGRAM(s) Oral before breakfast  piperacillin/tazobactam IVPB. 3.375 Gram(s) IV Intermittent every 8 hours  predniSONE   Tablet 20 milliGRAM(s) Oral three times a day  vancomycin  IVPB 1000 milliGRAM(s) IV Intermittent every 12 hours    MEDICATIONS  (PRN):  polyethylene glycol 3350 17 Gram(s) Oral daily PRN Constipation      FAMILY HISTORY:      Allergies    No Known Allergies    Intolerances        PMH/PSH:  Benign essential HTN        REVIEW OF SYSTEMS:  CONSTITUTIONAL: No fever, weight loss, or fatigue  EYES: No eye pain, visual disturbances, or discharge  ENMT:  No difficulty hearing, tinnitus, vertigo; No sinus or throat pain  NECK: No pain or stiffness  BREASTS: No pain, masses, or nipple discharge  RESPIRATORY: No cough, wheezing, chills or hemoptysis; No shortness of breath  CARDIOVASCULAR: No chest pain, palpitations, dizziness, or leg swelling  GASTROINTESTINAL: See above  GENITOURINARY: No dysuria, frequency, hematuria, or incontinence  NEUROLOGICAL: No headaches,  numbness, or tremors  SKIN: No itching, burning, rashes, or lesions   LYMPH NODES: No enlarged glands  ENDOCRINE: No heat or cold intolerance; No hair loss  MUSCULOSKELETAL: No joint pain or swelling; No muscle, back, or extremity pain  PSYCHIATRIC: No depression, anxiety, mood swings, or difficulty sleeping  HEME/LYMPH: No easy bruising, or bleeding gums  ALLERGY AND IMMUNOLOGIC: No hives or eczema    Vital Signs Last 24 Hrs  T(C): 36.6 (22 May 2019 05:11), Max: 36.6 (22 May 2019 05:11)  T(F): 97.9 (22 May 2019 05:11), Max: 97.9 (22 May 2019 05:11)  HR: 78 (22 May 2019 05:11) (73 - 83)  BP: 113/56 (22 May 2019 05:11) (113/56 - 129/75)  BP(mean): --  RR: 17 (22 May 2019 05:11) (17 - 18)  SpO2: 99% (22 May 2019 05:11) (98% - 100%)    PHYSICAL EXAM:  GENERAL: NAD, well-groomed, well-developed  HEAD:  Atraumatic, Normocephalic  EYES: EOMI, PERRLA, conjunctiva and sclera clear  NECK: Supple, No JVD, Normal thyroid  NERVOUS SYSTEM:  Alert & Oriented X 3, Good concentration; No asterixis  CHEST/LUNG: Clear to percussion bilaterally; No rales, rhonchi, wheezing, or rubs  HEART: Regular rate and rhythm; No murmurs, rubs, or gallops  ABDOMEN: Soft, Nontender, Nondistended; Bowel sounds present  EXTREMITIES:  2+ Peripheral Pulses, No clubbing, cyanosis, or edema  LYMPH: No lymphadenopathy noted  SKIN: No rashes or lesions    LAB                          7.3    10.97 )-----------( 153      ( 22 May 2019 06:22 )             23.1       CBC:  05-22 @ 06:22  WBC 10.97   Hgb 7.3   Hct 23.1   Plts 153  MCV 98.3  05-21 @ 07:32  WBC 11.97   Hgb 8.8   Hct 27.8   Plts 186  MCV 99.3  05-20 @ 05:46  WBC 9.18   Hgb 8.6   Hct 26.7   Plts 198  MCV 99.3  05-19 @ 06:41  WBC 9.61   Hgb 7.9   Hct 24.7   Plts 202  MCV 99.2  05-18 @ 05:46  WBC 9.63   Hgb 8.0   Hct 24.3   Plts 197  MCV 97.2  05-17 @ 06:52  WBC 6.91   Hgb 7.5   Hct 22.9   Plts 171  MCV 98.3  05-16 @ 07:02  WBC 10.16   Hgb 7.5   Hct 22.9   Plts 166  MCV 96.6      Chemistry:  05-22 @ 06:22  Na+ 139  K+ 3.5  Cl- 104  CO2 28  BUN 30  Cr 1.19     05-21 @ 07:32  Na+ 142  K+ 3.9  Cl- 107  CO2 26  BUN 32  Cr 1.41     05-20 @ 05:46  Na+ 142  K+ 3.8  Cl- 107  CO2 27  BUN 35  Cr 1.26     05-19 @ 06:41  Na+ 144  K+ 3.8  Cl- 110  CO2 23  BUN 37  Cr 1.38     05-18 @ 05:46  Na+ 142  K+ 4.3  Cl- 111  CO2 23  BUN 36  Cr 1.24     05-17 @ 06:52  Na+ 143  K+ 4.2  Cl- 111  CO2 22  BUN 32  Cr 1.28     05-16 @ 07:02  Na+ 142  K+ 3.3  Cl- 109  CO2 22  BUN 30  Cr 1.12         Glucose, Serum: 140 mg/dL (05-22 @ 06:22)  Glucose, Serum: 118 mg/dL (05-21 @ 07:32)  Glucose, Serum: 130 mg/dL (05-20 @ 05:46)  Glucose, Serum: 126 mg/dL (05-19 @ 06:41)  Glucose, Serum: 112 mg/dL (05-18 @ 05:46)  Glucose, Serum: 128 mg/dL (05-17 @ 06:52)  Glucose, Serum: 120 mg/dL (05-16 @ 07:02)      22 May 2019 06:22    139    |  104    |  30     ----------------------------<  140    3.5     |  28     |  1.19   21 May 2019 07:32    142    |  107    |  32     ----------------------------<  118    3.9     |  26     |  1.41   20 May 2019 05:46    142    |  107    |  35     ----------------------------<  130    3.8     |  27     |  1.26   19 May 2019 06:41    144    |  110    |  37     ----------------------------<  126    3.8     |  23     |  1.38   18 May 2019 05:46    142    |  111    |  36     ----------------------------<  112    4.3     |  23     |  1.24   17 May 2019 06:52    143    |  111    |  32     ----------------------------<  128    4.2     |  22     |  1.28   16 May 2019 07:02    142    |  109    |  30     ----------------------------<  120    3.3     |  22     |  1.12     Ca    8.5        22 May 2019 06:22  Ca    8.6        21 May 2019 07:32  Ca    8.7        20 May 2019 05:46  Ca    8.5        19 May 2019 06:41  Ca    8.5        18 May 2019 05:46  Ca    8.4        17 May 2019 06:52  Ca    8.7        16 May 2019 07:02  Phos  3.7       17 May 2019 06:52  Phos  3.1       16 May 2019 07:02  Mg     1.8       17 May 2019 06:52  Mg     1.7       16 May 2019 07:02    TPro  6.9    /  Alb  1.8    /  TBili  5.7    /  DBili  x      /  AST  73     /  ALT  24     /  AlkPhos  144    22 May 2019 06:22  TPro  7.8    /  Alb  1.9    /  TBili  7.0    /  DBili  x      /  AST  93     /  ALT  25     /  AlkPhos  144    21 May 2019 07:32  TPro  7.6    /  Alb  1.8    /  TBili  6.5    /  DBili  3.60   /  AST  76     /  ALT  23     /  AlkPhos  123    20 May 2019 05:46  TPro  7.6    /  Alb  2.0    /  TBili  9.2    /  DBili  4.89   /  AST  84     /  ALT  17     /  AlkPhos  89     17 May 2019 06:52  TPro  7.6    /  Alb  2.1    /  TBili  10.1   /  DBili  6.48   /  AST  112    /  ALT  21     /  AlkPhos  108    16 May 2019 07:02              CAPILLARY BLOOD GLUCOSE              RADIOLOGY & ADDITIONAL TESTS:    Imaging Personally Reviewed:  [ ] YES  [ ] NO    Consultant(s) Notes Reviewed:  [ ] YES  [ ] NO    Care Discussed with Consultants/Other Providers [ ] YES  [ ] NO

## 2019-05-23 LAB
ALBUMIN SERPL ELPH-MCNC: 1.9 G/DL — LOW (ref 3.3–5)
ALP SERPL-CCNC: 137 U/L — HIGH (ref 40–120)
ALT FLD-CCNC: 27 U/L — SIGNIFICANT CHANGE UP (ref 12–78)
ANION GAP SERPL CALC-SCNC: 8 MMOL/L — SIGNIFICANT CHANGE UP (ref 5–17)
AST SERPL-CCNC: 80 U/L — HIGH (ref 15–37)
BILIRUB SERPL-MCNC: 6 MG/DL — HIGH (ref 0.2–1.2)
BUN SERPL-MCNC: 30 MG/DL — HIGH (ref 7–23)
CALCIUM SERPL-MCNC: 8.3 MG/DL — LOW (ref 8.5–10.1)
CHLORIDE SERPL-SCNC: 101 MMOL/L — SIGNIFICANT CHANGE UP (ref 96–108)
CO2 SERPL-SCNC: 29 MMOL/L — SIGNIFICANT CHANGE UP (ref 22–31)
CREAT SERPL-MCNC: 1.2 MG/DL — SIGNIFICANT CHANGE UP (ref 0.5–1.3)
GLUCOSE SERPL-MCNC: 133 MG/DL — HIGH (ref 70–99)
HCT VFR BLD CALC: 23.7 % — LOW (ref 39–50)
HGB BLD-MCNC: 7.6 G/DL — LOW (ref 13–17)
MCHC RBC-ENTMCNC: 31.5 PG — SIGNIFICANT CHANGE UP (ref 27–34)
MCHC RBC-ENTMCNC: 32.1 GM/DL — SIGNIFICANT CHANGE UP (ref 32–36)
MCV RBC AUTO: 98.3 FL — SIGNIFICANT CHANGE UP (ref 80–100)
NRBC # BLD: 0 /100 WBCS — SIGNIFICANT CHANGE UP (ref 0–0)
PLATELET # BLD AUTO: 143 K/UL — LOW (ref 150–400)
POTASSIUM SERPL-MCNC: 3.2 MMOL/L — LOW (ref 3.5–5.3)
POTASSIUM SERPL-SCNC: 3.2 MMOL/L — LOW (ref 3.5–5.3)
PROT SERPL-MCNC: 7.3 GM/DL — SIGNIFICANT CHANGE UP (ref 6–8.3)
RBC # BLD: 2.41 M/UL — LOW (ref 4.2–5.8)
RBC # FLD: 25.2 % — HIGH (ref 10.3–14.5)
SODIUM SERPL-SCNC: 138 MMOL/L — SIGNIFICANT CHANGE UP (ref 135–145)
WBC # BLD: 13.29 K/UL — HIGH (ref 3.8–10.5)
WBC # FLD AUTO: 13.29 K/UL — HIGH (ref 3.8–10.5)

## 2019-05-23 PROCEDURE — 99239 HOSP IP/OBS DSCHRG MGMT >30: CPT

## 2019-05-23 RX ORDER — LINEZOLID 600 MG/300ML
1 INJECTION, SOLUTION INTRAVENOUS
Qty: 7 | Refills: 0
Start: 2019-05-23 | End: 2019-05-29

## 2019-05-23 RX ORDER — POLYETHYLENE GLYCOL 3350 17 G/17G
17 POWDER, FOR SOLUTION ORAL
Qty: 0 | Refills: 0 | DISCHARGE
Start: 2019-05-23

## 2019-05-23 RX ORDER — PANTOPRAZOLE SODIUM 20 MG/1
1 TABLET, DELAYED RELEASE ORAL
Qty: 0 | Refills: 0 | DISCHARGE
Start: 2019-05-23

## 2019-05-23 RX ORDER — ACETAMINOPHEN 500 MG
650 TABLET ORAL ONCE
Refills: 0 | Status: COMPLETED | OUTPATIENT
Start: 2019-05-23 | End: 2019-05-23

## 2019-05-23 RX ORDER — FOLIC ACID 0.8 MG
1 TABLET ORAL
Qty: 0 | Refills: 0 | DISCHARGE
Start: 2019-05-23

## 2019-05-23 RX ORDER — BUMETANIDE 0.25 MG/ML
1 INJECTION INTRAMUSCULAR; INTRAVENOUS
Qty: 0 | Refills: 0 | DISCHARGE
Start: 2019-05-23

## 2019-05-23 RX ADMIN — Medication 250 MILLIGRAM(S): at 05:13

## 2019-05-23 RX ADMIN — BUMETANIDE 1 MILLIGRAM(S): 0.25 INJECTION INTRAMUSCULAR; INTRAVENOUS at 17:49

## 2019-05-23 RX ADMIN — PIPERACILLIN AND TAZOBACTAM 25 GRAM(S): 4; .5 INJECTION, POWDER, LYOPHILIZED, FOR SOLUTION INTRAVENOUS at 05:14

## 2019-05-23 RX ADMIN — LACTULOSE 10 GRAM(S): 10 SOLUTION ORAL at 17:49

## 2019-05-23 RX ADMIN — CHLORHEXIDINE GLUCONATE 1 APPLICATION(S): 213 SOLUTION TOPICAL at 05:20

## 2019-05-23 RX ADMIN — Medication 20 MILLIGRAM(S): at 05:17

## 2019-05-23 RX ADMIN — Medication 20 MILLIGRAM(S): at 14:36

## 2019-05-23 RX ADMIN — PANTOPRAZOLE SODIUM 40 MILLIGRAM(S): 20 TABLET, DELAYED RELEASE ORAL at 08:02

## 2019-05-23 RX ADMIN — Medication 650 MILLIGRAM(S): at 12:51

## 2019-05-23 RX ADMIN — Medication 1 MILLIGRAM(S): at 12:51

## 2019-05-23 RX ADMIN — LACTULOSE 10 GRAM(S): 10 SOLUTION ORAL at 05:17

## 2019-05-23 RX ADMIN — Medication 1 TABLET(S): at 12:51

## 2019-05-23 RX ADMIN — BUMETANIDE 1 MILLIGRAM(S): 0.25 INJECTION INTRAMUSCULAR; INTRAVENOUS at 05:17

## 2019-05-23 RX ADMIN — PIPERACILLIN AND TAZOBACTAM 25 GRAM(S): 4; .5 INJECTION, POWDER, LYOPHILIZED, FOR SOLUTION INTRAVENOUS at 23:01

## 2019-05-23 RX ADMIN — Medication 650 MILLIGRAM(S): at 13:51

## 2019-05-23 RX ADMIN — Medication 250 MILLIGRAM(S): at 17:49

## 2019-05-23 RX ADMIN — Medication 10 MILLIGRAM(S): at 05:18

## 2019-05-23 RX ADMIN — PIPERACILLIN AND TAZOBACTAM 25 GRAM(S): 4; .5 INJECTION, POWDER, LYOPHILIZED, FOR SOLUTION INTRAVENOUS at 14:36

## 2019-05-23 RX ADMIN — Medication 20 MILLIGRAM(S): at 23:00

## 2019-05-23 NOTE — PROGRESS NOTE ADULT - SUBJECTIVE AND OBJECTIVE BOX
Patient is a 54y old  Male who presents with a chief complaint of anemia/ (23 May 2019 10:50)      HPI:  54 year old M HTN and ETOH abuse brought in by wife for generalized weakness and dark urine.  Wife reports patient has been having non-bloody non-bilious vomiting last week which stopped his drinking on Friday 5/3.  Wife also notes he has been having easy bruising and L leg swelling over the same amount of time.  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  Wife subsequently added that he has been having weakness and fatigue since early March with episodes of gum bleeding going back to early January.  Of note, wife noted that his skin color has changed over the past week.  Pt denies fevers, chills, eye pain vision changes, (08 May 2019 23:01)      INTERVAL HPI/OVERNIGHT EVENTS:  The patient denies melena, hematochezia, hematemesis, nausea, vomiting, abdominal pain, constipation, diarrhea, or change in bowel movements Tolerating diet    MEDICATIONS  (STANDING):  buMETAnide 1 milliGRAM(s) Oral every 12 hours  chlorhexidine 2% Cloths 1 Application(s) Topical daily  chlorhexidine 4% Liquid 1 Application(s) Topical <User Schedule>  folic acid 1 milliGRAM(s) Oral daily  lactulose Syrup 10 Gram(s) Oral two times a day  multivitamin 1 Tablet(s) Oral daily  pantoprazole    Tablet 40 milliGRAM(s) Oral before breakfast  piperacillin/tazobactam IVPB. 3.375 Gram(s) IV Intermittent every 8 hours  predniSONE   Tablet 20 milliGRAM(s) Oral three times a day  vancomycin  IVPB 1000 milliGRAM(s) IV Intermittent every 12 hours    MEDICATIONS  (PRN):  polyethylene glycol 3350 17 Gram(s) Oral daily PRN Constipation      FAMILY HISTORY:      Allergies    No Known Allergies    Intolerances        PMH/PSH:  Benign essential HTN        REVIEW OF SYSTEMS:  CONSTITUTIONAL: No fever, weight loss, or fatigue  EYES: No eye pain, visual disturbances, or discharge  ENMT:  No difficulty hearing, tinnitus, vertigo; No sinus or throat pain  NECK: No pain or stiffness  BREASTS: No pain, masses, or nipple discharge  RESPIRATORY: No cough, wheezing, chills or hemoptysis; No shortness of breath  CARDIOVASCULAR: No chest pain, palpitations, dizziness, or leg swelling  GASTROINTESTINAL:  see above  GENITOURINARY: No dysuria, frequency, hematuria, or incontinence  NEUROLOGICAL: No headaches, memory loss, loss of strength, numbness, or tremors  SKIN: No itching, burning, rashes, or lesions   LYMPH NODES: No enlarged glands  ENDOCRINE: No heat or cold intolerance; No hair loss  MUSCULOSKELETAL: No joint pain or swelling; No muscle, back, or extremity pain  PSYCHIATRIC: No depression, anxiety, mood swings, or difficulty sleeping  HEME/LYMPH: No easy bruising, or bleeding gums  ALLERGY AND IMMUNOLOGIC: No hives or eczema    Vital Signs Last 24 Hrs  T(C): 36.1 (23 May 2019 11:41), Max: 36.9 (23 May 2019 00:23)  T(F): 97 (23 May 2019 11:41), Max: 98.4 (23 May 2019 00:23)  HR: 68 (23 May 2019 11:41) (68 - 77)  BP: 116/53 (23 May 2019 11:41) (104/52 - 118/62)  BP(mean): --  RR: 17 (23 May 2019 11:41) (17 - 18)  SpO2: 100% (23 May 2019 11:41) (97% - 100%)    PHYSICAL EXAM:  GENERAL: NAD, well-groomed, well-developed  HEAD:  Atraumatic, Normocephalic  EYES: EOMI, PERRLA, conjunctiva and sclera clear  NECK: Supple, No JVD, Normal thyroid  NERVOUS SYSTEM:  Alert & Oriented X 2, Fair concentration; No asterixis  CHEST/LUNG: Clear to percussion bilaterally; No rales, rhonchi, wheezing, or rubs  HEART: Regular rate and rhythm; No murmurs, rubs, or gallops  ABDOMEN: Soft, Nontender, Nondistended; Bowel sounds present  EXTREMITIES:  2+ Peripheral Pulses, No clubbing, cyanosis, or edema  LYMPH: No lymphadenopathy noted  SKIN: No rashes or lesions    LAB                          7.6    13.29 )-----------( 143      ( 23 May 2019 08:46 )             23.7       CBC:  05-23 @ 08:46  WBC 13.29   Hgb 7.6   Hct 23.7   Plts 143  MCV 98.3  05-22 @ 06:22  WBC 10.97   Hgb 7.3   Hct 23.1   Plts 153  MCV 98.3  05-21 @ 07:32  WBC 11.97   Hgb 8.8   Hct 27.8   Plts 186  MCV 99.3  05-20 @ 05:46  WBC 9.18   Hgb 8.6   Hct 26.7   Plts 198  MCV 99.3  05-19 @ 06:41  WBC 9.61   Hgb 7.9   Hct 24.7   Plts 202  MCV 99.2  05-18 @ 05:46  WBC 9.63   Hgb 8.0   Hct 24.3   Plts 197  MCV 97.2  05-17 @ 06:52  WBC 6.91   Hgb 7.5   Hct 22.9   Plts 171  MCV 98.3      Chemistry:  05-23 @ 08:46  Na+ 138  K+ 3.2  Cl- 101  CO2 29  BUN 30  Cr 1.20     05-22 @ 06:22  Na+ 139  K+ 3.5  Cl- 104  CO2 28  BUN 30  Cr 1.19     05-21 @ 07:32  Na+ 142  K+ 3.9  Cl- 107  CO2 26  BUN 32  Cr 1.41     05-20 @ 05:46  Na+ 142  K+ 3.8  Cl- 107  CO2 27  BUN 35  Cr 1.26     05-19 @ 06:41  Na+ 144  K+ 3.8  Cl- 110  CO2 23  BUN 37  Cr 1.38     05-18 @ 05:46  Na+ 142  K+ 4.3  Cl- 111  CO2 23  BUN 36  Cr 1.24     05-17 @ 06:52  Na+ 143  K+ 4.2  Cl- 111  CO2 22  BUN 32  Cr 1.28         Glucose, Serum: 133 mg/dL (05-23 @ 08:46)  Glucose, Serum: 140 mg/dL (05-22 @ 06:22)  Glucose, Serum: 118 mg/dL (05-21 @ 07:32)  Glucose, Serum: 130 mg/dL (05-20 @ 05:46)  Glucose, Serum: 126 mg/dL (05-19 @ 06:41)  Glucose, Serum: 112 mg/dL (05-18 @ 05:46)  Glucose, Serum: 128 mg/dL (05-17 @ 06:52)      23 May 2019 08:46    138    |  101    |  30     ----------------------------<  133    3.2     |  29     |  1.20   22 May 2019 06:22    139    |  104    |  30     ----------------------------<  140    3.5     |  28     |  1.19   21 May 2019 07:32    142    |  107    |  32     ----------------------------<  118    3.9     |  26     |  1.41   20 May 2019 05:46    142    |  107    |  35     ----------------------------<  130    3.8     |  27     |  1.26   19 May 2019 06:41    144    |  110    |  37     ----------------------------<  126    3.8     |  23     |  1.38   18 May 2019 05:46    142    |  111    |  36     ----------------------------<  112    4.3     |  23     |  1.24   17 May 2019 06:52    143    |  111    |  32     ----------------------------<  128    4.2     |  22     |  1.28     Ca    8.3        23 May 2019 08:46  Ca    8.5        22 May 2019 06:22  Ca    8.6        21 May 2019 07:32  Ca    8.7        20 May 2019 05:46  Ca    8.5        19 May 2019 06:41  Ca    8.5        18 May 2019 05:46  Ca    8.4        17 May 2019 06:52  Phos  3.7       17 May 2019 06:52  Mg     1.8       17 May 2019 06:52    TPro  7.3    /  Alb  1.9    /  TBili  6.0    /  DBili  x      /  AST  80     /  ALT  27     /  AlkPhos  137    23 May 2019 08:46  TPro  6.9    /  Alb  1.8    /  TBili  5.7    /  DBili  x      /  AST  73     /  ALT  24     /  AlkPhos  144    22 May 2019 06:22  TPro  7.8    /  Alb  1.9    /  TBili  7.0    /  DBili  x      /  AST  93     /  ALT  25     /  AlkPhos  144    21 May 2019 07:32  TPro  7.6    /  Alb  1.8    /  TBili  6.5    /  DBili  3.60   /  AST  76     /  ALT  23     /  AlkPhos  123    20 May 2019 05:46  TPro  7.6    /  Alb  2.0    /  TBili  9.2    /  DBili  4.89   /  AST  84     /  ALT  17     /  AlkPhos  89     17 May 2019 06:52              CAPILLARY BLOOD GLUCOSE              RADIOLOGY & ADDITIONAL TESTS:    Imaging Personally Reviewed:  [ ] YES  [ ] NO    Consultant(s) Notes Reviewed:  [ ] YES  [ ] NO    Care Discussed with Consultants/Other Providers [ ] YES  [ ] NO

## 2019-05-23 NOTE — PROGRESS NOTE ADULT - PROBLEM SELECTOR PLAN 2
8.4  ( 4.68 ) / 81 / 17 /  127 .==> 10.1 / 11.2 / 21 / 108  ==> 9.2 / 84 / 17 / 89  ==> 6.5 / 76 / 23 / 123  ==> 7.0 / 93 / 25 / 144 ==> 5.7  / 73 / 24 / 144  ==> 6.0 / 80 / 27 / 137  ,    elevated LFTS probably secondary to , meds ( eg Librium, antibiotics   ) and underlying cirrhosis . Viral studies negative. CT scan / Sono [id number] essentially negative.  On lactulose.

## 2019-05-23 NOTE — PROGRESS NOTE ADULT - SUBJECTIVE AND OBJECTIVE BOX
INTERVAL History:  Weak  Icterus.  Anemia.    Allergies    No Known Allergies    Intolerances        MEDICATIONS  (STANDING):  buMETAnide 1 milliGRAM(s) Oral every 12 hours  chlordiazePOXIDE 10 milliGRAM(s) Oral three times a day  chlorhexidine 2% Cloths 1 Application(s) Topical daily  chlorhexidine 4% Liquid 1 Application(s) Topical <User Schedule>  folic acid 1 milliGRAM(s) Oral daily  lactulose Syrup 10 Gram(s) Oral two times a day  multivitamin 1 Tablet(s) Oral daily  pantoprazole    Tablet 40 milliGRAM(s) Oral before breakfast  piperacillin/tazobactam IVPB. 3.375 Gram(s) IV Intermittent every 8 hours  predniSONE   Tablet 20 milliGRAM(s) Oral three times a day  vancomycin  IVPB 1000 milliGRAM(s) IV Intermittent every 12 hours    MEDICATIONS  (PRN):  polyethylene glycol 3350 17 Gram(s) Oral daily PRN Constipation      Vital Signs Last 24 Hrs  T(C): 36.4 (23 May 2019 05:36), Max: 36.9 (23 May 2019 00:23)  T(F): 97.5 (23 May 2019 05:36), Max: 98.4 (23 May 2019 00:23)  HR: 77 (23 May 2019 05:36) (69 - 77)  BP: 118/62 (23 May 2019 05:36) (104/52 - 118/62)  BP(mean): --  RR: 18 (23 May 2019 05:36) (16 - 18)  SpO2: 97% (23 May 2019 05:36) (97% - 99%)    PHYSICAL EXAM:  Pallor.  Icteric    EYES:   Yellow.  NECK: Supple, No JVD, Normal thyroid  CHEST/LUNG: Clear to percussion bilaterally; No rales, rhonchi,   HEART: Regular rate and rhythm;   ABDOMEN:   Ascites.    Edema and Cellulitis of legs,        LABS:                        7.6    13.29 )-----------( 143      ( 23 May 2019 08:46 )             23.7     05-23    138  |  101  |  30<H>  ----------------------------<  133<H>  3.2<L>   |  29  |  1.20    Ca    8.3<L>      23 May 2019 08:46    TPro  7.3  /  Alb  1.9<L>  /  TBili  6.0<H>  /  DBili  x   /  AST  80<H>  /  ALT  27  /  AlkPhos  137<H>  05-23            RADIOLOGY & ADDITIONAL STUDIES:    PATHOLOGY:

## 2019-05-24 LAB
BLD GP AB SCN SERPL QL: SIGNIFICANT CHANGE UP
HCT VFR BLD CALC: 19.2 % — CRITICAL LOW (ref 39–50)
HGB BLD-MCNC: 6.3 G/DL — CRITICAL LOW (ref 13–17)
MCHC RBC-ENTMCNC: 32.8 GM/DL — SIGNIFICANT CHANGE UP (ref 32–36)
MCHC RBC-ENTMCNC: 33 PG — SIGNIFICANT CHANGE UP (ref 27–34)
MCV RBC AUTO: 100.5 FL — HIGH (ref 80–100)
NRBC # BLD: 0 /100 WBCS — SIGNIFICANT CHANGE UP (ref 0–0)
PLATELET # BLD AUTO: 113 K/UL — LOW (ref 150–400)
RBC # BLD: 1.91 M/UL — LOW (ref 4.2–5.8)
RBC # FLD: 25.5 % — HIGH (ref 10.3–14.5)
WBC # BLD: 14.4 K/UL — HIGH (ref 3.8–10.5)
WBC # FLD AUTO: 14.4 K/UL — HIGH (ref 3.8–10.5)

## 2019-05-24 PROCEDURE — 99233 SBSQ HOSP IP/OBS HIGH 50: CPT

## 2019-05-24 PROCEDURE — 99232 SBSQ HOSP IP/OBS MODERATE 35: CPT

## 2019-05-24 RX ORDER — ACETAMINOPHEN 500 MG
650 TABLET ORAL EVERY 6 HOURS
Refills: 0 | Status: DISCONTINUED | OUTPATIENT
Start: 2019-05-24 | End: 2019-05-31

## 2019-05-24 RX ORDER — LINEZOLID 600 MG/300ML
600 INJECTION, SOLUTION INTRAVENOUS EVERY 12 HOURS
Refills: 0 | Status: DISCONTINUED | OUTPATIENT
Start: 2019-05-24 | End: 2019-05-24

## 2019-05-24 RX ORDER — ACETAMINOPHEN 500 MG
2 TABLET ORAL
Qty: 0 | Refills: 0 | DISCHARGE
Start: 2019-05-24

## 2019-05-24 RX ORDER — OXYCODONE AND ACETAMINOPHEN 5; 325 MG/1; MG/1
1 TABLET ORAL EVERY 6 HOURS
Refills: 0 | Status: DISCONTINUED | OUTPATIENT
Start: 2019-05-24 | End: 2019-05-28

## 2019-05-24 RX ADMIN — PIPERACILLIN AND TAZOBACTAM 25 GRAM(S): 4; .5 INJECTION, POWDER, LYOPHILIZED, FOR SOLUTION INTRAVENOUS at 13:01

## 2019-05-24 RX ADMIN — OXYCODONE AND ACETAMINOPHEN 1 TABLET(S): 5; 325 TABLET ORAL at 17:01

## 2019-05-24 RX ADMIN — Medication 1 MILLIGRAM(S): at 11:00

## 2019-05-24 RX ADMIN — Medication 20 MILLIGRAM(S): at 06:51

## 2019-05-24 RX ADMIN — LACTULOSE 10 GRAM(S): 10 SOLUTION ORAL at 06:49

## 2019-05-24 RX ADMIN — Medication 20 MILLIGRAM(S): at 22:08

## 2019-05-24 RX ADMIN — Medication 1 TABLET(S): at 11:00

## 2019-05-24 RX ADMIN — PIPERACILLIN AND TAZOBACTAM 25 GRAM(S): 4; .5 INJECTION, POWDER, LYOPHILIZED, FOR SOLUTION INTRAVENOUS at 06:49

## 2019-05-24 RX ADMIN — Medication 20 MILLIGRAM(S): at 13:01

## 2019-05-24 RX ADMIN — BUMETANIDE 1 MILLIGRAM(S): 0.25 INJECTION INTRAMUSCULAR; INTRAVENOUS at 06:50

## 2019-05-24 RX ADMIN — Medication 250 MILLIGRAM(S): at 08:57

## 2019-05-24 RX ADMIN — OXYCODONE AND ACETAMINOPHEN 1 TABLET(S): 5; 325 TABLET ORAL at 16:01

## 2019-05-24 RX ADMIN — LACTULOSE 10 GRAM(S): 10 SOLUTION ORAL at 17:02

## 2019-05-24 RX ADMIN — BUMETANIDE 1 MILLIGRAM(S): 0.25 INJECTION INTRAMUSCULAR; INTRAVENOUS at 17:02

## 2019-05-24 RX ADMIN — PANTOPRAZOLE SODIUM 40 MILLIGRAM(S): 20 TABLET, DELAYED RELEASE ORAL at 07:41

## 2019-05-24 RX ADMIN — CHLORHEXIDINE GLUCONATE 1 APPLICATION(S): 213 SOLUTION TOPICAL at 06:50

## 2019-05-24 NOTE — PROVIDER CONTACT NOTE (CRITICAL VALUE NOTIFICATION) - SITUATION
Hemoglobin 6.3/  [id number]
H/H 6.2/18.7
Received call from lab with results Hgb 6.6  and Hct 19.5
Pt admitted with low H/H--2.9/10.1

## 2019-05-24 NOTE — PROGRESS NOTE ADULT - ASSESSMENT
55 yo AAM  with h/o HTN and ETOH abuse brought in by wife for generalized weakness and dark urine; pt  found to have lactic acidosis (8.8), possible RICHAR with Hb 2.9 and repeat 2.4. In the ER pt was hemodynamically stable    Patient improving. Some recovery of H/H following a total of 9 UNITS of PRBC  this admission.  Hematologic derangement's are most likely multifactorial: Acute blood loss anemia into left leg Hematoma, Active hemalosis, Poor clotting factor synthesis secondary to cirrhosis of the liver   Appreciate Hematology, Vascular, Renal, GI, ID consults as this is a multi desplinary case        awaiting placement to rehab

## 2019-05-24 NOTE — PROGRESS NOTE ADULT - PROBLEM SELECTOR PLAN 1
resolving   leukocytosis sec to above vs on steroids as well  changing to Zyvox for five days  slowly resolving   no diarrhea

## 2019-05-24 NOTE — PROGRESS NOTE ADULT - SUBJECTIVE AND OBJECTIVE BOX
Patient is a 54y old  Male who presents with a chief complaint of anemia/ (24 May 2019 12:10)      INTERVAL HPI / OVERNIGHT EVENTS: doing ok     MEDICATIONS  (STANDING):  buMETAnide 1 milliGRAM(s) Oral every 12 hours  chlorhexidine 2% Cloths 1 Application(s) Topical daily  chlorhexidine 4% Liquid 1 Application(s) Topical <User Schedule>  folic acid 1 milliGRAM(s) Oral daily  lactulose Syrup 10 Gram(s) Oral two times a day  multivitamin 1 Tablet(s) Oral daily  pantoprazole    Tablet 40 milliGRAM(s) Oral before breakfast  predniSONE   Tablet 20 milliGRAM(s) Oral three times a day    MEDICATIONS  (PRN):  acetaminophen   Tablet .. 650 milliGRAM(s) Oral every 6 hours PRN Temp greater or equal to 38C (100.4F), Mild Pain (1 - 3)  oxyCODONE    5 mG/acetaminophen 325 mG 1 Tablet(s) Oral every 6 hours PRN Moderate Pain (4 - 6)  polyethylene glycol 3350 17 Gram(s) Oral daily PRN Constipation      Vital Signs Last 24 Hrs  T(C): 36.6 (24 May 2019 11:30), Max: 36.6 (24 May 2019 11:30)  T(F): 97.8 (24 May 2019 11:30), Max: 97.8 (24 May 2019 11:30)  HR: 82 (24 May 2019 11:30) (75 - 82)  BP: 110/63 (24 May 2019 11:30) (110/63 - 119/61)  BP(mean): --  RR: 17 (24 May 2019 11:30) (17 - 18)  SpO2: 100% (24 May 2019 11:30) (98% - 100%)    Review of systems:  General : no fever /chills,fatigue  CVS : no chest pain, palpitations  Lungs : no shortness of breath, cough  GI : no abdominal pain,vomiting, diarrhea   : no dysuria,hematuria        PHYSICAL EXAM:  General :NAD  Constitutional:  well-groomed, well-developed  Respiratory: CTAB/L  Cardiovascular: S1 and S2, RRR, no M/G/R  Gastrointestinal: BS+, soft, NT/ND  Extremities: No peripheral edema  Vascular: 2+ peripheral pulses  Skin: leg swelling + but redness imprved      LABS:                        7.6    13.29 )-----------( 143      ( 23 May 2019 08:46 )             23.7     05-23    138  |  101  |  30<H>  ----------------------------<  133<H>  3.2<L>   |  29  |  1.20    Ca    8.3<L>      23 May 2019 08:46    TPro  7.3  /  Alb  1.9<L>  /  TBili  6.0<H>  /  DBili  x   /  AST  80<H>  /  ALT  27  /  AlkPhos  137<H>  05-23          MICROBIOLOGY:  RECENT CULTURES:  05-17 .Body Fluid XXXX   polymorphonuclear leukocytes seen per low power field  No organisms seen  by cytocentrifuge   No growth at 5 days          RADIOLOGY & ADDITIONAL STUDIES:

## 2019-05-24 NOTE — PROVIDER CONTACT NOTE (CRITICAL VALUE NOTIFICATION) - RECOMMENDATIONS
No interventions at this time.
Transfuse PRBC
Transfuse 1 unit PRBC
No Transfusion Ordered at this time, will continue to Monitor

## 2019-05-24 NOTE — PROVIDER CONTACT NOTE (CRITICAL VALUE NOTIFICATION) - BACKGROUND
pt admitted for Anemia
H/O anemia and Acute renal Failure
Patient admitted with HTN and ETOH
Pt transfused with 2 units PRBC during the shift.

## 2019-05-24 NOTE — PROGRESS NOTE ADULT - SUBJECTIVE AND OBJECTIVE BOX
Patient states feeling better      Vital Signs Last 24 Hrs  T(C): 36.4 (24 May 2019 05:10), Max: 36.4 (23 May 2019 17:05)  T(F): 97.5 (24 May 2019 05:10), Max: 97.6 (23 May 2019 17:05)  HR: 81 (24 May 2019 05:10) (68 - 81)  BP: 118/61 (24 May 2019 05:10) (114/59 - 119/61)  BP(mean): --  RR: 17 (24 May 2019 05:10) (17 - 18)  SpO2: 98% (24 May 2019 05:10) (98% - 100%)    PHYSICAL EXAM:    general - AAO x 3  HEENT - Icterus +  CVS - RRR  RS - AE B/L  Abd - soft, NT  Ext - Pulses +        LABS:                        7.6    13.29 )-----------( 143      ( 23 May 2019 08:46 )             23.7     05-23    138  |  101  |  30<H>  ----------------------------<  133<H>  3.2<L>   |  29  |  1.20    Ca    8.3<L>      23 May 2019 08:46    TPro  7.3  /  Alb  1.9<L>  /  TBili  6.0<H>  /  DBili  x   /  AST  80<H>  /  ALT  27  /  AlkPhos  137<H>  05-23          Culture - Body Fluid with Gram Stain (collected 17 May 2019 18:25)  Source: .Body Fluid  Gram Stain (22 May 2019 20:50):    polymorphonuclear leukocytes seen per low power field    No organisms seen    by cytocentrifuge  Final Report (22 May 2019 20:50):    No growth at 5 days        RADIOLOGY & ADDITIONAL STUDIES:

## 2019-05-24 NOTE — PROGRESS NOTE ADULT - SUBJECTIVE AND OBJECTIVE BOX
Patient is a 54y old  Male who presents with a chief complaint of anemia/ (24 May 2019 10:08)      INTERVAL HPI/OVERNIGHT EVENTS: no events     MEDICATIONS  (STANDING):  buMETAnide 1 milliGRAM(s) Oral every 12 hours  chlorhexidine 2% Cloths 1 Application(s) Topical daily  chlorhexidine 4% Liquid 1 Application(s) Topical <User Schedule>  folic acid 1 milliGRAM(s) Oral daily  lactulose Syrup 10 Gram(s) Oral two times a day  multivitamin 1 Tablet(s) Oral daily  pantoprazole    Tablet 40 milliGRAM(s) Oral before breakfast  piperacillin/tazobactam IVPB. 3.375 Gram(s) IV Intermittent every 8 hours  predniSONE   Tablet 20 milliGRAM(s) Oral three times a day  vancomycin  IVPB 1000 milliGRAM(s) IV Intermittent every 12 hours    MEDICATIONS  (PRN):  polyethylene glycol 3350 17 Gram(s) Oral daily PRN Constipation      Allergies    No Known Allergies    Intolerances         Vital Signs Last 24 Hrs  T(C): 36.4 (24 May 2019 05:10), Max: 36.4 (23 May 2019 17:05)  T(F): 97.5 (24 May 2019 05:10), Max: 97.6 (23 May 2019 17:05)  HR: 81 (24 May 2019 05:10) (75 - 81)  BP: 118/61 (24 May 2019 05:10) (114/59 - 119/61)  BP(mean): --  RR: 17 (24 May 2019 05:10) (17 - 18)  SpO2: 98% (24 May 2019 05:10) (98% - 99%)    PHYSICAL EXAM:  GENERAL: NAD, well-groomed, well-developed  HEAD:  Atraumatic, Normocephalic  EYES: EOMI, PERRLA, conjunctiva and sclera clear  ENMT: No tonsillar erythema, exudates, or enlargement; Moist mucous membranes, Good dentition, No lesions  NECK: Supple, No JVD, Normal thyroid  NERVOUS SYSTEM:  Alert & Oriented X3, Good concentration; Motor Strength 5/5 B/L upper and lower extremities; DTRs 2+ intact and symmetric  CHEST/LUNG: Clear to percussion bilaterally; No rales, rhonchi, wheezing, or rubs  HEART: Regular rate and rhythm; No murmurs, rubs, or gallops  ABDOMEN: Soft, Nontender, Nondistended; Bowel sounds present decreased ascitis   EXTREMITIES:  2+ Peripheral Pulses,  b/l edema   SKIN:  jaundice poor toenail care     LABS:                        7.6    13.29 )-----------( 143      ( 23 May 2019 08:46 )             23.7     05-23    138  |  101  |  30<H>  ----------------------------<  133<H>  3.2<L>   |  29  |  1.20    Ca    8.3<L>      23 May 2019 08:46    TPro  7.3  /  Alb  1.9<L>  /  TBili  6.0<H>  /  DBili  x   /  AST  80<H>  /  ALT  27  /  AlkPhos  137<H>  05-23        CAPILLARY BLOOD GLUCOSE          RADIOLOGY & ADDITIONAL TESTS:    Imaging Personally Reviewed:  [ X] YES  [ ] NO    Consultant(s) Notes Reviewed:  [ X] YES  [ ] NO    Care Discussed with Consultants/Other Providers [X ] YES  [ ] NO

## 2019-05-25 LAB
ALBUMIN SERPL ELPH-MCNC: 1.8 G/DL — LOW (ref 3.3–5)
ALP SERPL-CCNC: 151 U/L — HIGH (ref 40–120)
ALT FLD-CCNC: 24 U/L — SIGNIFICANT CHANGE UP (ref 12–78)
ANION GAP SERPL CALC-SCNC: 11 MMOL/L — SIGNIFICANT CHANGE UP (ref 5–17)
AST SERPL-CCNC: 76 U/L — HIGH (ref 15–37)
BASOPHILS # BLD AUTO: 0.01 K/UL — SIGNIFICANT CHANGE UP (ref 0–0.2)
BASOPHILS NFR BLD AUTO: 0.1 % — SIGNIFICANT CHANGE UP (ref 0–2)
BILIRUB DIRECT SERPL-MCNC: 3.06 MG/DL — HIGH (ref 0.05–0.2)
BILIRUB INDIRECT FLD-MCNC: 3.1 MG/DL — HIGH (ref 0.2–1)
BILIRUB SERPL-MCNC: 6.2 MG/DL — HIGH (ref 0.2–1.2)
BUN SERPL-MCNC: 32 MG/DL — HIGH (ref 7–23)
CALCIUM SERPL-MCNC: 7.9 MG/DL — LOW (ref 8.5–10.1)
CHLORIDE SERPL-SCNC: 103 MMOL/L — SIGNIFICANT CHANGE UP (ref 96–108)
CO2 SERPL-SCNC: 26 MMOL/L — SIGNIFICANT CHANGE UP (ref 22–31)
CREAT SERPL-MCNC: 1.21 MG/DL — SIGNIFICANT CHANGE UP (ref 0.5–1.3)
EOSINOPHIL # BLD AUTO: 0.01 K/UL — SIGNIFICANT CHANGE UP (ref 0–0.5)
EOSINOPHIL NFR BLD AUTO: 0.1 % — SIGNIFICANT CHANGE UP (ref 0–6)
FERRITIN SERPL-MCNC: 357 NG/ML — SIGNIFICANT CHANGE UP (ref 30–400)
GLUCOSE SERPL-MCNC: 157 MG/DL — HIGH (ref 70–99)
HCT VFR BLD CALC: 20.4 % — CRITICAL LOW (ref 39–50)
HCT VFR BLD CALC: 20.9 % — CRITICAL LOW (ref 39–50)
HGB BLD-MCNC: 6.8 G/DL — CRITICAL LOW (ref 13–17)
HGB BLD-MCNC: 7 G/DL — CRITICAL LOW (ref 13–17)
IMM GRANULOCYTES NFR BLD AUTO: 4 % — HIGH (ref 0–1.5)
LDH SERPL L TO P-CCNC: 533 U/L — HIGH (ref 50–242)
LYMPHOCYTES # BLD AUTO: 0.94 K/UL — LOW (ref 1–3.3)
LYMPHOCYTES # BLD AUTO: 8.3 % — LOW (ref 13–44)
MCHC RBC-ENTMCNC: 32.9 PG — SIGNIFICANT CHANGE UP (ref 27–34)
MCHC RBC-ENTMCNC: 33 PG — SIGNIFICANT CHANGE UP (ref 27–34)
MCHC RBC-ENTMCNC: 33.3 GM/DL — SIGNIFICANT CHANGE UP (ref 32–36)
MCHC RBC-ENTMCNC: 33.5 GM/DL — SIGNIFICANT CHANGE UP (ref 32–36)
MCV RBC AUTO: 98.1 FL — SIGNIFICANT CHANGE UP (ref 80–100)
MCV RBC AUTO: 99 FL — SIGNIFICANT CHANGE UP (ref 80–100)
MONOCYTES # BLD AUTO: 1.4 K/UL — HIGH (ref 0–0.9)
MONOCYTES NFR BLD AUTO: 12.3 % — SIGNIFICANT CHANGE UP (ref 2–14)
NEUTROPHILS # BLD AUTO: 8.56 K/UL — HIGH (ref 1.8–7.4)
NEUTROPHILS NFR BLD AUTO: 75.2 % — SIGNIFICANT CHANGE UP (ref 43–77)
NRBC # BLD: 0 /100 WBCS — SIGNIFICANT CHANGE UP (ref 0–0)
NRBC # BLD: 0 /100 WBCS — SIGNIFICANT CHANGE UP (ref 0–0)
PLATELET # BLD AUTO: 102 K/UL — LOW (ref 150–400)
PLATELET # BLD AUTO: 107 K/UL — LOW (ref 150–400)
POTASSIUM SERPL-MCNC: 3.7 MMOL/L — SIGNIFICANT CHANGE UP (ref 3.5–5.3)
POTASSIUM SERPL-SCNC: 3.7 MMOL/L — SIGNIFICANT CHANGE UP (ref 3.5–5.3)
PROT SERPL-MCNC: 6.7 GM/DL — SIGNIFICANT CHANGE UP (ref 6–8.3)
RBC # BLD: 2.06 M/UL — LOW (ref 4.2–5.8)
RBC # BLD: 2.06 M/UL — LOW (ref 4.2–5.8)
RBC # BLD: 2.13 M/UL — LOW (ref 4.2–5.8)
RBC # FLD: 24.9 % — HIGH (ref 10.3–14.5)
RBC # FLD: 25.1 % — HIGH (ref 10.3–14.5)
RETICS #: 101.8 K/UL — SIGNIFICANT CHANGE UP (ref 25–125)
RETICS/RBC NFR: 4.9 % — HIGH (ref 0.5–2.5)
SODIUM SERPL-SCNC: 140 MMOL/L — SIGNIFICANT CHANGE UP (ref 135–145)
TRIGL SERPL-MCNC: 55 MG/DL — SIGNIFICANT CHANGE UP (ref 10–149)
WBC # BLD: 11.38 K/UL — HIGH (ref 3.8–10.5)
WBC # BLD: 13.97 K/UL — HIGH (ref 3.8–10.5)
WBC # FLD AUTO: 11.38 K/UL — HIGH (ref 3.8–10.5)
WBC # FLD AUTO: 13.97 K/UL — HIGH (ref 3.8–10.5)

## 2019-05-25 PROCEDURE — 99233 SBSQ HOSP IP/OBS HIGH 50: CPT

## 2019-05-25 PROCEDURE — 74177 CT ABD & PELVIS W/CONTRAST: CPT | Mod: 26

## 2019-05-25 RX ORDER — LINEZOLID 600 MG/300ML
600 INJECTION, SOLUTION INTRAVENOUS EVERY 12 HOURS
Refills: 0 | Status: COMPLETED | OUTPATIENT
Start: 2019-05-25 | End: 2019-05-30

## 2019-05-25 RX ADMIN — Medication 1 MILLIGRAM(S): at 11:44

## 2019-05-25 RX ADMIN — Medication 20 MILLIGRAM(S): at 21:53

## 2019-05-25 RX ADMIN — LACTULOSE 10 GRAM(S): 10 SOLUTION ORAL at 17:10

## 2019-05-25 RX ADMIN — LACTULOSE 10 GRAM(S): 10 SOLUTION ORAL at 06:23

## 2019-05-25 RX ADMIN — Medication 1 TABLET(S): at 11:44

## 2019-05-25 RX ADMIN — PANTOPRAZOLE SODIUM 40 MILLIGRAM(S): 20 TABLET, DELAYED RELEASE ORAL at 08:30

## 2019-05-25 RX ADMIN — Medication 20 MILLIGRAM(S): at 06:23

## 2019-05-25 RX ADMIN — OXYCODONE AND ACETAMINOPHEN 1 TABLET(S): 5; 325 TABLET ORAL at 08:30

## 2019-05-25 RX ADMIN — LINEZOLID 600 MILLIGRAM(S): 600 INJECTION, SOLUTION INTRAVENOUS at 17:10

## 2019-05-25 RX ADMIN — BUMETANIDE 1 MILLIGRAM(S): 0.25 INJECTION INTRAMUSCULAR; INTRAVENOUS at 06:23

## 2019-05-25 RX ADMIN — BUMETANIDE 1 MILLIGRAM(S): 0.25 INJECTION INTRAMUSCULAR; INTRAVENOUS at 17:10

## 2019-05-25 RX ADMIN — Medication 20 MILLIGRAM(S): at 13:48

## 2019-05-25 RX ADMIN — CHLORHEXIDINE GLUCONATE 1 APPLICATION(S): 213 SOLUTION TOPICAL at 06:24

## 2019-05-25 RX ADMIN — OXYCODONE AND ACETAMINOPHEN 1 TABLET(S): 5; 325 TABLET ORAL at 09:30

## 2019-05-25 NOTE — PROGRESS NOTE ADULT - SUBJECTIVE AND OBJECTIVE BOX
Patient is a 54y old  Male who presents with a chief complaint of anemia/ (25 May 2019 13:48)      INTERVAL HPI/OVERNIGHT EVENTS: no events     MEDICATIONS  (STANDING):  buMETAnide 1 milliGRAM(s) Oral every 12 hours  chlorhexidine 2% Cloths 1 Application(s) Topical daily  chlorhexidine 4% Liquid 1 Application(s) Topical <User Schedule>  folic acid 1 milliGRAM(s) Oral daily  lactulose Syrup 10 Gram(s) Oral two times a day  linezolid    Tablet 600 milliGRAM(s) Oral every 12 hours  multivitamin 1 Tablet(s) Oral daily  pantoprazole    Tablet 40 milliGRAM(s) Oral before breakfast  predniSONE   Tablet 20 milliGRAM(s) Oral three times a day    MEDICATIONS  (PRN):  acetaminophen   Tablet .. 650 milliGRAM(s) Oral every 6 hours PRN Temp greater or equal to 38C (100.4F), Mild Pain (1 - 3)  oxyCODONE    5 mG/acetaminophen 325 mG 1 Tablet(s) Oral every 6 hours PRN Moderate Pain (4 - 6)  polyethylene glycol 3350 17 Gram(s) Oral daily PRN Constipation      Allergies    No Known Allergies    Intolerances             Vital Signs Last 24 Hrs  T(C): 36 (25 May 2019 12:00), Max: 37.2 (24 May 2019 17:51)  T(F): 96.8 (25 May 2019 12:00), Max: 99 (24 May 2019 17:51)  HR: 74 (25 May 2019 12:00) (68 - 98)  BP: 111/64 (25 May 2019 12:00) (109/48 - 125/57)  BP(mean): --  RR: 17 (25 May 2019 12:00) (16 - 18)  SpO2: 99% (25 May 2019 12:00) (98% - 100%)    PHYSICAL EXAM:  GENERAL: NAD, well-groomed, well-developed  HEAD:  Atraumatic, Normocephalic  EYES: EOMI, PERRLA, conjunctiva and sclera clear  ENMT: No tonsillar erythema, exudates, or enlargement; Moist mucous membranes, Good dentition, No lesions  NECK: Supple, No JVD, Normal thyroid  NERVOUS SYSTEM:  Alert & Oriented X3, Good concentration; Motor Strength 5/5 B/L upper and lower extremities; DTRs 2+ intact and symmetric  CHEST/LUNG: Clear to percussion bilaterally; No rales, rhonchi, wheezing, or rubs  HEART: Regular rate and rhythm; No murmurs, rubs, or gallops  ABDOMEN: Soft, Nontender, Nondistended; Bowel sounds present decreased ascitis Lumbar back hematoma   EXTREMITIES:  2+ Peripheral Pulses,  b/l edema   SKIN:  jaundice poor toenail care     LABS:                        6.8    11.38 )-----------( 107      ( 25 May 2019 07:18 )             20.4     05-25    140  |  103  |  32<H>  ----------------------------<  157<H>  3.7   |  26  |  1.21    Ca    7.9<L>      25 May 2019 11:51    TPro  6.7  /  Alb  1.8<L>  /  TBili  6.2<H>  /  DBili  3.06<H>  /  AST  76<H>  /  ALT  24  /  AlkPhos  151<H>  05-25        CAPILLARY BLOOD GLUCOSE          RADIOLOGY & ADDITIONAL TESTS:    Imaging Personally Reviewed:  [ X] YES  [ ] NO    Consultant(s) Notes Reviewed:  [ X] YES  [ ] NO    Care Discussed with Consultants/Other Providers [X ] YES  [ ] NO

## 2019-05-25 NOTE — PROGRESS NOTE ADULT - SUBJECTIVE AND OBJECTIVE BOX
Patient is a 54y old  Male who presents with a chief complaint of anemia/ (25 May 2019 12:21)      HPI:  54 year old M HTN and ETOH abuse brought in by wife for generalized weakness and dark urine.  Wife reports patient has been having non-bloody non-bilious vomiting last week which stopped his drinking on Friday 5/3.  Wife also notes he has been having easy bruising and L leg swelling over the same amount of time.  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  Wife subsequently added that he has been having weakness and fatigue since early March with episodes of gum bleeding going back to early January.  Of note, wife noted that his skin color has changed over the past week.  Pt denies fevers, chills, eye pain vision changes, (08 May 2019 23:01)      INTERVAL HPI/OVERNIGHT EVENTS:  The patient denies melena, hematochezia, hematemesis, nausea, vomiting, abdominal pain, constipation, diarrhea, or change in bowel movements Slightly lethargic    MEDICATIONS  (STANDING):  buMETAnide 1 milliGRAM(s) Oral every 12 hours  chlorhexidine 2% Cloths 1 Application(s) Topical daily  chlorhexidine 4% Liquid 1 Application(s) Topical <User Schedule>  folic acid 1 milliGRAM(s) Oral daily  lactulose Syrup 10 Gram(s) Oral two times a day  linezolid    Tablet 600 milliGRAM(s) Oral every 12 hours  multivitamin 1 Tablet(s) Oral daily  pantoprazole    Tablet 40 milliGRAM(s) Oral before breakfast  predniSONE   Tablet 20 milliGRAM(s) Oral three times a day    MEDICATIONS  (PRN):  acetaminophen   Tablet .. 650 milliGRAM(s) Oral every 6 hours PRN Temp greater or equal to 38C (100.4F), Mild Pain (1 - 3)  oxyCODONE    5 mG/acetaminophen 325 mG 1 Tablet(s) Oral every 6 hours PRN Moderate Pain (4 - 6)  polyethylene glycol 3350 17 Gram(s) Oral daily PRN Constipation      FAMILY HISTORY:      Allergies    No Known Allergies    Intolerances        PMH/PSH:  Benign essential HTN        REVIEW OF SYSTEMS:  CONSTITUTIONAL: No fever, weight loss, or fatigue  EYES: No eye pain, visual disturbances, or discharge  ENMT:  No difficulty hearing, tinnitus, vertigo; No sinus or throat pain  NECK: No pain or stiffness  BREASTS: No pain, masses, or nipple discharge  RESPIRATORY: No cough, wheezing, chills or hemoptysis; No shortness of breath  CARDIOVASCULAR: No chest pain, palpitations, dizziness, or leg swelling  GASTROINTESTINAL:  see above  GENITOURINARY: No dysuria, frequency, hematuria, or incontinence  NEUROLOGICAL: No headaches, memory loss, loss of strength, numbness, or tremors  SKIN: No itching, burning, rashes, or lesions   LYMPH NODES: No enlarged glands  ENDOCRINE: No heat or cold intolerance; No hair loss  MUSCULOSKELETAL: No joint pain or swelling; No muscle, back, or extremity pain  PSYCHIATRIC: No depression, anxiety, mood swings, or difficulty sleeping  HEME/LYMPH: No easy bruising, or bleeding gums  ALLERGY AND IMMUNOLOGIC: No hives or eczema    Vital Signs Last 24 Hrs  T(C): 36 (25 May 2019 12:00), Max: 37.2 (24 May 2019 17:51)  T(F): 96.8 (25 May 2019 12:00), Max: 99 (24 May 2019 17:51)  HR: 74 (25 May 2019 12:00) (68 - 98)  BP: 111/64 (25 May 2019 12:00) (109/48 - 125/57)  BP(mean): --  RR: 17 (25 May 2019 12:00) (16 - 18)  SpO2: 99% (25 May 2019 12:00) (98% - 100%)    PHYSICAL EXAM:  GENERAL: NAD, well-groomed, well-developed  HEAD:  Atraumatic, Normocephalic  EYES: EOMI, PERRLA, conjunctiva and sclera clear  NECK: Supple, No JVD, Normal thyroid  NERVOUS SYSTEM:  Alert & but slightly lethargic Fair concentration; No asterixis  CHEST/LUNG: Clear to percussion bilaterally; No rales, rhonchi, wheezing, or rubs  HEART: Regular rate and rhythm; No murmurs, rubs, or gallops  ABDOMEN: Soft, Nontender, Nondistended; Bowel sounds present  EXTREMITIES:  2+ Peripheral Pulses, No clubbing, cyanosis, or edema  LYMPH: No lymphadenopathy noted  SKIN: No rashes or lesions    LAB                          6.8    11.38 )-----------( 107      ( 25 May 2019 07:18 )             20.4       CBC:  05-25 @ 07:18  WBC 11.38   Hgb 6.8   Hct 20.4   Plts 107  MCV 99.0  05-24 @ 16:22  WBC 14.40   Hgb 6.3   Hct 19.2   Plts 113  .5  05-23 @ 08:46  WBC 13.29   Hgb 7.6   Hct 23.7   Plts 143  MCV 98.3  05-22 @ 06:22  WBC 10.97   Hgb 7.3   Hct 23.1   Plts 153  MCV 98.3  05-21 @ 07:32  WBC 11.97   Hgb 8.8   Hct 27.8   Plts 186  MCV 99.3  05-20 @ 05:46  WBC 9.18   Hgb 8.6   Hct 26.7   Plts 198  MCV 99.3  05-19 @ 06:41  WBC 9.61   Hgb 7.9   Hct 24.7   Plts 202  MCV 99.2      Chemistry:  05-25 @ 11:51  Na+ 140  K+ 3.7  Cl- 103  CO2 26  BUN 32  Cr 1.21     05-23 @ 08:46  Na+ 138  K+ 3.2  Cl- 101  CO2 29  BUN 30  Cr 1.20     05-22 @ 06:22  Na+ 139  K+ 3.5  Cl- 104  CO2 28  BUN 30  Cr 1.19     05-21 @ 07:32  Na+ 142  K+ 3.9  Cl- 107  CO2 26  BUN 32  Cr 1.41     05-20 @ 05:46  Na+ 142  K+ 3.8  Cl- 107  CO2 27  BUN 35  Cr 1.26     05-19 @ 06:41  Na+ 144  K+ 3.8  Cl- 110  CO2 23  BUN 37  Cr 1.38         Glucose, Serum: 157 mg/dL (05-25 @ 11:51)  Glucose, Serum: 133 mg/dL (05-23 @ 08:46)  Glucose, Serum: 140 mg/dL (05-22 @ 06:22)  Glucose, Serum: 118 mg/dL (05-21 @ 07:32)  Glucose, Serum: 130 mg/dL (05-20 @ 05:46)  Glucose, Serum: 126 mg/dL (05-19 @ 06:41)      25 May 2019 11:51    140    |  103    |  32     ----------------------------<  157    3.7     |  26     |  1.21   23 May 2019 08:46    138    |  101    |  30     ----------------------------<  133    3.2     |  29     |  1.20   22 May 2019 06:22    139    |  104    |  30     ----------------------------<  140    3.5     |  28     |  1.19   21 May 2019 07:32    142    |  107    |  32     ----------------------------<  118    3.9     |  26     |  1.41   20 May 2019 05:46    142    |  107    |  35     ----------------------------<  130    3.8     |  27     |  1.26   19 May 2019 06:41    144    |  110    |  37     ----------------------------<  126    3.8     |  23     |  1.38     Ca    7.9        25 May 2019 11:51  Ca    8.3        23 May 2019 08:46  Ca    8.5        22 May 2019 06:22  Ca    8.6        21 May 2019 07:32  Ca    8.7        20 May 2019 05:46  Ca    8.5        19 May 2019 06:41    TPro  6.7    /  Alb  1.8    /  TBili  6.2    /  DBili  3.06   /  AST  76     /  ALT  24     /  AlkPhos  151    25 May 2019 07:18  TPro  7.3    /  Alb  1.9    /  TBili  6.0    /  DBili  x      /  AST  80     /  ALT  27     /  AlkPhos  137    23 May 2019 08:46  TPro  6.9    /  Alb  1.8    /  TBili  5.7    /  DBili  x      /  AST  73     /  ALT  24     /  AlkPhos  144    22 May 2019 06:22  TPro  7.8    /  Alb  1.9    /  TBili  7.0    /  DBili  x      /  AST  93     /  ALT  25     /  AlkPhos  144    21 May 2019 07:32  TPro  7.6    /  Alb  1.8    /  TBili  6.5    /  DBili  3.60   /  AST  76     /  ALT  23     /  AlkPhos  123    20 May 2019 05:46              CAPILLARY BLOOD GLUCOSE              RADIOLOGY & ADDITIONAL TESTS:    Imaging Personally Reviewed:  [ ] YES  [ ] NO    Consultant(s) Notes Reviewed:  [ ] YES  [ ] NO    Care Discussed with Consultants/Other Providers [ ] YES  [ ] NO

## 2019-05-25 NOTE — PROVIDER CONTACT NOTE (CRITICAL VALUE NOTIFICATION) - NAME OF MD/NP/PA/DO NOTIFIED:
Dr. Garcia
Eloise Valdovinos, PA
JEANNETTE Navas
stephenson
JEANNETTE Barbosa
Dr. Milan Hook
DR Garcia
Phylicia Sanchez

## 2019-05-25 NOTE — PROGRESS NOTE ADULT - ASSESSMENT
53 yo AAM  with h/o HTN and ETOH abuse brought in by wife for generalized weakness and dark urine; pt  found to have lactic acidosis (8.8), possible RICHAR with Hb 2.9 and repeat 2.4. In the ER pt was hemodynamically stable    Patient improving. Some recovery of H/H following a total of 9 UNITS of PRBC  this admission.  Hematologic derangement's are most likely multifactorial: Acute blood loss anemia into left leg Hematoma, Active hemalosis, Poor clotting factor synthesis secondary to cirrhosis of the liver   Appreciate Hematology, Vascular, Renal, GI, ID consults as this is a multi desplinary case        Pt had fall last night, has lumbar hematoma, hgb dropped s/p 1un yest. CT scan reviewed no signs of internal bleeding. Continue to monitor

## 2019-05-25 NOTE — PROGRESS NOTE ADULT - PROBLEM SELECTOR PLAN 1
- patient with drop in Hb, possible secondary to fall and hematoma  - for Ct scan  - Patient with multiple issues including cirrhosis, cellulitis, Anemia possible hemolysis, suggest patient be transferred to [facility] for higher level of care, including transplant center

## 2019-05-25 NOTE — PROGRESS NOTE ADULT - SUBJECTIVE AND OBJECTIVE BOX
Events Noted	    Vital Signs Last 24 Hrs  T(C): 36.7 (25 May 2019 05:00), Max: 37.2 (24 May 2019 17:51)  T(F): 98 (25 May 2019 05:00), Max: 99 (24 May 2019 17:51)  HR: 70 (25 May 2019 05:00) (68 - 98)  BP: 125/57 (25 May 2019 05:00) (109/48 - 125/57)  BP(mean): --  RR: 17 (25 May 2019 05:00) (16 - 18)  SpO2: 100% (25 May 2019 05:00) (98% - 100%)    PHYSICAL EXAM:    general - AAO x 3  HEENT - Icterus +  CVS - RRR  RS - AE B/L  Abd - soft, NT  Ext - Pulses +        LABS:                        6.8    11.38 )-----------( 107      ( 25 May 2019 07:18 )             20.4     05-25    140  |  103  |  32<H>  ----------------------------<  157<H>  3.7   |  26  |  1.21    Ca    7.9<L>      25 May 2019 11:51    TPro  6.7  /  Alb  1.8<L>  /  TBili  6.2<H>  /  DBili  3.06<H>  /  AST  76<H>  /  ALT  24  /  AlkPhos  151<H>  05-25          Culture - Body Fluid with Gram Stain (collected 17 May 2019 18:25)  Source: .Body Fluid  Gram Stain (22 May 2019 20:50):    polymorphonuclear leukocytes seen per low power field    No organisms seen    by cytocentrifuge  Final Report (22 May 2019 20:50):    No growth at 5 days        RADIOLOGY & ADDITIONAL STUDIES:

## 2019-05-25 NOTE — CHART NOTE - NSCHARTNOTEFT_GEN_A_CORE
Assessment: Pt seen for food insecurity consult. Food [facility] Health Eligible: [facility] referral given to pt. Pt admitted with Acute renal failure, pitting edema , ETOH abuse, ETOH liver cirrhosis, DVT, HTN and acute blood loss anemia. Pt denied any N/V/D/C and continues with good appetite at meal times. Discussed with pt balanced diet and explained Food as Health program.     Factors impacting intake: [ x ] none [ ] nausea  [ ] vomiting [ ] diarrhea [ ] constipation  [ ]chewing problems [ ] swallowing issues  [ ] other:     Diet Prescription: Diet, Regular:   Supplement Feeding Modality:  Oral  DanActive Cans or Servings Per Day:  1       Frequency:  Two Times a day (05-10-19 @ 07:20)    Intake: 100%  Current Weight:  100.5kg (05-25); 106.5 ( 05-18)  % Weight Change: 6% gain; change in edema noted                    ---> 05-18: generalized edema 1+, Right foot, ankle, leg edema 2+, Left foot, ankle, leg edema 4+                    ---->05-25: generalized edema 1+    Physical appearance: BMI 29.0; generalized edema 1+    Pertinent Medications: MEDICATIONS  (STANDING):  buMETAnide 1 milliGRAM(s) Oral every 12 hours  chlorhexidine 2% Cloths 1 Application(s) Topical daily  chlorhexidine 4% Liquid 1 Application(s) Topical <User Schedule>  folic acid 1 milliGRAM(s) Oral daily  lactulose Syrup 10 Gram(s) Oral two times a day  multivitamin 1 Tablet(s) Oral daily  pantoprazole    Tablet 40 milliGRAM(s) Oral before breakfast  predniSONE   Tablet 20 milliGRAM(s) Oral three times a day    MEDICATIONS  (PRN):  acetaminophen   Tablet .. 650 milliGRAM(s) Oral every 6 hours PRN Temp greater or equal to 38C (100.4F), Mild Pain (1 - 3)  oxyCODONE    5 mG/acetaminophen 325 mG 1 Tablet(s) Oral every 6 hours PRN Moderate Pain (4 - 6)  polyethylene glycol 3350 17 Gram(s) Oral daily PRN Constipation    Pertinent Labs: 05-25 Na 140 mmol/L Glu 157 mg/dL<H> K+ 3.7 mmol/L Cr 1.21 mg/dL BUN 32 mg/dL<H> Phos n/a   Alb n/a   PAB n/a   Hgb n/a   Hct n/a   HgA1C n/a    Glucose, Serum: 157 mg/dL <H>   24Hr FS:  Skin: bruised, otherwise intact     Estimated Needs:   [ x ] no change since previous assessment (  [ ] recalculated:     Previous Nutrition Diagnosis: No nutrition related dx       Interventions:   Recommend  [ x ] Continue: Regular diet with Juvenal Active BID   [ ] Change Diet To:  [ ] Nutrition Supplement:  [ ] Nutrition Support:  [ ] Other:     Monitoring and Evaluation:   [x ] PO intake [ x ] Tolerance to diet prescription [ x ] weights [ x ] labs[ x ] follow up per protocol  [ ] other:

## 2019-05-25 NOTE — PROVIDER CONTACT NOTE (CRITICAL VALUE NOTIFICATION) - ACTION/TREATMENT ORDERED:
Continue current treatment.
aware
2 units needed
No orders at this time
Transfuse 1 unit PRBC
To be seen by Hem/Onc and Awaiting Recommendation

## 2019-05-25 NOTE — PROGRESS NOTE ADULT - PROBLEM SELECTOR PLAN 2
8.4  ( 4.68 ) / 81 / 17 /  127 .==> 10.1 / 11.2 / 21 / 108  ==> 9.2 / 84 / 17 / 89  ==> 6.5 / 76 / 23 / 123  ==> 7.0 / 93 / 25 / 144 ==> 5.7  / 73 / 24 / 144  ==> 6.0 / 80 / 27 / 137 ==> 6.2 / 76 / 24 / 151 ,    elevated LFTS probably secondary to , meds ( eg Librium, antibiotics   ) and underlying cirrhosis . Viral studies negative. CT scan / Sono [id number] essentially negative.  On lactulose. 8.4  ( 4.68 ) / 81 / 17 /  127 .==> 10.1 / 11.2 / 21 / 108  ==> 9.2 / 84 / 17 / 89  ==> 6.5 / 76 / 23 / 123  ==> 7.0 / 93 / 25 / 144 ==> 5.7  / 73 / 24 / 144  ==> 6.0 / 80 / 27 / 137 ==> 6.2 / 76 / 24 / 151 ,    elevated LFTS probably secondary to , meds ( eg Librium, antibiotics   ) and underlying cirrhosis . Viral studies negative. CT scan / Sono [id number] essentially negative.  On lactulose. ASMA 1:80 AMA < 1:20  YARED negative  Viral studies negative. Patient's LFTs the same as on admission which is probably secondary to severe cirrhosis. Acute rise in hospital probably related to hemolysis and acute metabolic event / medications. Will need EGd / liver biopsy as out patient. 8.4  ( 4.68 ) / 81 / 17 /  127 .==> 10.1 / 11.2 / 21 / 108  ==> 9.2 / 84 / 17 / 89  ==> 6.5 / 76 / 23 / 123  ==> 7.0 / 93 / 25 / 144 ==> 5.7  / 73 / 24 / 144  ==> 6.0 / 80 / 27 / 137 ==> 6.2 / 76 / 24 / 151 ,    elevated LFTS probably secondary to , meds ( eg Librium, antibiotics   ) and underlying cirrhosis . Viral studies negative. CT scan / Sono [id number] essentially negative.  On lactulose. ASMA 1:80 AMA < 1:20  YARED negative  Fe/TIBC/ Ferritin 62 / 148 / 129 ( essentially negative )  Viral studies negative. Patient's LFTs the same as on admission which is probably secondary to severe cirrhosis. Acute rise in hospital probably related to hemolysis and acute metabolic event / medications. Will need EGD / liver biopsy as out patient.

## 2019-05-26 LAB
ALBUMIN SERPL ELPH-MCNC: 1.8 G/DL — LOW (ref 3.3–5)
ALP SERPL-CCNC: 194 U/L — HIGH (ref 40–120)
ALT FLD-CCNC: 26 U/L — SIGNIFICANT CHANGE UP (ref 12–78)
ANISOCYTOSIS BLD QL: SIGNIFICANT CHANGE UP
AST SERPL-CCNC: 75 U/L — HIGH (ref 15–37)
BASOPHILS # BLD AUTO: 0 K/UL — SIGNIFICANT CHANGE UP (ref 0–0.2)
BASOPHILS NFR BLD AUTO: 0 % — SIGNIFICANT CHANGE UP (ref 0–2)
BILIRUB DIRECT SERPL-MCNC: 3.05 MG/DL — HIGH (ref 0.05–0.2)
BILIRUB INDIRECT FLD-MCNC: 2.6 MG/DL — HIGH (ref 0.2–1)
BILIRUB SERPL-MCNC: 5.7 MG/DL — HIGH (ref 0.2–1.2)
BURR CELLS BLD QL SMEAR: PRESENT — SIGNIFICANT CHANGE UP
BURR CELLS BLD QL SMEAR: SLIGHT — SIGNIFICANT CHANGE UP
DIR ANTIGLOB POLYSPECIFIC INTERPRETATION: SIGNIFICANT CHANGE UP
EOSINOPHIL # BLD AUTO: 0 K/UL — SIGNIFICANT CHANGE UP (ref 0–0.5)
EOSINOPHIL NFR BLD AUTO: 0 % — SIGNIFICANT CHANGE UP (ref 0–6)
HCT VFR BLD CALC: 19.2 % — CRITICAL LOW (ref 39–50)
HGB BLD-MCNC: 6.5 G/DL — CRITICAL LOW (ref 13–17)
HYPOCHROMIA BLD QL: SLIGHT — SIGNIFICANT CHANGE UP
LDH SERPL L TO P-CCNC: 504 U/L — HIGH (ref 50–242)
LYMPHOCYTES # BLD AUTO: 0.98 K/UL — LOW (ref 1–3.3)
LYMPHOCYTES # BLD AUTO: 8 % — LOW (ref 13–44)
MACROCYTES BLD QL: SLIGHT — SIGNIFICANT CHANGE UP
MANUAL SMEAR VERIFICATION: SIGNIFICANT CHANGE UP
MCHC RBC-ENTMCNC: 33.9 GM/DL — SIGNIFICANT CHANGE UP (ref 32–36)
MCHC RBC-ENTMCNC: 33.9 PG — SIGNIFICANT CHANGE UP (ref 27–34)
MCV RBC AUTO: 100 FL — SIGNIFICANT CHANGE UP (ref 80–100)
METAMYELOCYTES # FLD: 1 % — HIGH (ref 0–0)
MONOCYTES # BLD AUTO: 1.35 K/UL — HIGH (ref 0–0.9)
MONOCYTES NFR BLD AUTO: 11 % — SIGNIFICANT CHANGE UP (ref 2–14)
NEUTROPHILS # BLD AUTO: 9.84 K/UL — HIGH (ref 1.8–7.4)
NEUTROPHILS NFR BLD AUTO: 79 % — HIGH (ref 43–77)
NEUTS BAND # BLD: 1 % — SIGNIFICANT CHANGE UP (ref 0–8)
NRBC # BLD: 0 /100 — SIGNIFICANT CHANGE UP (ref 0–0)
NRBC # BLD: SIGNIFICANT CHANGE UP /100 WBCS (ref 0–0)
OVALOCYTES BLD QL SMEAR: SLIGHT — SIGNIFICANT CHANGE UP
PLAT MORPH BLD: NORMAL — SIGNIFICANT CHANGE UP
PLATELET # BLD AUTO: 103 K/UL — LOW (ref 150–400)
PLATELET COUNT - ESTIMATE: ABNORMAL
POIKILOCYTOSIS BLD QL AUTO: SLIGHT — SIGNIFICANT CHANGE UP
POLYCHROMASIA BLD QL SMEAR: SLIGHT — SIGNIFICANT CHANGE UP
PROT SERPL-MCNC: 6.5 GM/DL — SIGNIFICANT CHANGE UP (ref 6–8.3)
RBC # BLD: 1.92 M/UL — LOW (ref 4.2–5.8)
RBC # BLD: 1.92 M/UL — LOW (ref 4.2–5.8)
RBC # FLD: 25.5 % — HIGH (ref 10.3–14.5)
RBC BLD AUTO: ABNORMAL
RETICS #: 110.8 K/UL — SIGNIFICANT CHANGE UP (ref 25–125)
RETICS/RBC NFR: 5.8 % — HIGH (ref 0.5–2.5)
SCHISTOCYTES BLD QL AUTO: SLIGHT — SIGNIFICANT CHANGE UP
TARGETS BLD QL SMEAR: SLIGHT — SIGNIFICANT CHANGE UP
WBC # BLD: 12.3 K/UL — HIGH (ref 3.8–10.5)
WBC # FLD AUTO: 12.3 K/UL — HIGH (ref 3.8–10.5)

## 2019-05-26 PROCEDURE — 99233 SBSQ HOSP IP/OBS HIGH 50: CPT

## 2019-05-26 RX ORDER — LINEZOLID 600 MG/300ML
1 INJECTION, SOLUTION INTRAVENOUS
Qty: 0 | Refills: 0 | DISCHARGE
Start: 2019-05-26

## 2019-05-26 RX ADMIN — LACTULOSE 10 GRAM(S): 10 SOLUTION ORAL at 17:19

## 2019-05-26 RX ADMIN — Medication 20 MILLIGRAM(S): at 14:10

## 2019-05-26 RX ADMIN — LINEZOLID 600 MILLIGRAM(S): 600 INJECTION, SOLUTION INTRAVENOUS at 05:13

## 2019-05-26 RX ADMIN — Medication 1 TABLET(S): at 11:29

## 2019-05-26 RX ADMIN — Medication 20 MILLIGRAM(S): at 22:26

## 2019-05-26 RX ADMIN — LINEZOLID 600 MILLIGRAM(S): 600 INJECTION, SOLUTION INTRAVENOUS at 17:20

## 2019-05-26 RX ADMIN — OXYCODONE AND ACETAMINOPHEN 1 TABLET(S): 5; 325 TABLET ORAL at 05:22

## 2019-05-26 RX ADMIN — Medication 1 MILLIGRAM(S): at 11:29

## 2019-05-26 RX ADMIN — OXYCODONE AND ACETAMINOPHEN 1 TABLET(S): 5; 325 TABLET ORAL at 04:22

## 2019-05-26 RX ADMIN — LACTULOSE 10 GRAM(S): 10 SOLUTION ORAL at 05:13

## 2019-05-26 RX ADMIN — Medication 20 MILLIGRAM(S): at 05:13

## 2019-05-26 RX ADMIN — BUMETANIDE 1 MILLIGRAM(S): 0.25 INJECTION INTRAMUSCULAR; INTRAVENOUS at 17:19

## 2019-05-26 RX ADMIN — CHLORHEXIDINE GLUCONATE 1 APPLICATION(S): 213 SOLUTION TOPICAL at 05:13

## 2019-05-26 RX ADMIN — BUMETANIDE 1 MILLIGRAM(S): 0.25 INJECTION INTRAMUSCULAR; INTRAVENOUS at 07:35

## 2019-05-26 NOTE — PROGRESS NOTE ADULT - PROBLEM SELECTOR PLAN 2
8.4  ( 4.68 ) / 81 / 17 /  127 .==> 10.1 / 11.2 / 21 / 108  ==> 9.2 / 84 / 17 / 89  ==> 6.5 / 76 / 23 / 123  ==> 7.0 / 93 / 25 / 144 ==> 5.7  / 73 / 24 / 144  ==> 6.0 / 80 / 27 / 137 ==> 6.2 / 76 / 24 / 151 ==> 5.7 / 75 / 26 / 194 ,    elevated LFTS probably secondary to , meds ( eg Librium, antibiotics   ) and underlying cirrhosis . Viral studies negative. CT scan / Sono [id number] essentially negative.  On lactulose. ASMA 1:80 AMA < 1:20  YARED negative  Fe/TIBC/ Ferritin 62 / 148 / 129 ( essentially negative )  Viral studies negative. Patient's LFTs the same as on admission which is probably secondary to severe cirrhosis. Acute rise in hospital probably related to hemolysis and acute metabolic event / medications. Will need EGD / liver biopsy as out patient.

## 2019-05-26 NOTE — PROGRESS NOTE ADULT - SUBJECTIVE AND OBJECTIVE BOX
Patient is a 54y old  Male who presents with a chief complaint of anemia/ (26 May 2019 11:46)      INTERVAL HPI/OVERNIGHT EVENTS: no events     MEDICATIONS  (STANDING):  buMETAnide 1 milliGRAM(s) Oral every 12 hours  chlorhexidine 2% Cloths 1 Application(s) Topical daily  chlorhexidine 4% Liquid 1 Application(s) Topical <User Schedule>  folic acid 1 milliGRAM(s) Oral daily  lactulose Syrup 10 Gram(s) Oral two times a day  linezolid    Tablet 600 milliGRAM(s) Oral every 12 hours  multivitamin 1 Tablet(s) Oral daily  pantoprazole    Tablet 40 milliGRAM(s) Oral before breakfast  predniSONE   Tablet 20 milliGRAM(s) Oral three times a day    MEDICATIONS  (PRN):  acetaminophen   Tablet .. 650 milliGRAM(s) Oral every 6 hours PRN Temp greater or equal to 38C (100.4F), Mild Pain (1 - 3)  oxyCODONE    5 mG/acetaminophen 325 mG 1 Tablet(s) Oral every 6 hours PRN Moderate Pain (4 - 6)  polyethylene glycol 3350 17 Gram(s) Oral daily PRN Constipation      Allergies    No Known Allergies    Intolerances       Vital Signs Last 24 Hrs  T(C): 37 (26 May 2019 12:13), Max: 37 (26 May 2019 12:13)  T(F): 98.6 (26 May 2019 12:13), Max: 98.6 (26 May 2019 12:13)  HR: 88 (26 May 2019 12:13) (82 - 93)  BP: 123/64 (26 May 2019 12:13) (115/65 - 143/75)  BP(mean): --  RR: 18 (26 May 2019 12:13) (16 - 18)  SpO2: 97% (26 May 2019 12:13) (97% - 99%)    PHYSICAL EXAM:  GENERAL: NAD, well-groomed, well-developed  HEAD:  Atraumatic, Normocephalic  EYES: EOMI, PERRLA, conjunctiva and sclera clear  ENMT: No tonsillar erythema, exudates, or enlargement; Moist mucous membranes, Good dentition, No lesions  NECK: Supple, No JVD, Normal thyroid  NERVOUS SYSTEM:  Alert & Oriented X3, Good concentration; Motor Strength 5/5 B/L upper and lower extremities; DTRs 2+ intact and symmetric  CHEST/LUNG: Clear to percussion bilaterally; No rales, rhonchi, wheezing, or rubs  HEART: Regular rate and rhythm; No murmurs, rubs, or gallops  ABDOMEN: Soft, Nontender, Nondistended; Bowel sounds present decreased ascitis Lumbar back hematoma   EXTREMITIES:  2+ Peripheral Pulses,  b/l edema   SKIN:  jaundice poor toenail care       LABS:                        6.5    12.30 )-----------( 103      ( 26 May 2019 07:34 )             19.2     05-25    140  |  103  |  32<H>  ----------------------------<  157<H>  3.7   |  26  |  1.21    Ca    7.9<L>      25 May 2019 11:51    TPro  6.5  /  Alb  1.8<L>  /  TBili  5.7<H>  /  DBili  3.05<H>  /  AST  75<H>  /  ALT  26  /  AlkPhos  194<H>  05-26        CAPILLARY BLOOD GLUCOSE          RADIOLOGY & ADDITIONAL TESTS:    Imaging Personally Reviewed:  [ X] YES  [ ] NO    Consultant(s) Notes Reviewed:  [ X] YES  [ ] NO    Care Discussed with Consultants/Other Providers [X ] YES  [ ] NO

## 2019-05-26 NOTE — PROGRESS NOTE ADULT - ASSESSMENT
53 yo AAM  with h/o HTN and ETOH abuse brought in by wife for generalized weakness and dark urine; pt  found to have lactic acidosis (8.8), possible RICHAR with Hb 2.9 and repeat 2.4. In the ER pt was hemodynamically stable    Patient improving. Some recovery of H/H following a total of 9 UNITS of PRBC  this admission.  Hematologic derangement's are most likely multifactorial: Acute blood loss anemia into left leg Hematoma, Active hemalosis, Poor clotting factor synthesis secondary to cirrhosis of the liver   Appreciate Hematology, Vascular, Renal, GI, ID consults as this is a multi desplinary case        Pt had fall 3 nights ago, has lumbar hematoma, hgb dropped  . CT scan reviewed no signs of internal bleeding. Continue to monitor      Transfuse another unit/platelets today  Discussed with [facility], pt accepted for transfer for further management

## 2019-05-26 NOTE — PROGRESS NOTE ADULT - SUBJECTIVE AND OBJECTIVE BOX
patient being transferred to [facility]    Vital Signs Last 24 Hrs  T(C): 36.7 (26 May 2019 04:27), Max: 36.7 (26 May 2019 00:01)  T(F): 98 (26 May 2019 04:27), Max: 98 (26 May 2019 00:01)  HR: 93 (26 May 2019 04:27) (74 - 93)  BP: 115/65 (26 May 2019 04:27) (111/64 - 143/75)  BP(mean): --  RR: 18 (26 May 2019 04:27) (16 - 18)  SpO2: 99% (26 May 2019 04:27) (97% - 99%)    PHYSICAL EXAM:    general - AAO x 3  HEENT - + Icterus  CVS - RRR  RS - AE B/L  Abd - soft, NT  Ext - Pulses +        LABS:                        6.5    12.30 )-----------( 103      ( 26 May 2019 07:34 )             19.2     05-25    140  |  103  |  32<H>  ----------------------------<  157<H>  3.7   |  26  |  1.21    Ca    7.9<L>      25 May 2019 11:51    TPro  6.5  /  Alb  1.8<L>  /  TBili  5.7<H>  /  DBili  3.05<H>  /  AST  75<H>  /  ALT  26  /  AlkPhos  194<H>  05-26          Culture - Body Fluid with Gram Stain (collected 17 May 2019 18:25)  Source: .Body Fluid  Gram Stain (22 May 2019 20:50):    polymorphonuclear leukocytes seen per low power field    No organisms seen    by cytocentrifuge  Final Report (22 May 2019 20:50):    No growth at 5 days        RADIOLOGY & ADDITIONAL STUDIES:

## 2019-05-26 NOTE — ACUTE INTERFACILITY TRANSFER NOTE - HOSPITAL COURSE
55 yo AAM  with h/o HTN and ETOH abuse brought in by wife for generalized weakness and dark urine; pt  found to have lactic acidosis (8.8), possible RICHAR with Hb 2.9 and repeat 2.4. In the ER pt was hemodynamically stable    Patient improving. Some recovery of H/H following a total of 13UNITS of PRBC  this admission.  Hematologic derangement's are most likely multifactorial: Acute blood loss anemia into left leg Hematoma, Active hemalosis, Poor clotting factor synthesis secondary to cirrhosis of the liver   Appreciate Hematology, Vascular, Renal, GI, ID consults as this is a multi desplinary case        Pt had fall 2 nights ago, has lumbar hematoma, hgb dropped again . CT scan reviewed no signs of internal bleeding. Continue to monitor          {[id number]} Problem/Plan - 1:  ·  Problem: Acute blood loss anemia.  Plan: into left leg hematoma s/p 13 units prbc transfer to [facility] for further work up     {[id number]} Problem/Plan - 2:  ·  Problem: Acquired hemolytic anemia.  Plan: unsure further tests pending  steroids   continue to follow cbc and ldh.     {[id number]} Problem/Plan - 3:  ·  Problem: Hematoma.  Plan: non expanding on CTA no further intervention per Ortho and Vascular.     {[id number]} Problem/Plan - 4:  ·  Problem: Cellulitis of left lower extremity.  Plan: to complete 3more days of zyvox po     {[id number]} Problem/Plan - 5:  ·  Problem: Ascites due to alcoholic cirrhosis.  Plan: paracentesis done no sbp.     {[id number]} Problem/Plan - 6:  Problem: Metabolic encephalopathy. Plan: resolved secondary to EtOH.    {[id number]} Problem/Plan - 7:  ·  Problem: RICHAR (acute kidney injury).  Plan: resolving appreciate nephrology consult.     {[id number]} Problem/Plan - 8:  ·  Problem: ETOH abuse.  Plan: continue CIWA   librium completed    {[id number]} Problem/Plan - 9:  ·  Problem: Benign essential HTN.  Plan: stable on bumex.     Attending Attestation:   55 minutes spent on total encounter;

## 2019-05-26 NOTE — PROGRESS NOTE ADULT - SUBJECTIVE AND OBJECTIVE BOX
Patient is a 54y old  Male who presents with a chief complaint of anemia/ (26 May 2019 14:34)      HPI:  54 year old M HTN and ETOH abuse brought in by wife for generalized weakness and dark urine.  Wife reports patient has been having non-bloody non-bilious vomiting last week which stopped his drinking on Friday 5/3.  Wife also notes he has been having easy bruising and L leg swelling over the same amount of time.  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  Wife subsequently added that he has been having weakness and fatigue since early March with episodes of gum bleeding going back to early January.  Of note, wife noted that his skin color has changed over the past week.  Pt denies fevers, chills, eye pain vision changes, (08 May 2019 23:01)      INTERVAL HPI/OVERNIGHT EVENTS:  The patient denies melena, hematochezia, hematemesis, nausea, vomiting, abdominal pain, constipation, diarrhea, or change in bowel movements Tolerating diet    MEDICATIONS  (STANDING):  buMETAnide 1 milliGRAM(s) Oral every 12 hours  chlorhexidine 2% Cloths 1 Application(s) Topical daily  chlorhexidine 4% Liquid 1 Application(s) Topical <User Schedule>  folic acid 1 milliGRAM(s) Oral daily  lactulose Syrup 10 Gram(s) Oral two times a day  linezolid    Tablet 600 milliGRAM(s) Oral every 12 hours  multivitamin 1 Tablet(s) Oral daily  pantoprazole    Tablet 40 milliGRAM(s) Oral before breakfast  predniSONE   Tablet 20 milliGRAM(s) Oral three times a day    MEDICATIONS  (PRN):  acetaminophen   Tablet .. 650 milliGRAM(s) Oral every 6 hours PRN Temp greater or equal to 38C (100.4F), Mild Pain (1 - 3)  oxyCODONE    5 mG/acetaminophen 325 mG 1 Tablet(s) Oral every 6 hours PRN Moderate Pain (4 - 6)  polyethylene glycol 3350 17 Gram(s) Oral daily PRN Constipation      FAMILY HISTORY:      Allergies    No Known Allergies    Intolerances        PMH/PSH:  Benign essential HTN        REVIEW OF SYSTEMS:  CONSTITUTIONAL: No fever, weight loss, or fatigue  RESPIRATORY: No cough, wheezing, chills or hemoptysis; No shortness of breath  CARDIOVASCULAR: No chest pain, palpitations, dizziness, or leg swelling  GASTROINTESTINAL: See above        Vital Signs Last 24 Hrs  T(C): 37 (26 May 2019 17:05), Max: 37 (26 May 2019 12:13)  T(F): 98.6 (26 May 2019 17:05), Max: 98.6 (26 May 2019 12:13)  HR: 86 (26 May 2019 17:05) (82 - 93)  BP: 141/71 (26 May 2019 17:05) (112/65 - 141/71)  BP(mean): --  RR: 18 (26 May 2019 17:05) (16 - 18)  SpO2: 100% (26 May 2019 17:05) (97% - 100%)    PHYSICAL EXAM:  GENERAL: NAD, well-groomed, well-developed  CHEST/LUNG: Clear to percussion bilaterally; No rales, rhonchi, wheezing, or rubs  HEART: Regular rate and rhythm; No murmurs, rubs, or gallops  ABDOMEN: Soft, Nontender, Nondistended; Bowel sounds present  SKIN: Icteric    LAB                          6.5    12.30 )-----------( 103      ( 26 May 2019 07:34 )             19.2       CBC:  05-26 @ 07:34  WBC 12.30   Hgb 6.5   Hct 19.2   Plts 103  .0  05-25 @ 21:58  WBC 13.97   Hgb 7.0   Hct 20.9   Plts 102  MCV 98.1  05-25 @ 07:18  WBC 11.38   Hgb 6.8   Hct 20.4   Plts 107  MCV 99.0  05-24 @ 16:22  WBC 14.40   Hgb 6.3   Hct 19.2   Plts 113  .5  05-23 @ 08:46  WBC 13.29   Hgb 7.6   Hct 23.7   Plts 143  MCV 98.3  05-22 @ 06:22  WBC 10.97   Hgb 7.3   Hct 23.1   Plts 153  MCV 98.3  05-21 @ 07:32  WBC 11.97   Hgb 8.8   Hct 27.8   Plts 186  MCV 99.3  05-20 @ 05:46  WBC 9.18   Hgb 8.6   Hct 26.7   Plts 198  MCV 99.3      Chemistry:  05-25 @ 11:51  Na+ 140  K+ 3.7  Cl- 103  CO2 26  BUN 32  Cr 1.21     05-23 @ 08:46  Na+ 138  K+ 3.2  Cl- 101  CO2 29  BUN 30  Cr 1.20     05-22 @ 06:22  Na+ 139  K+ 3.5  Cl- 104  CO2 28  BUN 30  Cr 1.19     05-21 @ 07:32  Na+ 142  K+ 3.9  Cl- 107  CO2 26  BUN 32  Cr 1.41     05-20 @ 05:46  Na+ 142  K+ 3.8  Cl- 107  CO2 27  BUN 35  Cr 1.26         Glucose, Serum: 157 mg/dL (05-25 @ 11:51)  Glucose, Serum: 133 mg/dL (05-23 @ 08:46)  Glucose, Serum: 140 mg/dL (05-22 @ 06:22)  Glucose, Serum: 118 mg/dL (05-21 @ 07:32)  Glucose, Serum: 130 mg/dL (05-20 @ 05:46)      25 May 2019 11:51    140    |  103    |  32     ----------------------------<  157    3.7     |  26     |  1.21   23 May 2019 08:46    138    |  101    |  30     ----------------------------<  133    3.2     |  29     |  1.20   22 May 2019 06:22    139    |  104    |  30     ----------------------------<  140    3.5     |  28     |  1.19   21 May 2019 07:32    142    |  107    |  32     ----------------------------<  118    3.9     |  26     |  1.41   20 May 2019 05:46    142    |  107    |  35     ----------------------------<  130    3.8     |  27     |  1.26     Ca    7.9        25 May 2019 11:51  Ca    8.3        23 May 2019 08:46  Ca    8.5        22 May 2019 06:22  Ca    8.6        21 May 2019 07:32  Ca    8.7        20 May 2019 05:46    TPro  6.5    /  Alb  1.8    /  TBili  5.7    /  DBili  3.05   /  AST  75     /  ALT  26     /  AlkPhos  194    26 May 2019 07:34  TPro  6.7    /  Alb  1.8    /  TBili  6.2    /  DBili  3.06   /  AST  76     /  ALT  24     /  AlkPhos  151    25 May 2019 07:18  TPro  7.3    /  Alb  1.9    /  TBili  6.0    /  DBili  x      /  AST  80     /  ALT  27     /  AlkPhos  137    23 May 2019 08:46  TPro  6.9    /  Alb  1.8    /  TBili  5.7    /  DBili  x      /  AST  73     /  ALT  24     /  AlkPhos  144    22 May 2019 06:22  TPro  7.8    /  Alb  1.9    /  TBili  7.0    /  DBili  x      /  AST  93     /  ALT  25     /  AlkPhos  144    21 May 2019 07:32  TPro  7.6    /  Alb  1.8    /  TBili  6.5    /  DBili  3.60   /  AST  76     /  ALT  23     /  AlkPhos  123    20 May 2019 05:46              CAPILLARY BLOOD GLUCOSE              RADIOLOGY & ADDITIONAL TESTS:    Imaging Personally Reviewed:  [ ] YES  [ ] NO    Consultant(s) Notes Reviewed:  [ ] YES  [ ] NO    Care Discussed with Consultants/Other Providers [ ] YES  [ ] NO

## 2019-05-26 NOTE — PROGRESS NOTE ADULT - PROBLEM SELECTOR PLAN 1
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

## 2019-05-26 NOTE — PROGRESS NOTE ADULT - NSHPATTENDINGPLANDISCUSS_GEN_ALL_CORE
Patient  family
Patient  family
Patient vascular
Patient  family
Patient vascular

## 2019-05-26 NOTE — ACUTE INTERFACILITY TRANSFER NOTE - SECONDARY DIAGNOSIS.
Acute blood loss anemia RICHAR (acute kidney injury) Cellulitis of left lower extremity Ascites due to alcoholic cirrhosis

## 2019-05-27 PROCEDURE — 99232 SBSQ HOSP IP/OBS MODERATE 35: CPT

## 2019-05-27 RX ADMIN — Medication 1 MILLIGRAM(S): at 11:46

## 2019-05-27 RX ADMIN — Medication 20 MILLIGRAM(S): at 22:40

## 2019-05-27 RX ADMIN — Medication 20 MILLIGRAM(S): at 05:27

## 2019-05-27 RX ADMIN — LINEZOLID 600 MILLIGRAM(S): 600 INJECTION, SOLUTION INTRAVENOUS at 05:27

## 2019-05-27 RX ADMIN — CHLORHEXIDINE GLUCONATE 1 APPLICATION(S): 213 SOLUTION TOPICAL at 05:28

## 2019-05-27 RX ADMIN — BUMETANIDE 1 MILLIGRAM(S): 0.25 INJECTION INTRAMUSCULAR; INTRAVENOUS at 05:27

## 2019-05-27 RX ADMIN — LACTULOSE 10 GRAM(S): 10 SOLUTION ORAL at 17:36

## 2019-05-27 RX ADMIN — BUMETANIDE 1 MILLIGRAM(S): 0.25 INJECTION INTRAMUSCULAR; INTRAVENOUS at 17:36

## 2019-05-27 RX ADMIN — LINEZOLID 600 MILLIGRAM(S): 600 INJECTION, SOLUTION INTRAVENOUS at 17:36

## 2019-05-27 RX ADMIN — LACTULOSE 10 GRAM(S): 10 SOLUTION ORAL at 05:27

## 2019-05-27 RX ADMIN — Medication 20 MILLIGRAM(S): at 14:44

## 2019-05-27 RX ADMIN — Medication 1 TABLET(S): at 11:46

## 2019-05-27 RX ADMIN — PANTOPRAZOLE SODIUM 40 MILLIGRAM(S): 20 TABLET, DELAYED RELEASE ORAL at 08:22

## 2019-05-27 NOTE — PROGRESS NOTE ADULT - ASSESSMENT
53 yo AAM  with h/o HTN and ETOH abuse brought in by wife for generalized weakness and dark urine; pt  found to have lactic acidosis (8.8), possible RICHAR with Hb 2.9 and repeat 2.4. In the ER pt was hemodynamically stable    Patient improving. Some recovery of H/H following a total of 9 UNITS of PRBC  this admission.  Hematologic derangement's are most likely multifactorial: Acute blood loss anemia into left leg Hematoma, Active hemalosis, Poor clotting factor synthesis secondary to cirrhosis of the liver   Appreciate Hematology, Vascular, Renal, GI, ID consults as this is a multi desplinary case      Pt had fall 3 nights ago, has lumbar hematoma, hgb dropped  . CT scan reviewed no signs of internal bleeding. Continue to monitor      Transfuse another unit/platelets today. check hgb level too   pt accepted for transfer for further management  at [facility]

## 2019-05-27 NOTE — PROGRESS NOTE ADULT - PROBLEM SELECTOR PLAN 2
8.4  ( 4.68 ) / 81 / 17 /  127 .==> 10.1 / 11.2 / 21 / 108  ==> 9.2 / 84 / 17 / 89  ==> 6.5 / 76 / 23 / 123  ==> 7.0 / 93 / 25 / 144 ==> 5.7  / 73 / 24 / 144  ==> 6.0 / 80 / 27 / 137 ==> 6.2 / 76 / 24 / 151 ==> 5.7 / 75 / 26 / 194 ,    elevated LFTS probably secondary to , meds ( eg Librium, antibiotics   ) and underlying cirrhosis . Viral studies negative. CT scan / Sono [id number] essentially negative.  On lactulose. ASMA 1:80 AMA < 1:20  YARED negative  Fe/TIBC/ Ferritin 62 / 148 / 129 ( essentially negative )  Viral studies negative. Patient's LFTs the same as on admission which is probably secondary to severe cirrhosis. Acute rise in hospital probably related to hemolysis and acute metabolic event / medications. Will need EGD / liver biopsy as out patient.

## 2019-05-27 NOTE — PROGRESS NOTE ADULT - SUBJECTIVE AND OBJECTIVE BOX
Patient is a 54y old  Male who presents with a chief complaint of anemia/ (26 May 2019 11:46)      INTERVAL HPI/OVERNIGHT EVENTS: no events , will check hgb again    MEDICATIONS  (STANDING):  buMETAnide 1 milliGRAM(s) Oral every 12 hours  chlorhexidine 2% Cloths 1 Application(s) Topical daily  chlorhexidine 4% Liquid 1 Application(s) Topical <User Schedule>  folic acid 1 milliGRAM(s) Oral daily  lactulose Syrup 10 Gram(s) Oral two times a day  linezolid    Tablet 600 milliGRAM(s) Oral every 12 hours  multivitamin 1 Tablet(s) Oral daily  pantoprazole    Tablet 40 milliGRAM(s) Oral before breakfast  predniSONE   Tablet 20 milliGRAM(s) Oral three times a day    MEDICATIONS  (PRN):  acetaminophen   Tablet .. 650 milliGRAM(s) Oral every 6 hours PRN Temp greater or equal to 38C (100.4F), Mild Pain (1 - 3)  oxyCODONE    5 mG/acetaminophen 325 mG 1 Tablet(s) Oral every 6 hours PRN Moderate Pain (4 - 6)  polyethylene glycol 3350 17 Gram(s) Oral daily PRN Constipation      Allergies    No Known Allergies      PHYSICAL EXAM:  GENERAL: NAD, well-groomed, well-developed  HEAD:  Atraumatic, Normocephalic  EYES: EOMI, PERRLA, conjunctiva and sclera clear  ENMT: No tonsillar erythema, exudates, or enlargement; Moist mucous membranes, Good dentition, No lesions  NECK: Supple, No JVD, Normal thyroid  NERVOUS SYSTEM:  Alert & Oriented X3, Good concentration; Motor Strength 5/5 B/L upper and lower extremities; DTRs 2+ intact and symmetric  CHEST/LUNG: Clear to percussion bilaterally; No rales, rhonchi, wheezing, or rubs  HEART: Regular rate and rhythm; No murmurs, rubs, or gallops  ABDOMEN: Soft, Nontender, Nondistended; Bowel sounds present decreased ascitis Lumbar back hematoma   EXTREMITIES:  2+ Peripheral Pulses,  b/l edema   SKIN:  jaundice poor toenail care       LABS:                        6.5    12.30 )-----------( 103      ( 26 May 2019 07:34 )             19.2     05-25    140  |  103  |  32<H>  ----------------------------<  157<H>  3.7   |  26  |  1.21    Ca    7.9<L>      25 May 2019 11:51    TPro  6.5  /  Alb  1.8<L>  /  TBili  5.7<H>  /  DBili  3.05<H>  /  AST  75<H>  /  ALT  26  /  AlkPhos  194<H>  05-26        CAPILLARY BLOOD GLUCOSE          RADIOLOGY & ADDITIONAL TESTS:    Imaging Personally Reviewed:  [ X] YES  [ ] NO    Consultant(s) Notes Reviewed:  [ X] YES  [ ] NO    Care Discussed with Consultants/Other Providers [X ] YES  [ ] NO

## 2019-05-27 NOTE — PROGRESS NOTE ADULT - SUBJECTIVE AND OBJECTIVE BOX
Patient is a 54y old  Male who presents with a chief complaint of anemia/ (27 May 2019 13:20)      HPI:  54 year old M HTN and ETOH abuse brought in by wife for generalized weakness and dark urine.  Wife reports patient has been having non-bloody non-bilious vomiting last week which stopped his drinking on Friday 5/3.  Wife also notes he has been having easy bruising and L leg swelling over the same amount of time.  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  Wife subsequently added that he has been having weakness and fatigue since early March with episodes of gum bleeding going back to early January.  Of note, wife noted that his skin color has changed over the past week.  Pt denies fevers, chills, eye pain vision changes, (08 May 2019 23:01)      INTERVAL HPI/OVERNIGHT EVENTS:  The patient denies melena, hematochezia, hematemesis, nausea, vomiting, abdominal pain, constipation, diarrhea, or change in bowel movements Tolerating diet    MEDICATIONS  (STANDING):  buMETAnide 1 milliGRAM(s) Oral every 12 hours  chlorhexidine 2% Cloths 1 Application(s) Topical daily  chlorhexidine 4% Liquid 1 Application(s) Topical <User Schedule>  folic acid 1 milliGRAM(s) Oral daily  lactulose Syrup 10 Gram(s) Oral two times a day  linezolid    Tablet 600 milliGRAM(s) Oral every 12 hours  multivitamin 1 Tablet(s) Oral daily  pantoprazole    Tablet 40 milliGRAM(s) Oral before breakfast  predniSONE   Tablet 20 milliGRAM(s) Oral three times a day    MEDICATIONS  (PRN):  acetaminophen   Tablet .. 650 milliGRAM(s) Oral every 6 hours PRN Temp greater or equal to 38C (100.4F), Mild Pain (1 - 3)  oxyCODONE    5 mG/acetaminophen 325 mG 1 Tablet(s) Oral every 6 hours PRN Moderate Pain (4 - 6)  polyethylene glycol 3350 17 Gram(s) Oral daily PRN Constipation      FAMILY HISTORY:      Allergies    No Known Allergies    Intolerances        PMH/PSH:  Benign essential HTN        REVIEW OF SYSTEMS:  CONSTITUTIONAL: No fever, weight loss  RESPIRATORY: No cough, wheezing, chills or hemoptysis; No shortness of breath  CARDIOVASCULAR: No chest pain, palpitations, dizziness, or leg swelling  GASTROINTESTINAL: No abdominal or epigastric pain. No nausea, vomiting, or hematemesis; No diarrhea or constipation. No melena or hematochezia.      Vital Signs Last 24 Hrs  T(C): 36.1 (27 May 2019 12:34), Max: 37.3 (26 May 2019 22:01)  T(F): 97 (27 May 2019 12:34), Max: 99.1 (26 May 2019 22:01)  HR: 80 (27 May 2019 12:34) (77 - 86)  BP: 111/63 (27 May 2019 12:34) (105/48 - 141/71)  BP(mean): --  RR: 17 (27 May 2019 12:34) (17 - 18)  SpO2: 100% (27 May 2019 12:34) (96% - 100%)    PHYSICAL EXAM:  GENERAL: NAD, well-groomed, well-developed  CHEST/LUNG: Clear to percussion bilaterally; No rales, rhonchi, wheezing, or rubs  HEART: Regular rate and rhythm; No murmurs, rubs, or gallops  ABDOMEN: Soft, Nontender, Nondistended; Bowel sounds present      LAB                          6.5    12.30 )-----------( 103      ( 26 May 2019 07:34 )             19.2       CBC:  05-26 @ 07:34  WBC 12.30   Hgb 6.5   Hct 19.2   Plts 103  .0  05-25 @ 21:58  WBC 13.97   Hgb 7.0   Hct 20.9   Plts 102  MCV 98.1  05-25 @ 07:18  WBC 11.38   Hgb 6.8   Hct 20.4   Plts 107  MCV 99.0  05-24 @ 16:22  WBC 14.40   Hgb 6.3   Hct 19.2   Plts 113  .5  05-23 @ 08:46  WBC 13.29   Hgb 7.6   Hct 23.7   Plts 143  MCV 98.3  05-22 @ 06:22  WBC 10.97   Hgb 7.3   Hct 23.1   Plts 153  MCV 98.3  05-21 @ 07:32  WBC 11.97   Hgb 8.8   Hct 27.8   Plts 186  MCV 99.3      Chemistry:  05-25 @ 11:51  Na+ 140  K+ 3.7  Cl- 103  CO2 26  BUN 32  Cr 1.21     05-23 @ 08:46  Na+ 138  K+ 3.2  Cl- 101  CO2 29  BUN 30  Cr 1.20     05-22 @ 06:22  Na+ 139  K+ 3.5  Cl- 104  CO2 28  BUN 30  Cr 1.19     05-21 @ 07:32  Na+ 142  K+ 3.9  Cl- 107  CO2 26  BUN 32  Cr 1.41         Glucose, Serum: 157 mg/dL (05-25 @ 11:51)  Glucose, Serum: 133 mg/dL (05-23 @ 08:46)  Glucose, Serum: 140 mg/dL (05-22 @ 06:22)  Glucose, Serum: 118 mg/dL (05-21 @ 07:32)      25 May 2019 11:51    140    |  103    |  32     ----------------------------<  157    3.7     |  26     |  1.21   23 May 2019 08:46    138    |  101    |  30     ----------------------------<  133    3.2     |  29     |  1.20   22 May 2019 06:22    139    |  104    |  30     ----------------------------<  140    3.5     |  28     |  1.19   21 May 2019 07:32    142    |  107    |  32     ----------------------------<  118    3.9     |  26     |  1.41     Ca    7.9        25 May 2019 11:51  Ca    8.3        23 May 2019 08:46  Ca    8.5        22 May 2019 06:22  Ca    8.6        21 May 2019 07:32    TPro  6.5    /  Alb  1.8    /  TBili  5.7    /  DBili  3.05   /  AST  75     /  ALT  26     /  AlkPhos  194    26 May 2019 07:34  TPro  6.7    /  Alb  1.8    /  TBili  6.2    /  DBili  3.06   /  AST  76     /  ALT  24     /  AlkPhos  151    25 May 2019 07:18  TPro  7.3    /  Alb  1.9    /  TBili  6.0    /  DBili  x      /  AST  80     /  ALT  27     /  AlkPhos  137    23 May 2019 08:46  TPro  6.9    /  Alb  1.8    /  TBili  5.7    /  DBili  x      /  AST  73     /  ALT  24     /  AlkPhos  144    22 May 2019 06:22  TPro  7.8    /  Alb  1.9    /  TBili  7.0    /  DBili  x      /  AST  93     /  ALT  25     /  AlkPhos  144    21 May 2019 07:32              CAPILLARY BLOOD GLUCOSE              RADIOLOGY & ADDITIONAL TESTS:    Imaging Personally Reviewed:  [ ] YES  [ ] NO    Consultant(s) Notes Reviewed:  [ ] YES  [ ] NO    Care Discussed with Consultants/Other Providers [ ] YES  [ ] NO

## 2019-05-28 LAB
ALBUMIN SERPL ELPH-MCNC: 2 G/DL — LOW (ref 3.3–5)
ALP SERPL-CCNC: 173 U/L — HIGH (ref 40–120)
ALT FLD-CCNC: 29 U/L — SIGNIFICANT CHANGE UP (ref 12–78)
ANION GAP SERPL CALC-SCNC: 10 MMOL/L — SIGNIFICANT CHANGE UP (ref 5–17)
AST SERPL-CCNC: 82 U/L — HIGH (ref 15–37)
BILIRUB DIRECT SERPL-MCNC: 3.6 MG/DL — HIGH (ref 0.05–0.2)
BILIRUB INDIRECT FLD-MCNC: 4.3 MG/DL — HIGH (ref 0.2–1)
BILIRUB SERPL-MCNC: 7.9 MG/DL — HIGH (ref 0.2–1.2)
BUN SERPL-MCNC: 30 MG/DL — HIGH (ref 7–23)
CALCIUM SERPL-MCNC: 8.1 MG/DL — LOW (ref 8.5–10.1)
CHLORIDE SERPL-SCNC: 102 MMOL/L — SIGNIFICANT CHANGE UP (ref 96–108)
CO2 SERPL-SCNC: 28 MMOL/L — SIGNIFICANT CHANGE UP (ref 22–31)
CREAT SERPL-MCNC: 1.24 MG/DL — SIGNIFICANT CHANGE UP (ref 0.5–1.3)
GLUCOSE SERPL-MCNC: 147 MG/DL — HIGH (ref 70–99)
HCT VFR BLD CALC: 23.3 % — LOW (ref 39–50)
HGB BLD-MCNC: 7.9 G/DL — LOW (ref 13–17)
MCHC RBC-ENTMCNC: 32.5 PG — SIGNIFICANT CHANGE UP (ref 27–34)
MCHC RBC-ENTMCNC: 33.9 GM/DL — SIGNIFICANT CHANGE UP (ref 32–36)
MCV RBC AUTO: 95.9 FL — SIGNIFICANT CHANGE UP (ref 80–100)
NRBC # BLD: 0 /100 WBCS — SIGNIFICANT CHANGE UP (ref 0–0)
PLATELET # BLD AUTO: 122 K/UL — LOW (ref 150–400)
POTASSIUM SERPL-MCNC: 3.8 MMOL/L — SIGNIFICANT CHANGE UP (ref 3.5–5.3)
POTASSIUM SERPL-SCNC: 3.8 MMOL/L — SIGNIFICANT CHANGE UP (ref 3.5–5.3)
PROT SERPL-MCNC: 7.1 GM/DL — SIGNIFICANT CHANGE UP (ref 6–8.3)
RBC # BLD: 2.43 M/UL — LOW (ref 4.2–5.8)
RBC # FLD: 26.1 % — HIGH (ref 10.3–14.5)
SODIUM SERPL-SCNC: 140 MMOL/L — SIGNIFICANT CHANGE UP (ref 135–145)
WBC # BLD: 10.27 K/UL — SIGNIFICANT CHANGE UP (ref 3.8–10.5)
WBC # FLD AUTO: 10.27 K/UL — SIGNIFICANT CHANGE UP (ref 3.8–10.5)

## 2019-05-28 PROCEDURE — 99233 SBSQ HOSP IP/OBS HIGH 50: CPT

## 2019-05-28 RX ADMIN — BUMETANIDE 1 MILLIGRAM(S): 0.25 INJECTION INTRAMUSCULAR; INTRAVENOUS at 05:51

## 2019-05-28 RX ADMIN — Medication 20 MILLIGRAM(S): at 05:51

## 2019-05-28 RX ADMIN — LINEZOLID 600 MILLIGRAM(S): 600 INJECTION, SOLUTION INTRAVENOUS at 17:24

## 2019-05-28 RX ADMIN — PANTOPRAZOLE SODIUM 40 MILLIGRAM(S): 20 TABLET, DELAYED RELEASE ORAL at 08:25

## 2019-05-28 RX ADMIN — OXYCODONE AND ACETAMINOPHEN 1 TABLET(S): 5; 325 TABLET ORAL at 12:35

## 2019-05-28 RX ADMIN — Medication 20 MILLIGRAM(S): at 13:28

## 2019-05-28 RX ADMIN — OXYCODONE AND ACETAMINOPHEN 1 TABLET(S): 5; 325 TABLET ORAL at 11:39

## 2019-05-28 RX ADMIN — Medication 1 TABLET(S): at 11:35

## 2019-05-28 RX ADMIN — Medication 20 MILLIGRAM(S): at 22:23

## 2019-05-28 RX ADMIN — Medication 1 MILLIGRAM(S): at 11:39

## 2019-05-28 RX ADMIN — LACTULOSE 10 GRAM(S): 10 SOLUTION ORAL at 17:24

## 2019-05-28 RX ADMIN — LINEZOLID 600 MILLIGRAM(S): 600 INJECTION, SOLUTION INTRAVENOUS at 05:51

## 2019-05-28 RX ADMIN — BUMETANIDE 1 MILLIGRAM(S): 0.25 INJECTION INTRAMUSCULAR; INTRAVENOUS at 17:25

## 2019-05-28 RX ADMIN — CHLORHEXIDINE GLUCONATE 1 APPLICATION(S): 213 SOLUTION TOPICAL at 05:50

## 2019-05-28 RX ADMIN — LACTULOSE 10 GRAM(S): 10 SOLUTION ORAL at 05:50

## 2019-05-28 NOTE — PROGRESS NOTE ADULT - PROBLEM SELECTOR PLAN 2
8.4  ( 4.68 ) / 81 / 17 /  127 .==> 10.1 / 11.2 / 21 / 108  ==> 9.2 / 84 / 17 / 89  ==> 6.5 / 76 / 23 / 123  ==> 7.0 / 93 / 25 / 144 ==> 5.7  / 73 / 24 / 144  ==> 6.0 / 80 / 27 / 137 ==> 6.2 / 76 / 24 / 151 ==> 5.7 / 75 / 26 / 194 ,  ==> 7.9 / 82 / 29 / 173 elevated LFTS probably secondary to , meds ( eg  antibiotics   ) and underlying cirrhosis . Viral studies negative. CT scan / Sono [id number] essentially negative.  On lactulose. ASMA 1:80 AMA < 1:20  YARED negative  Fe/TIBC/ Ferritin 62 / 148 / 129 ( essentially negative )  Viral studies negative. Patient's LFTs the same as on admission which is probably secondary to severe cirrhosis. Acute rise in hospital probably related to hemolysis and acute metabolic event / medications. Will need EGD / liver biopsy as out patient.

## 2019-05-28 NOTE — DISCHARGE NOTE NURSING/CASE MANAGEMENT/SOCIAL WORK - NSDCDPATPORTLINK_GEN_ALL_CORE
You can access the [facility] Patient Portal, offered by [facility], by registering with the following website: [email or web address]

## 2019-05-28 NOTE — PROGRESS NOTE ADULT - PROBLEM SELECTOR PLAN 1
into left leg hematoma s/p 9 units prbc doinjg better hematorit is stable into left leg hematoma s/p 9 units prbc doinjg better hematorit is stable at 7.9.

## 2019-05-28 NOTE — PROGRESS NOTE ADULT - PROBLEM SELECTOR PLAN 2
unsure further tests pending  s/p 3 day course of steroids   continue to follow cbc and ldh unsure further tests pending  s/p 3 day course of steroids prednisone 20 mg bid.   continue to follow cbc daily.

## 2019-05-28 NOTE — PROGRESS NOTE ADULT - PROBLEM SELECTOR PLAN 1
- Patient Awaiting transfer to [facility] for Further work up, management, including work-up of cirrhosis/transplant center  - Anemia - some evidence of possible hemolysis - cirrhosis related ( e.g Spurr cell, Zieve's syndrome, other cause) - for transfer to [facility] for further work-up  - Abx for h/o cellulitis on linezolid - watch platelet count  - renal f/u for Renal failure

## 2019-05-28 NOTE — PROGRESS NOTE ADULT - SUBJECTIVE AND OBJECTIVE BOX
Patient is a 54y old  Male who presents with a chief complaint of anemia/ (28 May 2019 12:23)      HPI:  54 year old M HTN and ETOH abuse brought in by wife for generalized weakness and dark urine.  Wife reports patient has been having non-bloody non-bilious vomiting last week which stopped his drinking on Friday 5/3.  Wife also notes he has been having easy bruising and L leg swelling over the same amount of time.  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  Wife subsequently added that he has been having weakness and fatigue since early March with episodes of gum bleeding going back to early January.  Of note, wife noted that his skin color has changed over the past week.  Pt denies fevers, chills, eye pain vision changes, (08 May 2019 23:01)      INTERVAL HPI/OVERNIGHT EVENTS:  The patient denies melena, hematochezia, hematemesis, nausea, vomiting, abdominal pain, constipation, diarrhea, or change in bowel movements     MEDICATIONS  (STANDING):  buMETAnide 1 milliGRAM(s) Oral every 12 hours  chlorhexidine 4% Liquid 1 Application(s) Topical <User Schedule>  folic acid 1 milliGRAM(s) Oral daily  lactulose Syrup 10 Gram(s) Oral two times a day  linezolid    Tablet 600 milliGRAM(s) Oral every 12 hours  multivitamin 1 Tablet(s) Oral daily  pantoprazole    Tablet 40 milliGRAM(s) Oral before breakfast  predniSONE   Tablet 20 milliGRAM(s) Oral three times a day    MEDICATIONS  (PRN):  acetaminophen   Tablet .. 650 milliGRAM(s) Oral every 6 hours PRN Temp greater or equal to 38C (100.4F), Mild Pain (1 - 3)  oxyCODONE    5 mG/acetaminophen 325 mG 1 Tablet(s) Oral every 6 hours PRN Moderate Pain (4 - 6)  polyethylene glycol 3350 17 Gram(s) Oral daily PRN Constipation      FAMILY HISTORY:      Allergies    No Known Allergies    Intolerances        PMH/PSH:  Benign essential HTN        REVIEW OF SYSTEMS:  CONSTITUTIONAL: No fever, weight loss, or fatigue  EYES: No eye pain, visual disturbances, or discharge  ENMT:  No difficulty hearing, tinnitus, vertigo; No sinus or throat pain  NECK: No pain or stiffness  BREASTS: No pain, masses, or nipple discharge  RESPIRATORY: No cough, wheezing, chills or hemoptysis; No shortness of breath  CARDIOVASCULAR: No chest pain, palpitations, dizziness, or leg swelling  GASTROINTESTINAL:  see above  GENITOURINARY: No dysuria, frequency, hematuria, or incontinence  NEUROLOGICAL: No headaches, memory loss, loss of strength, numbness, or tremors  SKIN: No itching, burning, rashes, or lesions   LYMPH NODES: No enlarged glands  ENDOCRINE: No heat or cold intolerance; No hair loss  MUSCULOSKELETAL: No joint pain or swelling; No muscle, back, or extremity pain  PSYCHIATRIC: No depression, anxiety, mood swings, or difficulty sleeping  HEME/LYMPH: No easy bruising, or bleeding gums  ALLERGY AND IMMUNOLOGIC: No hives or eczema    Vital Signs Last 24 Hrs  T(C): 36.1 (28 May 2019 17:22), Max: 37.3 (27 May 2019 23:28)  T(F): 97 (28 May 2019 17:22), Max: 99.1 (27 May 2019 23:28)  HR: 76 (28 May 2019 17:22) (76 - 92)  BP: 127/69 (28 May 2019 17:22) (104/61 - 127/69)  BP(mean): --  RR: 17 (28 May 2019 12:32) (17 - 17)  SpO2: 98% (28 May 2019 17:22) (97% - 100%)    PHYSICAL EXAM:  GENERAL: NAD, well-groomed, well-developed  HEAD:  Atraumatic, Normocephalic  EYES: EOMI, PERRLA, conjunctiva and sclera clear  NECK: Supple, No JVD, Normal thyroid  NERVOUS SYSTEM:  Alert & Oriented X3, Good concentration;  CHEST/LUNG: Clear to percussion bilaterally; No rales, rhonchi, wheezing, or rubs  HEART: Regular rate and rhythm; No murmurs, rubs, or gallops  ABDOMEN: Soft, Nontender, Nondistended; Bowel sounds present  EXTREMITIES:  2+ Peripheral Pulses, No clubbing, cyanosis, or edema  LYMPH: No lymphadenopathy noted  SKIN: No rashes or lesions    LAB                          7.9    10.27 )-----------( 122      ( 28 May 2019 02:02 )             23.3       CBC:  05-28 @ 02:02  WBC 10.27   Hgb 7.9   Hct 23.3   Plts 122  MCV 95.9  05-26 @ 07:34  WBC 12.30   Hgb 6.5   Hct 19.2   Plts 103  .0  05-25 @ 21:58  WBC 13.97   Hgb 7.0   Hct 20.9   Plts 102  MCV 98.1  05-25 @ 07:18  WBC 11.38   Hgb 6.8   Hct 20.4   Plts 107  MCV 99.0  05-24 @ 16:22  WBC 14.40   Hgb 6.3   Hct 19.2   Plts 113  .5  05-23 @ 08:46  WBC 13.29   Hgb 7.6   Hct 23.7   Plts 143  MCV 98.3  05-22 @ 06:22  WBC 10.97   Hgb 7.3   Hct 23.1   Plts 153  MCV 98.3      Chemistry:  05-28 @ 07:43  Na+ 140  K+ 3.8  Cl- 102  CO2 28  BUN 30  Cr 1.24     05-25 @ 11:51  Na+ 140  K+ 3.7  Cl- 103  CO2 26  BUN 32  Cr 1.21     05-23 @ 08:46  Na+ 138  K+ 3.2  Cl- 101  CO2 29  BUN 30  Cr 1.20     05-22 @ 06:22  Na+ 139  K+ 3.5  Cl- 104  CO2 28  BUN 30  Cr 1.19         Glucose, Serum: 147 mg/dL (05-28 @ 07:43)  Glucose, Serum: 157 mg/dL (05-25 @ 11:51)  Glucose, Serum: 133 mg/dL (05-23 @ 08:46)  Glucose, Serum: 140 mg/dL (05-22 @ 06:22)      28 May 2019 07:43    140    |  102    |  30     ----------------------------<  147    3.8     |  28     |  1.24   25 May 2019 11:51    140    |  103    |  32     ----------------------------<  157    3.7     |  26     |  1.21   23 May 2019 08:46    138    |  101    |  30     ----------------------------<  133    3.2     |  29     |  1.20   22 May 2019 06:22    139    |  104    |  30     ----------------------------<  140    3.5     |  28     |  1.19     Ca    8.1        28 May 2019 07:43  Ca    7.9        25 May 2019 11:51  Ca    8.3        23 May 2019 08:46  Ca    8.5        22 May 2019 06:22    TPro  7.1    /  Alb  2.0    /  TBili  7.9    /  DBili  3.60   /  AST  82     /  ALT  29     /  AlkPhos  173    28 May 2019 07:43  TPro  6.5    /  Alb  1.8    /  TBili  5.7    /  DBili  3.05   /  AST  75     /  ALT  26     /  AlkPhos  194    26 May 2019 07:34  TPro  6.7    /  Alb  1.8    /  TBili  6.2    /  DBili  3.06   /  AST  76     /  ALT  24     /  AlkPhos  151    25 May 2019 07:18  TPro  7.3    /  Alb  1.9    /  TBili  6.0    /  DBili  x      /  AST  80     /  ALT  27     /  AlkPhos  137    23 May 2019 08:46  TPro  6.9    /  Alb  1.8    /  TBili  5.7    /  DBili  x      /  AST  73     /  ALT  24     /  AlkPhos  144    22 May 2019 06:22              CAPILLARY BLOOD GLUCOSE              RADIOLOGY & ADDITIONAL TESTS:    Imaging Personally Reviewed:  [ ] YES  [ ] NO    Consultant(s) Notes Reviewed:  [ ] YES  [ ] NO    Care Discussed with Consultants/Other Providers [ ] YES  [ ] NO

## 2019-05-28 NOTE — PROGRESS NOTE ADULT - PROBLEM SELECTOR PLAN 4
wbc count ok no fever however extensive cellulitis on imaging on   Vancomycin and Zosyn   ID was called wbc count ok no fever however extensive cellulitis. ID changed ABX to Zyvox 600 bid for 5 more days.

## 2019-05-28 NOTE — PROGRESS NOTE ADULT - SUBJECTIVE AND OBJECTIVE BOX
INTERVAL HPI/OVERNIGHT EVENTS:  Pt seen and examined at bedside.     Allergies/Intolerance: No Known Allergies      MEDICATIONS  (STANDING):  buMETAnide 1 milliGRAM(s) Oral every 12 hours  chlorhexidine 4% Liquid 1 Application(s) Topical <User Schedule>  folic acid 1 milliGRAM(s) Oral daily  lactulose Syrup 10 Gram(s) Oral two times a day  linezolid    Tablet 600 milliGRAM(s) Oral every 12 hours  multivitamin 1 Tablet(s) Oral daily  pantoprazole    Tablet 40 milliGRAM(s) Oral before breakfast  predniSONE   Tablet 20 milliGRAM(s) Oral three times a day    MEDICATIONS  (PRN):  acetaminophen   Tablet .. 650 milliGRAM(s) Oral every 6 hours PRN Temp greater or equal to 38C (100.4F), Mild Pain (1 - 3)  oxyCODONE    5 mG/acetaminophen 325 mG 1 Tablet(s) Oral every 6 hours PRN Moderate Pain (4 - 6)  polyethylene glycol 3350 17 Gram(s) Oral daily PRN Constipation        ROS: all systems reviewed and wnl      PHYSICAL EXAMINATION:  Vital Signs Last 24 Hrs  T(C): 36.7 (28 May 2019 05:30), Max: 37.3 (27 May 2019 23:28)  T(F): 98 (28 May 2019 05:30), Max: 99.1 (27 May 2019 23:28)  HR: 92 (28 May 2019 05:30) (80 - 92)  BP: 104/61 (28 May 2019 05:30) (104/61 - 117/77)  BP(mean): --  RR: 17 (28 May 2019 05:30) (17 - 17)  SpO2: 98% (28 May 2019 05:30) (97% - 100%)  CAPILLARY BLOOD GLUCOSE          05-27 @ 07:01  -  05-28 @ 07:00  --------------------------------------------------------  IN: 150 mL / OUT: 0 mL / NET: 150 mL        GENERAL:   NECK: supple, No JVD  CHEST/LUNG: clear to auscultation bilaterally; no rales, rhonchi, or wheezing b/l  HEART: normal S1, S2  ABDOMEN: BS+, soft, ND, NT   EXTREMITIES:  pulses palpable; no clubbing, cyanosis, or edema b/l LEs  SKIN: no rashes or lesions      LABS:                        7.9    10.27 )-----------( 122      ( 28 May 2019 02:02 )             23.3     05-28    140  |  102  |  30<H>  ----------------------------<  147<H>  3.8   |  28  |  1.24    Ca    8.1<L>      28 May 2019 07:43    TPro  7.1  /  Alb  2.0<L>  /  TBili  7.9<H>  /  DBili  3.60<H>  /  AST  82<H>  /  ALT  29  /  AlkPhos  173<H>  05-28 INTERVAL HPI/OVERNIGHT EVENTS:  Pt seen and examined at bedside.     Allergies/Intolerance: No Known Allergies      MEDICATIONS  (STANDING):  buMETAnide 1 milliGRAM(s) Oral every 12 hours  chlorhexidine 4% Liquid 1 Application(s) Topical <User Schedule>  folic acid 1 milliGRAM(s) Oral daily  lactulose Syrup 10 Gram(s) Oral two times a day  linezolid    Tablet 600 milliGRAM(s) Oral every 12 hours  multivitamin 1 Tablet(s) Oral daily  pantoprazole    Tablet 40 milliGRAM(s) Oral before breakfast  predniSONE   Tablet 20 milliGRAM(s) Oral three times a day    MEDICATIONS  (PRN):  acetaminophen   Tablet .. 650 milliGRAM(s) Oral every 6 hours PRN Temp greater or equal to 38C (100.4F), Mild Pain (1 - 3)  oxyCODONE    5 mG/acetaminophen 325 mG 1 Tablet(s) Oral every 6 hours PRN Moderate Pain (4 - 6)  polyethylene glycol 3350 17 Gram(s) Oral daily PRN Constipation        ROS: all systems reviewed and wnl      PHYSICAL EXAMINATION:  Vital Signs Last 24 Hrs  T(C): 36.7 (28 May 2019 05:30), Max: 37.3 (27 May 2019 23:28)  T(F): 98 (28 May 2019 05:30), Max: 99.1 (27 May 2019 23:28)  HR: 92 (28 May 2019 05:30) (80 - 92)  BP: 104/61 (28 May 2019 05:30) (104/61 - 117/77)  BP(mean): --  RR: 17 (28 May 2019 05:30) (17 - 17)  SpO2: 98% (28 May 2019 05:30) (97% - 100%)  CAPILLARY BLOOD GLUCOSE          05-27 @ 07:01  -  05-28 @ 07:00  --------------------------------------------------------  IN: 150 mL / OUT: 0 mL / NET: 150 mL        GENERAL: stable in bed, NAD, comfortable, no fevers or SOB  NECK: supple, No JVD  CHEST/LUNG: clear to auscultation bilaterally; no rales, rhonchi, or wheezing b/l  HEART: normal S1, S2  ABDOMEN: BS+, soft, ND, NT   EXTREMITIES:  pulses palpable; no clubbing, cyanosis, or edema b/l LEs  SKIN: no rashes or lesions      LABS:                        7.9    10.27 )-----------( 122      ( 28 May 2019 02:02 )             23.3     05-28    140  |  102  |  30<H>  ----------------------------<  147<H>  3.8   |  28  |  1.24    Ca    8.1<L>      28 May 2019 07:43    TPro  7.1  /  Alb  2.0<L>  /  TBili  7.9<H>  /  DBili  3.60<H>  /  AST  82<H>  /  ALT  29  /  AlkPhos  173<H>  05-28

## 2019-05-28 NOTE — PROGRESS NOTE ADULT - ASSESSMENT
53 yo AAM  with h/o HTN and ETOH abuse brought in by wife for generalized weakness and dark urine; pt  found to have lactic acidosis (8.8), possible RICHAR with Hb 2.9 and repeat 2.4. In the ER pt was hemodynamically stable    Patient improving. Some recovery of H/H following a total of 9 UNITS of PRBC  this admission.  Hematologic derangement's are most likely multifactorial: Acute blood loss anemia into left leg Hematoma, Active hemalosis, Poor clotting factor synthesis secondary to cirrhosis of the liver   Appreciate Hematology, Vascular, Renal, GI, ID consults as this is a multi desplinary case      Pt had fall 3 nights ago, has lumbar hematoma, hgb dropped  . CT scan reviewed no signs of internal bleeding. Continue to monitor      Transfuse another unit/platelets today. check hgb level too   pt accepted for transfer for further management  at [facility] 53 yo AAM  with h/o HTN and ETOH abuse brought in by wife for generalized weakness and dark urine; pt  found to have lactic acidosis (8.8), possible RICHAR with Hb 2.9 and repeat 2.4. In the ER pt was hemodynamically stable    Patient improving. Some recovery of H/H following a total of 9 UNITS of PRBC  this admission.  Hematologic derangement's are most likely multifactorial: Acute blood loss anemia into left leg Hematoma, Active hemalosis, Poor clotting factor synthesis secondary to cirrhosis of the liver   Appreciate Hematology, Vascular, Renal, GI, ID consults as this is a multi desplinary case      Pt had fall 3 nights ago, has lumbar hematoma, hgb dropped  . CT scan reviewed no signs of internal bleeding. Continue to monitor      HGB stable today 7.9 on 5/28/19.

## 2019-05-28 NOTE — PROGRESS NOTE ADULT - SUBJECTIVE AND OBJECTIVE BOX
Patient states feels well    Vital Signs Last 24 Hrs  T(C): 36.7 (28 May 2019 05:30), Max: 37.3 (27 May 2019 23:28)  T(F): 98 (28 May 2019 05:30), Max: 99.1 (27 May 2019 23:28)  HR: 92 (28 May 2019 05:30) (80 - 92)  BP: 104/61 (28 May 2019 05:30) (104/61 - 117/77)  BP(mean): --  RR: 17 (28 May 2019 05:30) (17 - 17)  SpO2: 98% (28 May 2019 05:30) (97% - 100%)    PHYSICAL EXAM:    general - AAO x 3  HEENT - Icterus +  CVS - RRR  RS - AE B/L  Abd - soft, NT  Ext - Pulses +        LABS:                        7.9    10.27 )-----------( 122      ( 28 May 2019 02:02 )             23.3     05-28    140  |  102  |  30<H>  ----------------------------<  147<H>  3.8   |  28  |  1.24    Ca    8.1<L>      28 May 2019 07:43    TPro  7.1  /  Alb  2.0<L>  /  TBili  7.9<H>  /  DBili  3.60<H>  /  AST  82<H>  /  ALT  29  /  AlkPhos  173<H>  05-28            RADIOLOGY & ADDITIONAL STUDIES:

## 2019-05-29 LAB
ALBUMIN SERPL ELPH-MCNC: 1.9 G/DL — LOW (ref 3.3–5)
ALP SERPL-CCNC: 151 U/L — HIGH (ref 40–120)
ALT FLD-CCNC: 29 U/L — SIGNIFICANT CHANGE UP (ref 12–78)
AST SERPL-CCNC: 77 U/L — HIGH (ref 15–37)
BILIRUB DIRECT SERPL-MCNC: 3.58 MG/DL — HIGH (ref 0.05–0.2)
BILIRUB INDIRECT FLD-MCNC: 4.4 MG/DL — HIGH (ref 0.2–1)
BILIRUB SERPL-MCNC: 8 MG/DL — HIGH (ref 0.2–1.2)
PROT SERPL-MCNC: 6.9 GM/DL — SIGNIFICANT CHANGE UP (ref 6–8.3)

## 2019-05-29 PROCEDURE — 99232 SBSQ HOSP IP/OBS MODERATE 35: CPT

## 2019-05-29 RX ADMIN — Medication 20 MILLIGRAM(S): at 22:16

## 2019-05-29 RX ADMIN — CHLORHEXIDINE GLUCONATE 1 APPLICATION(S): 213 SOLUTION TOPICAL at 05:52

## 2019-05-29 RX ADMIN — LACTULOSE 10 GRAM(S): 10 SOLUTION ORAL at 17:45

## 2019-05-29 RX ADMIN — Medication 20 MILLIGRAM(S): at 13:37

## 2019-05-29 RX ADMIN — LINEZOLID 600 MILLIGRAM(S): 600 INJECTION, SOLUTION INTRAVENOUS at 05:52

## 2019-05-29 RX ADMIN — BUMETANIDE 1 MILLIGRAM(S): 0.25 INJECTION INTRAMUSCULAR; INTRAVENOUS at 05:51

## 2019-05-29 RX ADMIN — Medication 1 TABLET(S): at 12:33

## 2019-05-29 RX ADMIN — PANTOPRAZOLE SODIUM 40 MILLIGRAM(S): 20 TABLET, DELAYED RELEASE ORAL at 07:49

## 2019-05-29 RX ADMIN — LINEZOLID 600 MILLIGRAM(S): 600 INJECTION, SOLUTION INTRAVENOUS at 17:45

## 2019-05-29 RX ADMIN — BUMETANIDE 1 MILLIGRAM(S): 0.25 INJECTION INTRAMUSCULAR; INTRAVENOUS at 17:45

## 2019-05-29 RX ADMIN — LACTULOSE 10 GRAM(S): 10 SOLUTION ORAL at 05:51

## 2019-05-29 RX ADMIN — Medication 20 MILLIGRAM(S): at 05:52

## 2019-05-29 RX ADMIN — Medication 1 MILLIGRAM(S): at 12:33

## 2019-05-29 NOTE — PROGRESS NOTE ADULT - SUBJECTIVE AND OBJECTIVE BOX
Patient is a 54y old  Male who presents with a chief complaint of anemia/ (29 May 2019 10:56)      HPI:  54 year old M HTN and ETOH abuse brought in by wife for generalized weakness and dark urine.  Wife reports patient has been having non-bloody non-bilious vomiting last week which stopped his drinking on Friday 5/3.  Wife also notes he has been having easy bruising and L leg swelling over the same amount of time.  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  Wife subsequently added that he has been having weakness and fatigue since early March with episodes of gum bleeding going back to early January.  Of note, wife noted that his skin color has changed over the past week.  Pt denies fevers, chills, eye pain vision changes, (08 May 2019 23:01)      INTERVAL HPI/OVERNIGHT EVENTS:  The patient denies melena, hematochezia, hematemesis, nausea, vomiting, abdominal pain, constipation, diarrhea, or change in bowel movements Tolerating diet    MEDICATIONS  (STANDING):  buMETAnide 1 milliGRAM(s) Oral every 12 hours  chlorhexidine 4% Liquid 1 Application(s) Topical <User Schedule>  folic acid 1 milliGRAM(s) Oral daily  lactulose Syrup 10 Gram(s) Oral two times a day  linezolid    Tablet 600 milliGRAM(s) Oral every 12 hours  multivitamin 1 Tablet(s) Oral daily  pantoprazole    Tablet 40 milliGRAM(s) Oral before breakfast  predniSONE   Tablet 20 milliGRAM(s) Oral three times a day    MEDICATIONS  (PRN):  acetaminophen   Tablet .. 650 milliGRAM(s) Oral every 6 hours PRN Temp greater or equal to 38C (100.4F), Mild Pain (1 - 3)  oxyCODONE    5 mG/acetaminophen 325 mG 1 Tablet(s) Oral every 6 hours PRN Moderate Pain (4 - 6)  polyethylene glycol 3350 17 Gram(s) Oral daily PRN Constipation      FAMILY HISTORY:      Allergies    No Known Allergies    Intolerances        PMH/PSH:  Benign essential HTN        REVIEW OF SYSTEMS:  CONSTITUTIONAL: No fever, weight loss, or fatigue  EYES: No eye pain, visual disturbances, or discharge  ENMT:  No difficulty hearing, tinnitus, vertigo; No sinus or throat pain  NECK: No pain or stiffness  BREASTS: No pain, masses, or nipple discharge  RESPIRATORY: No cough, wheezing, chills or hemoptysis; No shortness of breath  CARDIOVASCULAR: No chest pain, palpitations, dizziness, or leg swelling  GASTROINTESTINAL: See above.  GENITOURINARY: No dysuria, frequency, hematuria, or incontinence  NEUROLOGICAL: No headaches, memory loss, loss of strength, numbness, or tremors  SKIN: No itching, burning, rashes, or lesions   LYMPH NODES: No enlarged glands  ENDOCRINE: No heat or cold intolerance; No hair loss  MUSCULOSKELETAL: No joint pain or swelling; No muscle, back, or extremity pain  PSYCHIATRIC: No depression, anxiety, mood swings, or difficulty sleeping  HEME/LYMPH: No easy bruising, or bleeding gums  ALLERGY AND IMMUNOLOGIC: No hives or eczema    Vital Signs Last 24 Hrs  T(C): 36.4 (29 May 2019 11:48), Max: 37.2 (28 May 2019 23:14)  T(F): 97.6 (29 May 2019 11:48), Max: 99 (28 May 2019 23:14)  HR: 88 (29 May 2019 11:48) (75 - 88)  BP: 104/65 (29 May 2019 11:48) (104/65 - 127/69)  BP(mean): --  RR: 17 (29 May 2019 11:48) (17 - 18)  SpO2: 99% (29 May 2019 11:48) (95% - 99%)    PHYSICAL EXAM:  GENERAL: NAD, well-groomed, well-developed  HEAD:  Atraumatic, Normocephalic  EYES: EOMI, PERRLA, conjunctiva and sclera clear  NECK: Supple, No JVD, Normal thyroid  NERVOUS SYSTEM:  Alert & Oriented X3, Good concentration;   CHEST/LUNG: Clear to percussion bilaterally; No rales, rhonchi, wheezing, or rubs  HEART: Regular rate and rhythm; No murmurs, rubs, or gallops  ABDOMEN: Soft, Nontender, Nondistended; Bowel sounds present  EXTREMITIES:  2+ Peripheral Pulses, No clubbing, cyanosis, or edema  LYMPH: No lymphadenopathy noted  SKIN: No rashes or lesions    LAB                          7.9    10.27 )-----------( 122      ( 28 May 2019 02:02 )             23.3       CBC:  05-28 @ 02:02  WBC 10.27   Hgb 7.9   Hct 23.3   Plts 122  MCV 95.9  05-26 @ 07:34  WBC 12.30   Hgb 6.5   Hct 19.2   Plts 103  .0  05-25 @ 21:58  WBC 13.97   Hgb 7.0   Hct 20.9   Plts 102  MCV 98.1  05-25 @ 07:18  WBC 11.38   Hgb 6.8   Hct 20.4   Plts 107  MCV 99.0  05-24 @ 16:22  WBC 14.40   Hgb 6.3   Hct 19.2   Plts 113  .5  05-23 @ 08:46  WBC 13.29   Hgb 7.6   Hct 23.7   Plts 143  MCV 98.3      Chemistry:  05-28 @ 07:43  Na+ 140  K+ 3.8  Cl- 102  CO2 28  BUN 30  Cr 1.24     05-25 @ 11:51  Na+ 140  K+ 3.7  Cl- 103  CO2 26  BUN 32  Cr 1.21     05-23 @ 08:46  Na+ 138  K+ 3.2  Cl- 101  CO2 29  BUN 30  Cr 1.20         Glucose, Serum: 147 mg/dL (05-28 @ 07:43)  Glucose, Serum: 157 mg/dL (05-25 @ 11:51)  Glucose, Serum: 133 mg/dL (05-23 @ 08:46)      28 May 2019 07:43    140    |  102    |  30     ----------------------------<  147    3.8     |  28     |  1.24   25 May 2019 11:51    140    |  103    |  32     ----------------------------<  157    3.7     |  26     |  1.21   23 May 2019 08:46    138    |  101    |  30     ----------------------------<  133    3.2     |  29     |  1.20     Ca    8.1        28 May 2019 07:43  Ca    7.9        25 May 2019 11:51  Ca    8.3        23 May 2019 08:46    TPro  6.9    /  Alb  1.9    /  TBili  8.0    /  DBili  3.58   /  AST  77     /  ALT  29     /  AlkPhos  151    29 May 2019 07:04  TPro  7.1    /  Alb  2.0    /  TBili  7.9    /  DBili  3.60   /  AST  82     /  ALT  29     /  AlkPhos  173    28 May 2019 07:43  TPro  6.5    /  Alb  1.8    /  TBili  5.7    /  DBili  3.05   /  AST  75     /  ALT  26     /  AlkPhos  194    26 May 2019 07:34  TPro  6.7    /  Alb  1.8    /  TBili  6.2    /  DBili  3.06   /  AST  76     /  ALT  24     /  AlkPhos  151    25 May 2019 07:18  TPro  7.3    /  Alb  1.9    /  TBili  6.0    /  DBili  x      /  AST  80     /  ALT  27     /  AlkPhos  137    23 May 2019 08:46              CAPILLARY BLOOD GLUCOSE              RADIOLOGY & ADDITIONAL TESTS:    Imaging Personally Reviewed:  [ ] YES  [ ] NO    Consultant(s) Notes Reviewed:  [ ] YES  [ ] NO    Care Discussed with Consultants/Other Providers [ ] YES  [ ] NO

## 2019-05-29 NOTE — PROGRESS NOTE ADULT - PROBLEM SELECTOR PLAN 4
wbc count ok no fever however extensive cellulitis. ID changed ABX to Zyvox 600 bid for 5 more days.

## 2019-05-29 NOTE — PROGRESS NOTE ADULT - ASSESSMENT
53 yo AAM  with h/o HTN and ETOH abuse brought in by wife for generalized weakness and dark urine; pt  found to have lactic acidosis (8.8), possible RICHAR with Hb 2.9 and repeat 2.4. In the ER pt was hemodynamically stable    Patient improving. Some recovery of H/H following a total of 9 UNITS of PRBC  this admission.  Hematologic derangement's are most likely multifactorial: Acute blood loss anemia into left leg Hematoma, Active hemalosis, Poor clotting factor synthesis secondary to cirrhosis of the liver   Appreciate Hematology, Vascular, Renal, GI, ID consults as this is a multi desplinary case       CT scan reviewed no signs of internal bleeding. Continue to monitor      HGB stable  7.9 on 5/28/19.

## 2019-05-29 NOTE — PROGRESS NOTE ADULT - PROBLEM SELECTOR PLAN 2
unsure further tests pending  s/p 3 day course of steroids prednisone 20 mg bid.   continue to follow cbc daily.

## 2019-05-29 NOTE — PROGRESS NOTE ADULT - SUBJECTIVE AND OBJECTIVE BOX
Patient is a 54y old  Male who presents with a chief complaint of anemia/ (29 May 2019 12:31)      INTERVAL HPI/OVERNIGHT EVENTS: no events     MEDICATIONS  (STANDING):  buMETAnide 1 milliGRAM(s) Oral every 12 hours  chlorhexidine 4% Liquid 1 Application(s) Topical <User Schedule>  folic acid 1 milliGRAM(s) Oral daily  lactulose Syrup 10 Gram(s) Oral two times a day  linezolid    Tablet 600 milliGRAM(s) Oral every 12 hours  multivitamin 1 Tablet(s) Oral daily  pantoprazole    Tablet 40 milliGRAM(s) Oral before breakfast  predniSONE   Tablet 20 milliGRAM(s) Oral three times a day    MEDICATIONS  (PRN):  acetaminophen   Tablet .. 650 milliGRAM(s) Oral every 6 hours PRN Temp greater or equal to 38C (100.4F), Mild Pain (1 - 3)  oxyCODONE    5 mG/acetaminophen 325 mG 1 Tablet(s) Oral every 6 hours PRN Moderate Pain (4 - 6)  polyethylene glycol 3350 17 Gram(s) Oral daily PRN Constipation      Allergies    No Known Allergies    Intolerances       Vital Signs Last 24 Hrs  T(C): 36.4 (29 May 2019 11:48), Max: 37.2 (28 May 2019 23:14)  T(F): 97.6 (29 May 2019 11:48), Max: 99 (28 May 2019 23:14)  HR: 88 (29 May 2019 11:48) (75 - 88)  BP: 104/65 (29 May 2019 11:48) (104/65 - 127/69)  BP(mean): --  RR: 17 (29 May 2019 11:48) (17 - 18)  SpO2: 99% (29 May 2019 11:48) (95% - 99%)    PHYSICAL EXAM:  GENERAL: NAD, well-groomed, well-developed  HEAD:  Atraumatic, Normocephalic  EYES: EOMI, PERRLA, conjunctiva and sclera clear  ENMT: No tonsillar erythema, exudates, or enlargement; Moist mucous membranes, Good dentition, No lesions  NECK: Supple, No JVD, Normal thyroid  NERVOUS SYSTEM:  Alert & Oriented X3, Good concentration; Motor Strength 5/5 B/L upper and lower extremities; DTRs 2+ intact and symmetric  CHEST/LUNG: Clear to percussion bilaterally; No rales, rhonchi, wheezing, or rubs  HEART: Regular rate and rhythm; No murmurs, rubs, or gallops  ABDOMEN: Soft, Nontender, Nondistended; Bowel sounds present decreased ascitis Lumbar back hematoma   EXTREMITIES:  2+ Peripheral Pulses,  b/l edema   SKIN:  jaundice poor toenail care     LABS:                        7.9    10.27 )-----------( 122      ( 28 May 2019 02:02 )             23.3     05-28    140  |  102  |  30<H>  ----------------------------<  147<H>  3.8   |  28  |  1.24    Ca    8.1<L>      28 May 2019 07:43    TPro  6.9  /  Alb  1.9<L>  /  TBili  8.0<H>  /  DBili  3.58<H>  /  AST  77<H>  /  ALT  29  /  AlkPhos  151<H>  05-29        CAPILLARY BLOOD GLUCOSE          RADIOLOGY & ADDITIONAL TESTS:    Imaging Personally Reviewed:  [ X] YES  [ ] NO    Consultant(s) Notes Reviewed:  [ X] YES  [ ] NO    Care Discussed with Consultants/Other Providers [X ] YES  [ ] NO

## 2019-05-29 NOTE — PROGRESS NOTE ADULT - PROBLEM SELECTOR PLAN 1
Awaiting transfer to [facility] for Further work up, management, including work-up of cirrhosis/transplant center  - Anemia - some evidence of possible hemolysis - cirrhosis related ( e.g Spurr cell, Zieve's syndrome, other cause) - for transfer to [facility] for further work-up  - Abx for h/o cellulitis on linezolid - watch platelet count  - renal f/u for Renal failure.   - follow CBC

## 2019-05-29 NOTE — PROGRESS NOTE ADULT - PROBLEM SELECTOR PLAN 2
5.7  / 73 / 24 / 144  ==> 6.0 / 80 / 27 / 137 ==> 6.2 / 76 / 24 / 151 ==> 5.7 / 75 / 26 / 194 ,  ==> 7.9 / 82 / 29 / 173 ==> 8.0 / 77 / 29 / 151 elevated LFTS probably secondary to , meds ( eg  antibiotics   ) and underlying cirrhosis . Viral studies negative. CT scan / Sono [id number] essentially negative.  On lactulose. ASMA 1:80 AMA < 1:20  YARED negative  Fe/TIBC/ Ferritin 62 / 148 / 129 ( essentially negative )  Viral studies negative. Recent acute rise in bilirubin probably related to Linezolid. D/C ?

## 2019-05-29 NOTE — PROGRESS NOTE ADULT - SUBJECTIVE AND OBJECTIVE BOX
patient states feeling better    Vital Signs Last 24 Hrs  T(C): 36.8 (29 May 2019 05:00), Max: 37.2 (28 May 2019 23:14)  T(F): 98.2 (29 May 2019 05:00), Max: 99 (28 May 2019 23:14)  HR: 77 (29 May 2019 05:00) (75 - 78)  BP: 119/65 (29 May 2019 05:00) (117/52 - 127/69)  BP(mean): --  RR: 18 (29 May 2019 05:00) (17 - 18)  SpO2: 95% (29 May 2019 05:00) (95% - 100%)    PHYSICAL EXAM:    general - AAO x 3  HEENT - Icterus +  CVS - RRR  RS - AE B/L  Abd - soft, NT  Ext - Pulses +        LABS:                        7.9    10.27 )-----------( 122      ( 28 May 2019 02:02 )             23.3     05-28    140  |  102  |  30<H>  ----------------------------<  147<H>  3.8   |  28  |  1.24    Ca    8.1<L>      28 May 2019 07:43    TPro  6.9  /  Alb  1.9<L>  /  TBili  8.0<H>  /  DBili  3.58<H>  /  AST  77<H>  /  ALT  29  /  AlkPhos  151<H>  05-29            RADIOLOGY & ADDITIONAL STUDIES:

## 2019-05-30 LAB
BASOPHILS # BLD AUTO: 0.01 K/UL — SIGNIFICANT CHANGE UP (ref 0–0.2)
BASOPHILS NFR BLD AUTO: 0.1 % — SIGNIFICANT CHANGE UP (ref 0–2)
EOSINOPHIL # BLD AUTO: 0.02 K/UL — SIGNIFICANT CHANGE UP (ref 0–0.5)
EOSINOPHIL NFR BLD AUTO: 0.2 % — SIGNIFICANT CHANGE UP (ref 0–6)
HCT VFR BLD CALC: 26.5 % — LOW (ref 39–50)
HGB BLD-MCNC: 8.8 G/DL — LOW (ref 13–17)
IMM GRANULOCYTES NFR BLD AUTO: 0.9 % — SIGNIFICANT CHANGE UP (ref 0–1.5)
LYMPHOCYTES # BLD AUTO: 0.61 K/UL — LOW (ref 1–3.3)
LYMPHOCYTES # BLD AUTO: 7 % — LOW (ref 13–44)
MCHC RBC-ENTMCNC: 32.4 PG — SIGNIFICANT CHANGE UP (ref 27–34)
MCHC RBC-ENTMCNC: 33.2 GM/DL — SIGNIFICANT CHANGE UP (ref 32–36)
MCV RBC AUTO: 97.4 FL — SIGNIFICANT CHANGE UP (ref 80–100)
MONOCYTES # BLD AUTO: 1.02 K/UL — HIGH (ref 0–0.9)
MONOCYTES NFR BLD AUTO: 11.8 % — SIGNIFICANT CHANGE UP (ref 2–14)
NEUTROPHILS # BLD AUTO: 6.92 K/UL — SIGNIFICANT CHANGE UP (ref 1.8–7.4)
NEUTROPHILS NFR BLD AUTO: 80 % — HIGH (ref 43–77)
NRBC # BLD: 0 /100 WBCS — SIGNIFICANT CHANGE UP (ref 0–0)
PLATELET # BLD AUTO: 128 K/UL — LOW (ref 150–400)
RBC # BLD: 2.72 M/UL — LOW (ref 4.2–5.8)
RBC # FLD: 25.6 % — HIGH (ref 10.3–14.5)
WBC # BLD: 8.66 K/UL — SIGNIFICANT CHANGE UP (ref 3.8–10.5)
WBC # FLD AUTO: 8.66 K/UL — SIGNIFICANT CHANGE UP (ref 3.8–10.5)

## 2019-05-30 PROCEDURE — 99232 SBSQ HOSP IP/OBS MODERATE 35: CPT

## 2019-05-30 RX ADMIN — PANTOPRAZOLE SODIUM 40 MILLIGRAM(S): 20 TABLET, DELAYED RELEASE ORAL at 08:05

## 2019-05-30 RX ADMIN — Medication 20 MILLIGRAM(S): at 13:32

## 2019-05-30 RX ADMIN — Medication 20 MILLIGRAM(S): at 06:04

## 2019-05-30 RX ADMIN — BUMETANIDE 1 MILLIGRAM(S): 0.25 INJECTION INTRAMUSCULAR; INTRAVENOUS at 06:04

## 2019-05-30 RX ADMIN — Medication 1 MILLIGRAM(S): at 11:05

## 2019-05-30 RX ADMIN — LACTULOSE 10 GRAM(S): 10 SOLUTION ORAL at 06:03

## 2019-05-30 RX ADMIN — CHLORHEXIDINE GLUCONATE 1 APPLICATION(S): 213 SOLUTION TOPICAL at 06:04

## 2019-05-30 RX ADMIN — Medication 1 TABLET(S): at 11:05

## 2019-05-30 RX ADMIN — BUMETANIDE 1 MILLIGRAM(S): 0.25 INJECTION INTRAMUSCULAR; INTRAVENOUS at 17:11

## 2019-05-30 RX ADMIN — Medication 20 MILLIGRAM(S): at 21:21

## 2019-05-30 RX ADMIN — LACTULOSE 10 GRAM(S): 10 SOLUTION ORAL at 17:11

## 2019-05-30 RX ADMIN — LINEZOLID 600 MILLIGRAM(S): 600 INJECTION, SOLUTION INTRAVENOUS at 06:04

## 2019-05-30 NOTE — PROGRESS NOTE ADULT - PROBLEM SELECTOR PLAN 2
5.7  / 73 / 24 / 144  ==> 6.0 / 80 / 27 / 137 ==> 6.2 / 76 / 24 / 151 ==> 5.7 / 75 / 26 / 194 ,  ==> 7.9 / 82 / 29 / 173 ==> 8.0 / 77 / 29 / 151 none today elevated LFTS probably secondary to , meds ( eg  antibiotics   ) and underlying cirrhosis . Viral studies negative. CT scan / Sono [id number] essentially negative.  On lactulose. ASMA 1:80 AMA < 1:20  YARED negative  Fe/TIBC/ Ferritin 62 / 148 / 129 ( essentially negative )  Viral studies negative. Recent acute rise in bilirubin probably related to Linezolid. D/C ?

## 2019-05-30 NOTE — PROGRESS NOTE ADULT - ASSESSMENT
53 yo AAM  with h/o HTN and ETOH abuse brought in by wife for generalized weakness and dark urine; pt  found to have lactic acidosis (8.8), possible RICHAR with Hb 2.9 and repeat 2.4. In the ER pt was hemodynamically stable    Patient improving. Some recovery of H/H following a total of 9 UNITS of PRBC  this admission.  Hematologic derangement's are most likely multifactorial: Acute blood loss anemia into left leg Hematoma, Active hemalosis, Poor clotting factor synthesis secondary to cirrhosis of the liver   Appreciate Hematology, Vascular, Renal, GI, ID consults as this is a multi desplinary case       CT scan reviewed no signs of internal bleeding. Continue to monitor      HGB stable

## 2019-05-30 NOTE — PROGRESS NOTE ADULT - SUBJECTIVE AND OBJECTIVE BOX
Patient is a 54y old  Male who presents with a chief complaint of anemia/ (30 May 2019 12:12)      HPI:  54 year old M HTN and ETOH abuse brought in by wife for generalized weakness and dark urine.  Wife reports patient has been having non-bloody non-bilious vomiting last week which stopped his drinking on Friday 5/3.  Wife also notes he has been having easy bruising and L leg swelling over the same amount of time.  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  Wife subsequently added that he has been having weakness and fatigue since early March with episodes of gum bleeding going back to early January.  Of note, wife noted that his skin color has changed over the past week.  Pt denies fevers, chills, eye pain vision changes, (08 May 2019 23:01)      INTERVAL HPI/OVERNIGHT EVENTS:  The patient denies melena, hematochezia, hematemesis, nausea, vomiting, abdominal pain, constipation, diarrhea, or change in bowel movements     MEDICATIONS  (STANDING):  buMETAnide 1 milliGRAM(s) Oral every 12 hours  chlorhexidine 4% Liquid 1 Application(s) Topical <User Schedule>  folic acid 1 milliGRAM(s) Oral daily  lactulose Syrup 10 Gram(s) Oral two times a day  multivitamin 1 Tablet(s) Oral daily  pantoprazole    Tablet 40 milliGRAM(s) Oral before breakfast  predniSONE   Tablet 20 milliGRAM(s) Oral three times a day    MEDICATIONS  (PRN):  acetaminophen   Tablet .. 650 milliGRAM(s) Oral every 6 hours PRN Temp greater or equal to 38C (100.4F), Mild Pain (1 - 3)  oxyCODONE    5 mG/acetaminophen 325 mG 1 Tablet(s) Oral every 6 hours PRN Moderate Pain (4 - 6)  polyethylene glycol 3350 17 Gram(s) Oral daily PRN Constipation      FAMILY HISTORY:      Allergies    No Known Allergies    Intolerances        PMH/PSH:  Benign essential HTN        REVIEW OF SYSTEMS:  CONSTITUTIONAL: No fever, weight loss, or fatigue  RESPIRATORY: No cough, wheezing, chills or hemoptysis; No shortness of breath  CARDIOVASCULAR: No chest pain, palpitations, dizziness, or leg swelling  GASTROINTESTINAL: No abdominal or epigastric pain. No nausea, vomiting, or hematemesis; No diarrhea or constipation. No melena or hematochezia.      Vital Signs Last 24 Hrs  T(C): 37.1 (30 May 2019 11:47), Max: 37.1 (29 May 2019 17:34)  T(F): 98.7 (30 May 2019 11:47), Max: 98.8 (29 May 2019 17:34)  HR: 17 (30 May 2019 11:47) (17 - 86)  BP: 99/57 (30 May 2019 11:47) (99/57 - 139/64)  BP(mean): --  RR: 17 (30 May 2019 11:47) (17 - 18)  SpO2: 99% (30 May 2019 11:47) (95% - 99%)    PHYSICAL EXAM:  GENERAL: NAD, well-groomed, well-developed  CHEST/LUNG: Clear to percussion bilaterally; No rales, rhonchi, wheezing, or rubs  HEART: Regular rate and rhythm; No murmurs, rubs, or gallops  ABDOMEN: Soft, Nontender, Nondistended; Bowel sounds present      LAB                          8.8    8.66  )-----------( 128      ( 30 May 2019 07:23 )             26.5       CBC:  05-30 @ 07:23  WBC 8.66   Hgb 8.8   Hct 26.5   Plts 128  MCV 97.4  05-28 @ 02:02  WBC 10.27   Hgb 7.9   Hct 23.3   Plts 122  MCV 95.9  05-26 @ 07:34  WBC 12.30   Hgb 6.5   Hct 19.2   Plts 103  .0  05-25 @ 21:58  WBC 13.97   Hgb 7.0   Hct 20.9   Plts 102  MCV 98.1  05-25 @ 07:18  WBC 11.38   Hgb 6.8   Hct 20.4   Plts 107  MCV 99.0  05-24 @ 16:22  WBC 14.40   Hgb 6.3   Hct 19.2   Plts 113  .5      Chemistry:  05-28 @ 07:43  Na+ 140  K+ 3.8  Cl- 102  CO2 28  BUN 30  Cr 1.24     05-25 @ 11:51  Na+ 140  K+ 3.7  Cl- 103  CO2 26  BUN 32  Cr 1.21         Glucose, Serum: 147 mg/dL (05-28 @ 07:43)  Glucose, Serum: 157 mg/dL (05-25 @ 11:51)      28 May 2019 07:43    140    |  102    |  30     ----------------------------<  147    3.8     |  28     |  1.24   25 May 2019 11:51    140    |  103    |  32     ----------------------------<  157    3.7     |  26     |  1.21     Ca    8.1        28 May 2019 07:43  Ca    7.9        25 May 2019 11:51    TPro  6.9    /  Alb  1.9    /  TBili  8.0    /  DBili  3.58   /  AST  77     /  ALT  29     /  AlkPhos  151    29 May 2019 07:04  TPro  7.1    /  Alb  2.0    /  TBili  7.9    /  DBili  3.60   /  AST  82     /  ALT  29     /  AlkPhos  173    28 May 2019 07:43  TPro  6.5    /  Alb  1.8    /  TBili  5.7    /  DBili  3.05   /  AST  75     /  ALT  26     /  AlkPhos  194    26 May 2019 07:34  TPro  6.7    /  Alb  1.8    /  TBili  6.2    /  DBili  3.06   /  AST  76     /  ALT  24     /  AlkPhos  151    25 May 2019 07:18              CAPILLARY BLOOD GLUCOSE              RADIOLOGY & ADDITIONAL TESTS:    Imaging Personally Reviewed:  [ ] YES  [ ] NO    Consultant(s) Notes Reviewed:  [ ] YES  [ ] NO    Care Discussed with Consultants/Other Providers [ ] YES  [ ] NO

## 2019-05-30 NOTE — PROGRESS NOTE ADULT - SUBJECTIVE AND OBJECTIVE BOX
Patient lying in bed, feels weak, awaiting transfer to [facility]    Vital Signs Last 24 Hrs  T(C): 36.9 (30 May 2019 05:15), Max: 37.1 (29 May 2019 17:34)  T(F): 98.4 (30 May 2019 05:15), Max: 98.8 (29 May 2019 17:34)  HR: 74 (30 May 2019 05:15) (74 - 88)  BP: 127/67 (30 May 2019 05:15) (103/58 - 139/64)  BP(mean): --  RR: 17 (30 May 2019 05:15) (17 - 18)  SpO2: 98% (30 May 2019 05:15) (95% - 99%)    PHYSICAL EXAM:    general - AAO x 3  HEENT - Icterus +  CVS - RRR  RS - AE B/L  Abd - soft, NT  Ext - Pulses +        LABS:                        8.8    8.66  )-----------( 128      ( 30 May 2019 07:23 )             26.5         TPro  6.9  /  Alb  1.9<L>  /  TBili  8.0<H>  /  DBili  3.58<H>  /  AST  77<H>  /  ALT  29  /  AlkPhos  151<H>  05-29            RADIOLOGY & ADDITIONAL STUDIES:

## 2019-05-30 NOTE — PROGRESS NOTE ADULT - SUBJECTIVE AND OBJECTIVE BOX
Patient is a 54y old  Male who presents with a chief complaint of anemia/ (30 May 2019 11:35)      INTERVAL HPI/OVERNIGHT EVENTS:    MEDICATIONS  (STANDING):  buMETAnide 1 milliGRAM(s) Oral every 12 hours  chlorhexidine 4% Liquid 1 Application(s) Topical <User Schedule>  folic acid 1 milliGRAM(s) Oral daily  lactulose Syrup 10 Gram(s) Oral two times a day  multivitamin 1 Tablet(s) Oral daily  pantoprazole    Tablet 40 milliGRAM(s) Oral before breakfast  predniSONE   Tablet 20 milliGRAM(s) Oral three times a day    MEDICATIONS  (PRN):  acetaminophen   Tablet .. 650 milliGRAM(s) Oral every 6 hours PRN Temp greater or equal to 38C (100.4F), Mild Pain (1 - 3)  oxyCODONE    5 mG/acetaminophen 325 mG 1 Tablet(s) Oral every 6 hours PRN Moderate Pain (4 - 6)  polyethylene glycol 3350 17 Gram(s) Oral daily PRN Constipation      Allergies    No Known Allergies    Intolerances        REVIEW OF SYSTEMS:  CONSTITUTIONAL: No fever, weight loss, or fatigue  EYES: No eye pain, visual disturbances, or discharge  ENMT:  No difficulty hearing, tinnitus, vertigo; No sinus or throat pain  NECK: No pain or stiffness  BREASTS: No pain, masses, or nipple discharge  RESPIRATORY: No cough, wheezing, chills or hemoptysis; No shortness of breath  CARDIOVASCULAR: No chest pain, palpitations, dizziness, or leg swelling  GASTROINTESTINAL: No abdominal or epigastric pain. No nausea, vomiting, or hematemesis; No diarrhea or constipation. No melena or hematochezia.  GENITOURINARY: No dysuria, frequency, hematuria, or incontinence  NEUROLOGICAL: No headaches, memory loss, loss of strength, numbness, or tremors  SKIN: No itching, burning, rashes, or lesions   LYMPH NODES: No enlarged glands  ENDOCRINE: No heat or cold intolerance; No hair loss  MUSCULOSKELETAL: No joint pain or swelling; No muscle, back, or extremity pain  PSYCHIATRIC: No depression, anxiety, mood swings, or difficulty sleeping  HEME/LYMPH: No easy bruising, or bleeding gums  ALLERGY AND IMMUNOLOGIC: No hives or eczema    Vital Signs Last 24 Hrs  T(C): 37.1 (30 May 2019 11:47), Max: 37.1 (29 May 2019 17:34)  T(F): 98.7 (30 May 2019 11:47), Max: 98.8 (29 May 2019 17:34)  HR: 17 (30 May 2019 11:47) (17 - 86)  BP: 99/57 (30 May 2019 11:47) (99/57 - 139/64)  BP(mean): --  RR: 17 (30 May 2019 11:47) (17 - 18)  SpO2: 99% (30 May 2019 11:47) (95% - 99%)    PHYSICAL EXAM:  GENERAL: NAD, well-groomed, well-developed  HEAD:  Atraumatic, Normocephalic  EYES: EOMI, PERRLA, conjunctiva and sclera clear  ENMT: No tonsillar erythema, exudates, or enlargement; Moist mucous membranes, Good dentition, No lesions  NECK: Supple, No JVD, Normal thyroid  NERVOUS SYSTEM:  Alert & Oriented X3, Good concentration; Motor Strength 5/5 B/L upper and lower extremities; DTRs 2+ intact and symmetric  CHEST/LUNG: Clear to percussion bilaterally; No rales, rhonchi, wheezing, or rubs  HEART: Regular rate and rhythm; No murmurs, rubs, or gallops  ABDOMEN: Soft, Nontender, Nondistended; Bowel sounds present decreased ascitis Lumbar back hematoma   EXTREMITIES:  2+ Peripheral Pulses,  b/l edema   SKIN:  jaundice poor toenail care   LABS:                        8.8    8.66  )-----------( 128      ( 30 May 2019 07:23 )             26.5         TPro  6.9  /  Alb  1.9<L>  /  TBili  8.0<H>  /  DBili  3.58<H>  /  AST  77<H>  /  ALT  29  /  AlkPhos  151<H>  05-29        CAPILLARY BLOOD GLUCOSE          RADIOLOGY & ADDITIONAL TESTS:    Imaging Personally Reviewed:  [ X] YES  [ ] NO    Consultant(s) Notes Reviewed:  [ X] YES  [ ] NO    Care Discussed with Consultants/Other Providers [X ] YES  [ ] NO

## 2019-05-30 NOTE — PROGRESS NOTE ADULT - PROBLEM SELECTOR PLAN 1
- Anemia - H/H stable, though bilirubin rising  - Patient awaiting transfer to [facility], for higher level of care, transplant center where they have more expertise - patient was accepted  - Supportive care, watch H/H - Anemia - H/H stable, though bilirubin rising  - Patient awaiting transfer to [facility], for higher level of care, transplant center where they have more expertise - patient was accepted  ome evidence of possible hemolysis - cirrhosis related ( e.g Spurr cell, Zieve's syndrome, other cause) - for transfer to [facility] for further work-up    - Supportive care, watch H/H

## 2019-05-31 VITALS
RESPIRATION RATE: 17 BRPM | SYSTOLIC BLOOD PRESSURE: 136 MMHG | TEMPERATURE: 99 F | OXYGEN SATURATION: 98 % | DIASTOLIC BLOOD PRESSURE: 72 MMHG | HEART RATE: 89 BPM

## 2019-05-31 LAB
ALBUMIN SERPL ELPH-MCNC: 1.9 G/DL — LOW (ref 3.3–5)
ALP SERPL-CCNC: 170 U/L — HIGH (ref 40–120)
ALT FLD-CCNC: 32 U/L — SIGNIFICANT CHANGE UP (ref 12–78)
ANION GAP SERPL CALC-SCNC: 8 MMOL/L — SIGNIFICANT CHANGE UP (ref 5–17)
AST SERPL-CCNC: 77 U/L — HIGH (ref 15–37)
BILIRUB SERPL-MCNC: 7.2 MG/DL — HIGH (ref 0.2–1.2)
BUN SERPL-MCNC: 31 MG/DL — HIGH (ref 7–23)
CALCIUM SERPL-MCNC: 8.4 MG/DL — LOW (ref 8.5–10.1)
CHLORIDE SERPL-SCNC: 97 MMOL/L — SIGNIFICANT CHANGE UP (ref 96–108)
CO2 SERPL-SCNC: 30 MMOL/L — SIGNIFICANT CHANGE UP (ref 22–31)
CREAT SERPL-MCNC: 1.24 MG/DL — SIGNIFICANT CHANGE UP (ref 0.5–1.3)
GLUCOSE SERPL-MCNC: 153 MG/DL — HIGH (ref 70–99)
HCT VFR BLD CALC: 26.3 % — LOW (ref 39–50)
HGB BLD-MCNC: 8.7 G/DL — LOW (ref 13–17)
MCHC RBC-ENTMCNC: 32.5 PG — SIGNIFICANT CHANGE UP (ref 27–34)
MCHC RBC-ENTMCNC: 33.1 GM/DL — SIGNIFICANT CHANGE UP (ref 32–36)
MCV RBC AUTO: 98.1 FL — SIGNIFICANT CHANGE UP (ref 80–100)
NRBC # BLD: 0 /100 WBCS — SIGNIFICANT CHANGE UP (ref 0–0)
PLATELET # BLD AUTO: 118 K/UL — LOW (ref 150–400)
POTASSIUM SERPL-MCNC: 3.7 MMOL/L — SIGNIFICANT CHANGE UP (ref 3.5–5.3)
POTASSIUM SERPL-SCNC: 3.7 MMOL/L — SIGNIFICANT CHANGE UP (ref 3.5–5.3)
PROT SERPL-MCNC: 6.9 GM/DL — SIGNIFICANT CHANGE UP (ref 6–8.3)
RBC # BLD: 2.68 M/UL — LOW (ref 4.2–5.8)
RBC # FLD: 25.1 % — HIGH (ref 10.3–14.5)
SODIUM SERPL-SCNC: 135 MMOL/L — SIGNIFICANT CHANGE UP (ref 135–145)
WBC # BLD: 9.04 K/UL — SIGNIFICANT CHANGE UP (ref 3.8–10.5)
WBC # FLD AUTO: 9.04 K/UL — SIGNIFICANT CHANGE UP (ref 3.8–10.5)

## 2019-05-31 PROCEDURE — 99232 SBSQ HOSP IP/OBS MODERATE 35: CPT

## 2019-05-31 RX ORDER — BUMETANIDE 0.25 MG/ML
1 INJECTION INTRAMUSCULAR; INTRAVENOUS
Qty: 60 | Refills: 0
Start: 2019-05-31 | End: 2019-06-29

## 2019-05-31 RX ORDER — FOLIC ACID 0.8 MG
1 TABLET ORAL
Qty: 30 | Refills: 0
Start: 2019-05-31 | End: 2019-06-29

## 2019-05-31 RX ORDER — PANTOPRAZOLE SODIUM 20 MG/1
1 TABLET, DELAYED RELEASE ORAL
Qty: 30 | Refills: 0
Start: 2019-05-31 | End: 2019-06-29

## 2019-05-31 RX ADMIN — BUMETANIDE 1 MILLIGRAM(S): 0.25 INJECTION INTRAMUSCULAR; INTRAVENOUS at 05:19

## 2019-05-31 RX ADMIN — Medication 20 MILLIGRAM(S): at 14:28

## 2019-05-31 RX ADMIN — PANTOPRAZOLE SODIUM 40 MILLIGRAM(S): 20 TABLET, DELAYED RELEASE ORAL at 05:19

## 2019-05-31 RX ADMIN — Medication 1 TABLET(S): at 14:28

## 2019-05-31 RX ADMIN — LACTULOSE 10 GRAM(S): 10 SOLUTION ORAL at 05:22

## 2019-05-31 RX ADMIN — CHLORHEXIDINE GLUCONATE 1 APPLICATION(S): 213 SOLUTION TOPICAL at 05:15

## 2019-05-31 RX ADMIN — Medication 1 MILLIGRAM(S): at 14:29

## 2019-05-31 RX ADMIN — Medication 20 MILLIGRAM(S): at 05:19

## 2019-05-31 NOTE — PROGRESS NOTE ADULT - PROBLEM SELECTOR PROBLEM 4
Hematoma
Cellulitis of left lower extremity
ETOH abuse
ETOH abuse
Cellulitis of left lower extremity
ETOH abuse
Hematoma
Cellulitis of left lower extremity

## 2019-05-31 NOTE — PROGRESS NOTE ADULT - PROBLEM SELECTOR PROBLEM 1
Cellulitis of left lower extremity
Acute blood loss anemia
Anemia, unspecified type
Acquired hemolytic anemia
Acute blood loss anemia
Anemia, unspecified type
Anemia, unspecified type
Ascites due to alcoholic cirrhosis
Ascites due to alcoholic cirrhosis
Cellulitis of left lower extremity
Acquired hemolytic anemia
Acquired hemolytic anemia
Acute blood loss anemia
Anemia, unspecified type
Cellulitis of left lower extremity
Cellulitis of left lower extremity
Acute blood loss anemia
Anemia, unspecified type
Acute blood loss anemia

## 2019-05-31 NOTE — PROGRESS NOTE ADULT - PROBLEM SELECTOR PROBLEM 5
Ascites due to alcoholic cirrhosis

## 2019-05-31 NOTE — PROGRESS NOTE ADULT - SUBJECTIVE AND OBJECTIVE BOX
Patient is a 54y old  Male who presents with a chief complaint of anemia/ (31 May 2019 12:04)      HPI:  54 year old M HTN and ETOH abuse brought in by wife for generalized weakness and dark urine.  Wife reports patient has been having non-bloody non-bilious vomiting last week which stopped his drinking on Friday 5/3.  Wife also notes he has been having easy bruising and L leg swelling over the same amount of time.  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  Wife subsequently added that he has been having weakness and fatigue since early March with episodes of gum bleeding going back to early January.  Of note, wife noted that his skin color has changed over the past week.  Pt denies fevers, chills, eye pain vision changes, (08 May 2019 23:01)      INTERVAL HPI/OVERNIGHT EVENTS:  The patient denies melena, hematochezia, hematemesis, nausea, vomiting, abdominal pain, constipation, diarrhea, or change in bowel movements Tolerating regular diet.    MEDICATIONS  (STANDING):  buMETAnide 1 milliGRAM(s) Oral every 12 hours  chlorhexidine 4% Liquid 1 Application(s) Topical <User Schedule>  folic acid 1 milliGRAM(s) Oral daily  lactulose Syrup 10 Gram(s) Oral two times a day  multivitamin 1 Tablet(s) Oral daily  pantoprazole    Tablet 40 milliGRAM(s) Oral before breakfast  predniSONE   Tablet 20 milliGRAM(s) Oral three times a day    MEDICATIONS  (PRN):  acetaminophen   Tablet .. 650 milliGRAM(s) Oral every 6 hours PRN Temp greater or equal to 38C (100.4F), Mild Pain (1 - 3)  oxyCODONE    5 mG/acetaminophen 325 mG 1 Tablet(s) Oral every 6 hours PRN Moderate Pain (4 - 6)  polyethylene glycol 3350 17 Gram(s) Oral daily PRN Constipation      FAMILY HISTORY:      Allergies    No Known Allergies    Intolerances        PMH/PSH:  Benign essential HTN        REVIEW OF SYSTEMS:  CONSTITUTIONAL: No fever, weight loss, or fatigue  EYES: No eye pain, visual disturbances, or discharge  ENMT:  No difficulty hearing, tinnitus, vertigo; No sinus or throat pain  NECK: No pain or stiffness  BREASTS: No pain, masses, or nipple discharge  RESPIRATORY: No cough, wheezing, chills or hemoptysis; No shortness of breath  CARDIOVASCULAR: No chest pain, palpitations, dizziness, or leg swelling  GASTROINTESTINAL: See above  GENITOURINARY: No dysuria, frequency, hematuria, or incontinence  NEUROLOGICAL: No headaches, memory loss, loss of strength, numbness, or tremors  SKIN: No itching, burning, rashes, or lesions   LYMPH NODES: No enlarged glands  ENDOCRINE: No heat or cold intolerance; No hair loss  MUSCULOSKELETAL: No joint pain or swelling; No muscle, back, or extremity pain  PSYCHIATRIC: No depression, anxiety, mood swings, or difficulty sleeping  HEME/LYMPH: No easy bruising, or bleeding gums  ALLERGY AND IMMUNOLOGIC: No hives or eczema    Vital Signs Last 24 Hrs  T(C): 37.4 (31 May 2019 13:00), Max: 37.4 (30 May 2019 23:34)  T(F): 99.4 (31 May 2019 13:00), Max: 99.4 (31 May 2019 13:00)  HR: 89 (31 May 2019 13:00) (68 - 100)  BP: 136/72 (31 May 2019 13:00) (123/77 - 148/76)  BP(mean): --  RR: 17 (31 May 2019 13:00) (17 - 18)  SpO2: 98% (31 May 2019 13:00) (97% - 98%)    PHYSICAL EXAM:  GENERAL: NAD, well-groomed, well-developed  HEAD:  Atraumatic, Normocephalic  EYES: EOMI, PERRLA, conjunctiva and sclera clear  NECK: Supple, No JVD, Normal thyroid  NERVOUS SYSTEM:  Alert & Oriented X3, Good concentration;   CHEST/LUNG: Clear to percussion bilaterally; No rales, rhonchi, wheezing, or rubs  HEART: Regular rate and rhythm; No murmurs, rubs, or gallops  ABDOMEN: Soft, Nontender, Nondistended; Bowel sounds present  EXTREMITIES:  2+ Peripheral Pulses, No clubbing, cyanosis, or edema  LYMPH: No lymphadenopathy noted  SKIN: No rashes or lesions    LAB                          8.7    9.04  )-----------( 118      ( 31 May 2019 06:25 )             26.3       CBC:  05-31 @ 06:25  WBC 9.04   Hgb 8.7   Hct 26.3   Plts 118  MCV 98.1  05-30 @ 07:23  WBC 8.66   Hgb 8.8   Hct 26.5   Plts 128  MCV 97.4  05-28 @ 02:02  WBC 10.27   Hgb 7.9   Hct 23.3   Plts 122  MCV 95.9  05-26 @ 07:34  WBC 12.30   Hgb 6.5   Hct 19.2   Plts 103  .0  05-25 @ 21:58  WBC 13.97   Hgb 7.0   Hct 20.9   Plts 102  MCV 98.1  05-25 @ 07:18  WBC 11.38   Hgb 6.8   Hct 20.4   Plts 107  MCV 99.0      Chemistry:  05-31 @ 06:25  Na+ 135  K+ 3.7  Cl- 97  CO2 30  BUN 31  Cr 1.24     05-28 @ 07:43  Na+ 140  K+ 3.8  Cl- 102  CO2 28  BUN 30  Cr 1.24     05-25 @ 11:51  Na+ 140  K+ 3.7  Cl- 103  CO2 26  BUN 32  Cr 1.21         Glucose, Serum: 153 mg/dL (05-31 @ 06:25)  Glucose, Serum: 147 mg/dL (05-28 @ 07:43)  Glucose, Serum: 157 mg/dL (05-25 @ 11:51)      31 May 2019 06:25    135    |  97     |  31     ----------------------------<  153    3.7     |  30     |  1.24   28 May 2019 07:43    140    |  102    |  30     ----------------------------<  147    3.8     |  28     |  1.24   25 May 2019 11:51    140    |  103    |  32     ----------------------------<  157    3.7     |  26     |  1.21     Ca    8.4        31 May 2019 06:25  Ca    8.1        28 May 2019 07:43  Ca    7.9        25 May 2019 11:51    TPro  6.9    /  Alb  1.9    /  TBili  7.2    /  DBili  3.37   /  AST  77     /  ALT  32     /  AlkPhos  170    31 May 2019 06:25  TPro  6.9    /  Alb  1.9    /  TBili  8.0    /  DBili  3.58   /  AST  77     /  ALT  29     /  AlkPhos  151    29 May 2019 07:04  TPro  7.1    /  Alb  2.0    /  TBili  7.9    /  DBili  3.60   /  AST  82     /  ALT  29     /  AlkPhos  173    28 May 2019 07:43  TPro  6.5    /  Alb  1.8    /  TBili  5.7    /  DBili  3.05   /  AST  75     /  ALT  26     /  AlkPhos  194    26 May 2019 07:34  TPro  6.7    /  Alb  1.8    /  TBili  6.2    /  DBili  3.06   /  AST  76     /  ALT  24     /  AlkPhos  151    25 May 2019 07:18              CAPILLARY BLOOD GLUCOSE              RADIOLOGY & ADDITIONAL TESTS:    Imaging Personally Reviewed:  [ ] YES  [ ] NO    Consultant(s) Notes Reviewed:  [ ] YES  [ ] NO    Care Discussed with Consultants/Other Providers [ ] YES  [ ] NO

## 2019-05-31 NOTE — PROGRESS NOTE ADULT - PROBLEM SELECTOR PLAN 5
paracentesis done no sbp
will order parecentesis
paracentesis done no sbp

## 2019-05-31 NOTE — PROGRESS NOTE ADULT - SUBJECTIVE AND OBJECTIVE BOX
INTERVAL History:  Icterus,  Hemolytic Anemia.    Allergies    No Known Allergies    Intolerances        MEDICATIONS  (STANDING):  buMETAnide 1 milliGRAM(s) Oral every 12 hours  chlorhexidine 4% Liquid 1 Application(s) Topical <User Schedule>  folic acid 1 milliGRAM(s) Oral daily  lactulose Syrup 10 Gram(s) Oral two times a day  multivitamin 1 Tablet(s) Oral daily  pantoprazole    Tablet 40 milliGRAM(s) Oral before breakfast  predniSONE   Tablet 20 milliGRAM(s) Oral three times a day    MEDICATIONS  (PRN):  acetaminophen   Tablet .. 650 milliGRAM(s) Oral every 6 hours PRN Temp greater or equal to 38C (100.4F), Mild Pain (1 - 3)  oxyCODONE    5 mG/acetaminophen 325 mG 1 Tablet(s) Oral every 6 hours PRN Moderate Pain (4 - 6)  polyethylene glycol 3350 17 Gram(s) Oral daily PRN Constipation      Vital Signs Last 24 Hrs  T(C): 36.8 (31 May 2019 05:18), Max: 37.4 (30 May 2019 23:34)  T(F): 98.2 (31 May 2019 05:18), Max: 99.3 (30 May 2019 23:34)  HR: 68 (31 May 2019 05:18) (17 - 100)  BP: 123/77 (31 May 2019 05:18) (99/57 - 148/76)  BP(mean): --  RR: 18 (31 May 2019 05:18) (17 - 18)  SpO2: 98% (31 May 2019 05:18) (97% - 99%)    PHYSICAL EXAM:      EYES: EOMI, PERRLA, conjunctiva and sclera Yellow  NECK: Supple, No JVD, Normal thyroid  CHEST/LUNG: Clear to percussion bilaterally; No rales, rhonchi,   HEART: Regular rate and rhythm;   ABDOMEN:   Distended.  Ascites+++  Edema:-++        LABS:                        8.7    9.04  )-----------( 118      ( 31 May 2019 06:25 )             26.3     05-31    135  |  97  |  31<H>  ----------------------------<  153<H>  3.7   |  30  |  1.24    Ca    8.4<L>      31 May 2019 06:25    TPro  6.9  /  Alb  1.9<L>  /  TBili  7.2<H>  /  DBili  3.37<H>  /  AST  77<H>  /  ALT  32  /  AlkPhos  170<H>  05-31            RADIOLOGY & ADDITIONAL STUDIES:    PATHOLOGY:

## 2019-05-31 NOTE — PROGRESS NOTE ADULT - PROBLEM SELECTOR PLAN 2
5.7  / 73 / 24 / 144  ==> 6.0 / 80 / 27 / 137 ==> 6.2 / 76 / 24 / 151 ==> 5.7 / 75 / 26 / 194 ,  ==> 7.9 / 82 / 29 / 173 ==> 8.0 / 77 / 29 / 151 ==> 7.2 / 77 / 32 / 170 better now off Linezolid ,, elevated LFTS probably secondary to , meds ( eg  antibiotics   ) and underlying cirrhosis . Viral studies negative. CT scan / Sono [id number] essentially negative.  On lactulose. ASMA 1:80 AMA < 1:20  YARED negative  Fe/TIBC/ Ferritin 62 / 148 / 129 ( essentially negative )  Viral studies negative. Recent acute rise in bilirubin probably related to Linezolid.

## 2019-05-31 NOTE — PROGRESS NOTE ADULT - PROBLEM SELECTOR PLAN 7
resolving appreciate nephrology consult
resolved
resolved
resolving appreciate nephrology consult

## 2019-05-31 NOTE — PROGRESS NOTE ADULT - PROBLEM SELECTOR PROBLEM 7
RICHAR (acute kidney injury)

## 2019-05-31 NOTE — PROGRESS NOTE ADULT - SUBJECTIVE AND OBJECTIVE BOX
Patient is a 54y old  Male who presents with a chief complaint of anemia/ (31 May 2019 08:20)      INTERVAL HPI/OVERNIGHT EVENTS: no events     MEDICATIONS  (STANDING):  buMETAnide 1 milliGRAM(s) Oral every 12 hours  chlorhexidine 4% Liquid 1 Application(s) Topical <User Schedule>  folic acid 1 milliGRAM(s) Oral daily  lactulose Syrup 10 Gram(s) Oral two times a day  multivitamin 1 Tablet(s) Oral daily  pantoprazole    Tablet 40 milliGRAM(s) Oral before breakfast  predniSONE   Tablet 20 milliGRAM(s) Oral three times a day    MEDICATIONS  (PRN):  acetaminophen   Tablet .. 650 milliGRAM(s) Oral every 6 hours PRN Temp greater or equal to 38C (100.4F), Mild Pain (1 - 3)  oxyCODONE    5 mG/acetaminophen 325 mG 1 Tablet(s) Oral every 6 hours PRN Moderate Pain (4 - 6)  polyethylene glycol 3350 17 Gram(s) Oral daily PRN Constipation      Allergies    No Known Allergies    Intolerances           Vital Signs Last 24 Hrs  T(C): 36.8 (31 May 2019 05:18), Max: 37.4 (30 May 2019 23:34)  T(F): 98.2 (31 May 2019 05:18), Max: 99.3 (30 May 2019 23:34)  HR: 68 (31 May 2019 05:18) (68 - 100)  BP: 123/77 (31 May 2019 05:18) (123/77 - 148/76)  BP(mean): --  RR: 18 (31 May 2019 05:18) (17 - 18)  SpO2: 98% (31 May 2019 05:18) (97% - 98%)    PHYSICAL EXAM:  GENERAL: NAD, well-groomed, well-developed  HEAD:  Atraumatic, Normocephalic  EYES: EOMI, PERRLA, conjunctiva and sclera clear  ENMT: No tonsillar erythema, exudates, or enlargement; Moist mucous membranes, Good dentition, No lesions  NECK: Supple, No JVD, Normal thyroid  NERVOUS SYSTEM:  Alert & Oriented X3, Good concentration; Motor Strength 5/5 B/L upper and lower extremities; DTRs 2+ intact and symmetric  CHEST/LUNG: Clear to percussion bilaterally; No rales, rhonchi, wheezing, or rubs  HEART: Regular rate and rhythm; No murmurs, rubs, or gallops  ABDOMEN: Soft, Nontender, Nondistended; Bowel sounds present  EXTREMITIES:  2+ Peripheral Pulses, No clubbing, cyanosis, or edema  LYMPH: No lymphadenopathy noted  SKIN: No rashes or lesions    LABS:                        8.7    9.04  )-----------( 118      ( 31 May 2019 06:25 )             26.3     05-31    135  |  97  |  31<H>  ----------------------------<  153<H>  3.7   |  30  |  1.24    Ca    8.4<L>      31 May 2019 06:25    TPro  6.9  /  Alb  1.9<L>  /  TBili  7.2<H>  /  DBili  3.37<H>  /  AST  77<H>  /  ALT  32  /  AlkPhos  170<H>  05-31        CAPILLARY BLOOD GLUCOSE          RADIOLOGY & ADDITIONAL TESTS:    Imaging Personally Reviewed:  [ X] YES  [ ] NO    Consultant(s) Notes Reviewed:  [ X] YES  [ ] NO    Care Discussed with Consultants/Other Providers [X ] YES  [ ] NO

## 2019-05-31 NOTE — PROGRESS NOTE ADULT - PROBLEM SELECTOR PROBLEM 6
Metabolic encephalopathy

## 2019-05-31 NOTE — PROGRESS NOTE ADULT - PROBLEM SELECTOR PROBLEM 2
Acquired hemolytic anemia
Acquired hemolytic anemia
ETOH abuse
Acquired hemolytic anemia
Acquired hemolytic anemia
Ascites due to alcoholic cirrhosis
ETOH abuse
Elevated liver enzymes
Elevated liver enzymes
Hematoma
Acquired hemolytic anemia
Ascites due to alcoholic cirrhosis
Elevated liver enzymes
Acquired hemolytic anemia
Elevated liver enzymes
Acquired hemolytic anemia

## 2019-05-31 NOTE — PROGRESS NOTE ADULT - PROBLEM SELECTOR PROBLEM 8
ETOH abuse

## 2019-05-31 NOTE — PROGRESS NOTE ADULT - PROVIDER SPECIALTY LIST ADULT
Critical Care
Gastroenterology
Heme/Onc
Hospitalist
Infectious Disease
Infectious Disease
Nephrology
Orthopedics
Orthopedics
Hospitalist
Orthopedics
Gastroenterology
Heme/Onc
Heme/Onc
Infectious Disease
Gastroenterology
Gastroenterology
Hospitalist

## 2019-05-31 NOTE — PROGRESS NOTE ADULT - ASSESSMENT
55 yo AAM  with h/o HTN and ETOH abuse brought in by wife for generalized weakness and dark urine; pt  found to have lactic acidosis (8.8), possible RICHAR with Hb 2.9 and repeat 2.4. In the ER pt was hemodynamically stable    Patient improving. Some recovery of H/H following a total of 9 UNITS of PRBC  this admission.  Hematologic derangement's are most likely multifactorial: Acute blood loss anemia into left leg Hematoma, Active hemalosis, Poor clotting factor synthesis secondary to cirrhosis of the liver   Appreciate Hematology, Vascular, Renal, GI, ID consults as this is a multi desplinary case       CT scan reviewed no signs of internal bleeding. Continue to monitor      HGB stable       Has been waiting for [facility] transfer for several day. No bed available. At this point hgb has remained stable as well as other labs. Pt feels well and would likely benefit more from going to rehab, as at this moment he no longer acute. 53 yo AAM  with h/o HTN and ETOH abuse brought in by wife for generalized weakness and dark urine; pt  found to have lactic acidosis (8.8), possible RICHAR with Hb 2.9 and repeat 2.4. In the ER pt was hemodynamically stable    Patient improving. Some recovery of H/H following a total of 9 UNITS of PRBC  this admission.  Hematologic derangement's are most likely multifactorial: Acute blood loss anemia into left leg Hematoma, Active hemalosis, Poor clotting factor synthesis secondary to cirrhosis of the liver   Appreciate Hematology, Vascular, Renal, GI, ID consults as this is a multi desplinary case       CT scan reviewed no signs of internal bleeding. Continue to monitor      HGB stable       Has been waiting for [facility] transfer for several day. No bed available. At this point hgb has remained stable as well as other labs. Pt feels well and would likely benefit more from going to rehab, as at this moment he is no longer acute.

## 2019-05-31 NOTE — PROGRESS NOTE ADULT - PROBLEM SELECTOR PLAN 6
resolved secondary to EtOH
reolved seccondary to etoh
reolved seccondary to etoh
resolved secondary to EtOH

## 2019-05-31 NOTE — CHART NOTE - NSCHARTNOTEFT_GEN_A_CORE
Hospitalist Medicine PA    Patient is a 54y old Male admitted for anemia. Patient is requesting to leave hospital AMA. Pt explained risks of leaving AMA including worsening of symptoms and risk of death. Pt verbalized understanding.   T(C): 37.4 (05-31-19 @ 13:00), Max: 37.4 (05-30-19 @ 23:34)  HR: 89 (05-31-19 @ 13:00) (68 - 100)  BP: 136/72 (05-31-19 @ 13:00) (123/77 - 148/76)  RR: 17 (05-31-19 @ 13:00) (17 - 18)  SpO2: 98% (05-31-19 @ 13:00) (97% - 98%)  Wt(kg): --    - Dr. Garcia notified and aware  - AMA form signed and in wall chart

## 2019-05-31 NOTE — PROGRESS NOTE ADULT - PROBLEM SELECTOR PROBLEM 9
Benign essential HTN

## 2019-05-31 NOTE — CHART NOTE - NSCHARTNOTEFT_GEN_A_CORE
Assessment: Pt seen for nutrition follow up. Pt with HTN, ETOH abuse, metabolic encephalopathy, ETOH liver cirrhosis and RICHAR. Pt denied any N/V/D/C and maintains good appetite. Discussed with pt to continue good nutrition post discharge.     Factors impacting intake: [ ] none [ ] nausea  [ ] vomiting [ ] diarrhea [ ] constipation  [ ]chewing problems [ ] swallowing issues  [ ] other:     Diet Prescription: Diet, Regular:   Supplement Feeding Modality:  Oral  DanActive Cans or Servings Per Day:  1       Frequency:  Two Times a day (05-10-19 @ 07:20)    Intake: 100% PO     Current Weight: 94.4kg (05-31); 104 (05-23--> generalized  2+, Right leg 3+, Left leg/foot 4+ edema noted)   % Weight Change: unable to asses change in dry weight     Physical appearance: BMI 28.7; generalized edema 1+ noted     Pertinent Medications: MEDICATIONS  (STANDING):  buMETAnide 1 milliGRAM(s) Oral every 12 hours  chlorhexidine 4% Liquid 1 Application(s) Topical <User Schedule>  folic acid 1 milliGRAM(s) Oral daily  lactulose Syrup 10 Gram(s) Oral two times a day  multivitamin 1 Tablet(s) Oral daily  pantoprazole    Tablet 40 milliGRAM(s) Oral before breakfast  predniSONE   Tablet 20 milliGRAM(s) Oral three times a day    MEDICATIONS  (PRN):  acetaminophen   Tablet .. 650 milliGRAM(s) Oral every 6 hours PRN Temp greater or equal to 38C (100.4F), Mild Pain (1 - 3)  oxyCODONE    5 mG/acetaminophen 325 mG 1 Tablet(s) Oral every 6 hours PRN Moderate Pain (4 - 6)  polyethylene glycol 3350 17 Gram(s) Oral daily PRN Constipation    Pertinent Labs: 05-31 Na 135 mmol/L Glu 153 mg/dL<H> K+ 3.7 mmol/L Cr 1.24 mg/dL BUN 31 mg/dL<H> Phos n/a   Alb 1.9 g/dL<L> PAB n/a   Hgb 8.7 g/dL<L> Hct 26.3 %<L> HgA1C n/a    Glucose, Serum: 153 mg/dL <H>    Skin: Bruised- otherwise intact     Estimated Needs:   [x ] no change since previous assessment (05-15)  [ ] recalculated:     Previous Nutrition Diagnosis:   No Nutrition Dx    Nutrition Diagnosis is [ ] ongoing  [ ] resolved  [ ] improved  [x ] not applicable   Previous Goal: n/a    New Nutrition Diagnosis: [x ] not applicable       Interventions:   Recommend  [x ] Continue: Current diet Rx   [ ] Change Diet To:  [ ] Nutrition Supplement:  [ ] Nutrition Support:  [ ] Other:     Monitoring and Evaluation:   [ x ] PO intake [ x ] Tolerance to diet prescription [ x ] weights [ x ] labs[ x ] follow up per protocol  [ ] other:

## 2019-05-31 NOTE — PROGRESS NOTE ADULT - PROBLEM SELECTOR PROBLEM 3
Ascites due to alcoholic cirrhosis
Ascites due to alcoholic cirrhosis
ETOH abuse
Hematoma
Acquired hemolytic anemia
Ascites due to alcoholic cirrhosis
Ascites due to alcoholic cirrhosis
Cellulitis of left lower extremity
Hematoma
Ascites due to alcoholic cirrhosis
Cellulitis of left lower extremity
Hematoma

## 2019-05-31 NOTE — PROGRESS NOTE ADULT - REASON FOR ADMISSION
anemia/
anemia
anemia/
anemia
anemia/
anemia
anemia/

## 2019-06-03 DIAGNOSIS — D59.9 ACQUIRED HEMOLYTIC ANEMIA, UNSPECIFIED: ICD-10-CM

## 2019-06-03 DIAGNOSIS — D62 ACUTE POSTHEMORRHAGIC ANEMIA: ICD-10-CM

## 2019-06-03 DIAGNOSIS — G93.41 METABOLIC ENCEPHALOPATHY: ICD-10-CM

## 2019-06-03 DIAGNOSIS — E87.2 ACIDOSIS: ICD-10-CM

## 2019-06-03 DIAGNOSIS — N17.9 ACUTE KIDNEY FAILURE, UNSPECIFIED: ICD-10-CM

## 2019-06-03 DIAGNOSIS — S30.0XXA CONTUSION OF LOWER BACK AND PELVIS, INITIAL ENCOUNTER: ICD-10-CM

## 2019-06-03 DIAGNOSIS — I10 ESSENTIAL (PRIMARY) HYPERTENSION: ICD-10-CM

## 2019-06-03 DIAGNOSIS — K70.31 ALCOHOLIC CIRRHOSIS OF LIVER WITH ASCITES: ICD-10-CM

## 2019-06-03 DIAGNOSIS — R60.0 LOCALIZED EDEMA: ICD-10-CM

## 2019-06-03 DIAGNOSIS — B35.1 TINEA UNGUIUM: ICD-10-CM
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
## 2019-06-03 DIAGNOSIS — L03.116 CELLULITIS OF LEFT LOWER LIMB: ICD-10-CM

## 2019-06-03 DIAGNOSIS — R79.89 OTHER SPECIFIED ABNORMAL FINDINGS OF BLOOD CHEMISTRY: ICD-10-CM

## 2019-06-03 DIAGNOSIS — F10.10 ALCOHOL ABUSE, UNCOMPLICATED: ICD-10-CM

## 2019-06-03 DIAGNOSIS — S70.12XA CONTUSION OF LEFT THIGH, INITIAL ENCOUNTER: ICD-10-CM
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
## 2019-06-28 NOTE — PROGRESS NOTE ADULT - ASSESSMENT
HPI:  54 year old M HTN and ETOH abuse brought in by wife for generalized weakness and dark urine.  Wife reports patient has been having non-bloody non-bilious vomiting last week which stopped his drinking on Friday 5/3.  Wife also notes he has been having easy bruising and L leg swelling over the same amount of time.  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  Wife subsequently added that he has been having weakness and fatigue since early March with episodes of gum bleeding going back to early January.  Of note, wife noted that his skin color has changed over the past week.  Pt denies fevers, chills, eye pain vision changes, (08 May 2019 23:01)  -------------------- As Above ----------------------------------------------------------------------------  The patient presented with L leg pain and weakness. Patient does state that over the past few weeks he was passing dark colored urin.  IN ER, HGB was 2.4  The patient denies melena, hematochezia, hematemesis, nausea, vomiting, abdominal pain, constipation, diarrhea, or change in bowel movements No new meds. Never had a colonoscopy.     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 Will hold off on EGD / colonoscopy at present time. Patient on steroids.  Continue as per hematology  2) Elevated LFTs - bilirubin out of proportion to other results. Probably secondary to hemolysis. Patient may have underlying liver disease ( ETOH abuse ). Will need paracentesis  3) Abnormal CT scan  - stomach / colon - will need EGD / colon in near future non-affiliated

## 2023-04-20 NOTE — CONSULT NOTE ADULT - ATTENDING COMMENTS
L thigh/popliteal mass.  possibly hematoma.  recommend MRI to further eval. w/wo contrast if possible.  further recommendations based on MRI. will cont to follow patient seen and examined. agree with resident assessment and plan.  L thigh/popliteal mass.  possibly hematoma.  recommend MRI to further eval. w/wo contrast if possible.  further recommendations based on MRI. will cont to follow [facility] Counseling: I discussed with the patient the risks of valacyclovir including but not limited to kidney damage, nausea, vomiting and severe allergy.  The patient understands that if the infection seems to be worsening or is not improving, they are to call.